# Patient Record
Sex: FEMALE | Race: WHITE | NOT HISPANIC OR LATINO | URBAN - METROPOLITAN AREA
[De-identification: names, ages, dates, MRNs, and addresses within clinical notes are randomized per-mention and may not be internally consistent; named-entity substitution may affect disease eponyms.]

---

## 2020-03-18 ENCOUNTER — INPATIENT (INPATIENT)
Facility: HOSPITAL | Age: 80
LOS: 8 days | Discharge: NURSING, REHAB OR LONG-TERM CARE | DRG: 280 | End: 2020-03-27
Attending: STUDENT IN AN ORGANIZED HEALTH CARE EDUCATION/TRAINING PROGRAM | Admitting: STUDENT IN AN ORGANIZED HEALTH CARE EDUCATION/TRAINING PROGRAM
Payer: MEDICARE

## 2020-03-18 VITALS
OXYGEN SATURATION: 97 % | WEIGHT: 78.26 LBS | DIASTOLIC BLOOD PRESSURE: 96 MMHG | SYSTOLIC BLOOD PRESSURE: 162 MMHG | HEART RATE: 114 BPM | TEMPERATURE: 96 F | RESPIRATION RATE: 20 BRPM

## 2020-03-18 DIAGNOSIS — R65.10 SYSTEMIC INFLAMMATORY RESPONSE SYNDROME (SIRS) OF NON-INFECTIOUS ORIGIN WITHOUT ACUTE ORGAN DYSFUNCTION: ICD-10-CM

## 2020-03-18 DIAGNOSIS — Z29.9 ENCOUNTER FOR PROPHYLACTIC MEASURES, UNSPECIFIED: ICD-10-CM

## 2020-03-18 DIAGNOSIS — I21.3 ST ELEVATION (STEMI) MYOCARDIAL INFARCTION OF UNSPECIFIED SITE: ICD-10-CM

## 2020-03-18 DIAGNOSIS — E87.0 HYPEROSMOLALITY AND HYPERNATREMIA: ICD-10-CM

## 2020-03-18 DIAGNOSIS — Z91.89 OTHER SPECIFIED PERSONAL RISK FACTORS, NOT ELSEWHERE CLASSIFIED: ICD-10-CM

## 2020-03-18 DIAGNOSIS — E87.2 ACIDOSIS: ICD-10-CM

## 2020-03-18 DIAGNOSIS — G93.40 ENCEPHALOPATHY, UNSPECIFIED: ICD-10-CM

## 2020-03-18 LAB
ALBUMIN SERPL ELPH-MCNC: 3.3 G/DL — SIGNIFICANT CHANGE UP (ref 3.3–5)
ALP SERPL-CCNC: 48 U/L — SIGNIFICANT CHANGE UP (ref 40–120)
ALT FLD-CCNC: 22 U/L — SIGNIFICANT CHANGE UP (ref 10–45)
ANION GAP SERPL CALC-SCNC: 19 MMOL/L — HIGH (ref 5–17)
ANION GAP SERPL CALC-SCNC: 24 MMOL/L — HIGH (ref 5–17)
APTT BLD: 29.9 SEC — SIGNIFICANT CHANGE UP (ref 27.5–36.3)
APTT BLD: 49.5 SEC — HIGH (ref 27.5–36.3)
AST SERPL-CCNC: 65 U/L — HIGH (ref 10–40)
B-OH-BUTYR SERPL-SCNC: 5 MMOL/L — HIGH
B-OH-BUTYR SERPL-SCNC: 5.1 MMOL/L — HIGH
BASE EXCESS BLDA CALC-SCNC: -4 MMOL/L — LOW (ref -2–3)
BASOPHILS # BLD AUTO: 0.02 K/UL — SIGNIFICANT CHANGE UP (ref 0–0.2)
BASOPHILS NFR BLD AUTO: 0.1 % — SIGNIFICANT CHANGE UP (ref 0–2)
BILIRUB SERPL-MCNC: 0.6 MG/DL — SIGNIFICANT CHANGE UP (ref 0.2–1.2)
BLD GP AB SCN SERPL QL: NEGATIVE — SIGNIFICANT CHANGE UP
BLD GP AB SCN SERPL QL: NEGATIVE — SIGNIFICANT CHANGE UP
BUN SERPL-MCNC: 59 MG/DL — HIGH (ref 7–23)
BUN SERPL-MCNC: 62 MG/DL — HIGH (ref 7–23)
CALCIUM SERPL-MCNC: 10.1 MG/DL — SIGNIFICANT CHANGE UP (ref 8.4–10.5)
CALCIUM SERPL-MCNC: 10.7 MG/DL — HIGH (ref 8.4–10.5)
CHLORIDE SERPL-SCNC: 107 MMOL/L — SIGNIFICANT CHANGE UP (ref 96–108)
CHLORIDE SERPL-SCNC: 110 MMOL/L — HIGH (ref 96–108)
CHOLEST SERPL-MCNC: 129 MG/DL — SIGNIFICANT CHANGE UP (ref 10–199)
CK MB CFR SERPL CALC: 47 NG/ML — HIGH (ref 0–6.7)
CK SERPL-CCNC: 193 U/L — HIGH (ref 25–170)
CO2 SERPL-SCNC: 18 MMOL/L — LOW (ref 22–31)
CO2 SERPL-SCNC: 19 MMOL/L — LOW (ref 22–31)
CREAT SERPL-MCNC: 0.79 MG/DL — SIGNIFICANT CHANGE UP (ref 0.5–1.3)
CREAT SERPL-MCNC: 0.82 MG/DL — SIGNIFICANT CHANGE UP (ref 0.5–1.3)
EOSINOPHIL # BLD AUTO: 0 K/UL — SIGNIFICANT CHANGE UP (ref 0–0.5)
EOSINOPHIL NFR BLD AUTO: 0 % — SIGNIFICANT CHANGE UP (ref 0–6)
ERYTHROCYTE [SEDIMENTATION RATE] IN BLOOD: 1 MM/HR — SIGNIFICANT CHANGE UP
FERRITIN SERPL-MCNC: 518 NG/ML — HIGH (ref 15–150)
GLUCOSE BLDC GLUCOMTR-MCNC: 127 MG/DL — HIGH (ref 70–99)
GLUCOSE SERPL-MCNC: 160 MG/DL — HIGH (ref 70–99)
GLUCOSE SERPL-MCNC: 193 MG/DL — HIGH (ref 70–99)
HBA1C BLD-MCNC: 5.5 % — SIGNIFICANT CHANGE UP (ref 4–5.6)
HCO3 BLDA-SCNC: 20 MMOL/L — LOW (ref 21–28)
HCT VFR BLD CALC: 46.2 % — HIGH (ref 34.5–45)
HDLC SERPL-MCNC: 59 MG/DL — SIGNIFICANT CHANGE UP
HGB BLD-MCNC: 15.9 G/DL — HIGH (ref 11.5–15.5)
IMM GRANULOCYTES NFR BLD AUTO: 0.7 % — SIGNIFICANT CHANGE UP (ref 0–1.5)
INR BLD: 1.47 — HIGH (ref 0.88–1.16)
INR BLD: 1.6 — HIGH (ref 0.88–1.16)
LACTATE SERPL-SCNC: 2.7 MMOL/L — HIGH (ref 0.5–2)
LDH SERPL L TO P-CCNC: 383 U/L — HIGH (ref 50–242)
LIPID PNL WITH DIRECT LDL SERPL: 46 MG/DL — SIGNIFICANT CHANGE UP
LYMPHOCYTES # BLD AUTO: 0.78 K/UL — LOW (ref 1–3.3)
LYMPHOCYTES # BLD AUTO: 5.3 % — LOW (ref 13–44)
MAGNESIUM SERPL-MCNC: 2.2 MG/DL — SIGNIFICANT CHANGE UP (ref 1.6–2.6)
MCHC RBC-ENTMCNC: 29.9 PG — SIGNIFICANT CHANGE UP (ref 27–34)
MCHC RBC-ENTMCNC: 34.4 GM/DL — SIGNIFICANT CHANGE UP (ref 32–36)
MCV RBC AUTO: 86.8 FL — SIGNIFICANT CHANGE UP (ref 80–100)
MONOCYTES # BLD AUTO: 1.15 K/UL — HIGH (ref 0–0.9)
MONOCYTES NFR BLD AUTO: 7.8 % — SIGNIFICANT CHANGE UP (ref 2–14)
NEUTROPHILS # BLD AUTO: 12.71 K/UL — HIGH (ref 1.8–7.4)
NEUTROPHILS NFR BLD AUTO: 86.1 % — HIGH (ref 43–77)
NRBC # BLD: 0 /100 WBCS — SIGNIFICANT CHANGE UP (ref 0–0)
OSMOLALITY SERPL: 336 MOSMOL/KG — HIGH (ref 280–301)
PCO2 BLDA: 34 MMHG — SIGNIFICANT CHANGE UP (ref 32–45)
PH BLDA: 7.39 — SIGNIFICANT CHANGE UP (ref 7.35–7.45)
PHOSPHATE SERPL-MCNC: 3.1 MG/DL — SIGNIFICANT CHANGE UP (ref 2.5–4.5)
PLATELET # BLD AUTO: 201 K/UL — SIGNIFICANT CHANGE UP (ref 150–400)
PO2 BLDA: 93 MMHG — SIGNIFICANT CHANGE UP (ref 83–108)
POTASSIUM SERPL-MCNC: 3.1 MMOL/L — LOW (ref 3.5–5.3)
POTASSIUM SERPL-MCNC: 3.6 MMOL/L — SIGNIFICANT CHANGE UP (ref 3.5–5.3)
POTASSIUM SERPL-SCNC: 3.1 MMOL/L — LOW (ref 3.5–5.3)
POTASSIUM SERPL-SCNC: 3.6 MMOL/L — SIGNIFICANT CHANGE UP (ref 3.5–5.3)
PROT SERPL-MCNC: 6.3 G/DL — SIGNIFICANT CHANGE UP (ref 6–8.3)
PROTHROM AB SERPL-ACNC: 16.9 SEC — HIGH (ref 10–12.9)
PROTHROM AB SERPL-ACNC: 18.5 SEC — HIGH (ref 10–12.9)
RAPID RVP RESULT: SIGNIFICANT CHANGE UP
RBC # BLD: 5.32 M/UL — HIGH (ref 3.8–5.2)
RBC # FLD: 14.9 % — HIGH (ref 10.3–14.5)
RH IG SCN BLD-IMP: NEGATIVE — SIGNIFICANT CHANGE UP
RH IG SCN BLD-IMP: NEGATIVE — SIGNIFICANT CHANGE UP
SAO2 % BLDA: 97 % — SIGNIFICANT CHANGE UP (ref 95–100)
SODIUM SERPL-SCNC: 148 MMOL/L — HIGH (ref 135–145)
SODIUM SERPL-SCNC: 149 MMOL/L — HIGH (ref 135–145)
T4 AB SER-ACNC: 8.46 UG/DL — SIGNIFICANT CHANGE UP (ref 3.17–11.72)
TOTAL CHOLESTEROL/HDL RATIO MEASUREMENT: 2.2 RATIO — LOW (ref 3.3–7.1)
TRIGL SERPL-MCNC: 122 MG/DL — SIGNIFICANT CHANGE UP (ref 10–149)
TROPONIN T SERPL-MCNC: 1.1 NG/ML — CRITICAL HIGH (ref 0–0.01)
TSH SERPL-MCNC: 0.13 UIU/ML — LOW (ref 0.35–4.94)
WBC # BLD: 14.76 K/UL — HIGH (ref 3.8–10.5)
WBC # FLD AUTO: 14.76 K/UL — HIGH (ref 3.8–10.5)

## 2020-03-18 PROCEDURE — 93460 R&L HRT ART/VENTRICLE ANGIO: CPT | Mod: 26

## 2020-03-18 PROCEDURE — 71250 CT THORAX DX C-: CPT | Mod: 26

## 2020-03-18 PROCEDURE — 71045 X-RAY EXAM CHEST 1 VIEW: CPT | Mod: 26

## 2020-03-18 PROCEDURE — 70450 CT HEAD/BRAIN W/O DYE: CPT | Mod: 26

## 2020-03-18 RX ORDER — ASPIRIN/CALCIUM CARB/MAGNESIUM 324 MG
300 TABLET ORAL ONCE
Refills: 0 | Status: COMPLETED | OUTPATIENT
Start: 2020-03-18 | End: 2020-03-18

## 2020-03-18 RX ORDER — POTASSIUM CHLORIDE 20 MEQ
20 PACKET (EA) ORAL
Refills: 0 | Status: COMPLETED | OUTPATIENT
Start: 2020-03-18 | End: 2020-03-18

## 2020-03-18 RX ORDER — SODIUM CHLORIDE 9 MG/ML
250 INJECTION INTRAMUSCULAR; INTRAVENOUS; SUBCUTANEOUS ONCE
Refills: 0 | Status: COMPLETED | OUTPATIENT
Start: 2020-03-18 | End: 2020-03-18

## 2020-03-18 RX ORDER — POTASSIUM CHLORIDE 20 MEQ
10 PACKET (EA) ORAL
Refills: 0 | Status: DISCONTINUED | OUTPATIENT
Start: 2020-03-18 | End: 2020-03-18

## 2020-03-18 RX ADMIN — Medication 100 MILLIEQUIVALENT(S): at 23:52

## 2020-03-18 RX ADMIN — Medication 300 MILLIGRAM(S): at 22:09

## 2020-03-18 RX ADMIN — Medication 100 MILLIEQUIVALENT(S): at 21:09

## 2020-03-18 RX ADMIN — SODIUM CHLORIDE 41.67 MILLILITER(S): 9 INJECTION INTRAMUSCULAR; INTRAVENOUS; SUBCUTANEOUS at 22:21

## 2020-03-18 RX ADMIN — SODIUM CHLORIDE 500 MILLILITER(S): 9 INJECTION INTRAMUSCULAR; INTRAVENOUS; SUBCUTANEOUS at 20:54

## 2020-03-18 RX ADMIN — Medication 100 MILLIEQUIVALENT(S): at 22:40

## 2020-03-18 NOTE — H&P ADULT - ASSESSMENT
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

## 2020-03-18 NOTE — H&P ADULT - NSHPPHYSICALEXAM_GEN_ALL_CORE
T(F): 97.3 (03-18-20 @ 19:00)  HR: 88 (03-18-20 @ 19:00)  BP: 91/57 (03-18-20 @ 19:00)  RR: 15 (03-18-20 @ 19:00)  SpO2: 98% (03-18-20 @ 19:00)    GENERAL: breathing on non-rebreather comfortably, elderly, cachectic, follows commands, only repeats her last name  HEAD: NC/AT  EYES: EOMI, no scleral icterus  HEENT: dry mucous membranes  NECK: Supple   LUNG: CTAB b/l, no wheezing or crackles  HEART: tachycardic, +s1 +s2  ABDOMEN: normal BS, no pain w/ deep palpation in all quadrants  EXTREMITIES: No leg edema b/l, right hand cyanotic w/ radial band  VASCULAR: 2+ radial pulse, multiple ulcers on toes/feet/back  CNS: Pt not responding to questions

## 2020-03-18 NOTE — H&P ADULT - PROBLEM SELECTOR PLAN 6
F: None (EF=15-20%)  E: Replete K and mag  N: NPO  A: lovenox F: None (EF=15-20%)  E: Replete K and mag  N: NPO  A: lovenox 40mg

## 2020-03-18 NOTE — H&P ADULT - HISTORY OF PRESENT ILLNESS
HPI: 78 yo F unknown PMH brought in by EMS w/ STEMI. Pt taken straight to cath lab on arrival. Pt was not able to provide hx, and no other hx provided. Pt was put on non-rebreather mask by EMS and taken streight to cath lab. There was no contacts listed or documented home address. Home address was written as hospital address by EMS. No home or family numbers listed.  Vitals: 96.4 F, , 162/96--->90/54, RR 15-20, spo2=97-98 on non-rebreather (15L/min)  Labs drawn during Cath: WBC 14.7, lymphocytes 0.78 (low), Hgb 15.9, INR=1.47, Na 149, K=3.1, HCO3-18, AG=24, BUN=62, Ca 10.7, AST=65, lactate dehydrogenase 383, troponin 1.10, creatine kinase 193, ABG PH=7.39 PO2=93 PCO2=34   Imaging: CXR neg for infiltrate HPI: 78 yo F unknown PMH brought in by EMS w/ STEMI. Pt taken straight to cath lab on arrival. Pt was not able to provide hx, and no other hx provided. Pt was put on non-rebreather mask by EMS and taken streight to cath lab. There was no contacts listed or documented home address. Home address was written as hospital address by EMS. No home or family numbers listed.  Vitals: 96.4 F, , 162/96--->90/54, RR 15-20, spo2=97-98 on non-rebreather (15L/min)  Labs drawn during Cath: WBC 14.7, lymphocytes 0.78 (low), Hgb 15.9, INR=1.47, Na 149, K=3.1, HCO3-18, AG=24, BUN=62, Ca 10.7, AST=65, lactate dehydrogenase 383, troponin 1.10, creatine kinase 193, ABG PH=7.39 PO2=93 PCO2=34   Imaging: CXR neg for infiltrate, however hyperinflated HPI: 78 yo F unknown PMH brought in by EMS w/ STEMI. Pt taken straight to cath lab on arrival. Pt was not able to provide hx, and no other hx provided. Pt was put on non-rebreather mask by EMS and taken straight to cath lab. There were no contacts listed or documented home address. Home address was written as hospital address by EMS. No home or family numbers listed.  Vitals: 96.4 F, , 162/96--->90/54, RR 15-20, spo2=97-98 on non-rebreather (15L/min)  Labs drawn during Cath: WBC 14.7, lymphocytes 0.78 (low), Hgb 15.9, INR=1.47, Na 149, K=3.1, HCO3-18, AG=24, BUN=62, Ca 10.7, AST=65, lactate dehydrogenase 383, troponin 1.10, creatine kinase 193, ABG PH=7.39 PO2=93 PCO2=34   Imaging: CXR neg for infiltrate, however hyperinflated

## 2020-03-18 NOTE — H&P ADULT - PROBLEM SELECTOR PLAN 3
Pt presenting w/ no provided hx by EMS, and not answering questions on exam. Does follow commands. May be 2/2 SIRS/possible sepsis vs STEMI/Takutsobo Cardiomyopathy vs underlying dementia vs AGMA 2/2 uremia/elevated lactic acid  -need collateral on living situation/family  -f/u U/A, RVP, bcx, lactate, u/a, utox, rvp Pt presenting w/ no provided hx by EMS, and not answering questions on exam. Does follow commands. May be 2/2 SIRS/possible sepsis vs STEMI/Takotsubo Cardiomyopathy vs underlying dementia vs AGMA 2/2 uremia/elevated lactic acid  -need collateral on living situation/family  -f/u U/A, RVP, bcx, lactate, u/a, utox, rvp

## 2020-03-18 NOTE — H&P ADULT - PROBLEM SELECTOR PLAN 5
Pd=447, may be 2/2 dehydration  -will consider fluids, as dry on exam  -trend Na Rc=215, may be 2/2 dehydration  -will give 250cc fluids, as dry on exam  -trend Na

## 2020-03-18 NOTE — H&P ADULT - PROBLEM SELECTOR PLAN 1
STEMI 2/2 Takusubo: Pt brought in by EMS, ST elevation on EKG. STEMI protocol initiated. s/p cardiac cath showing LV EF=15-20%, severe apical ballooning, akinesis suggestive of Takutsobo Cardiomyopathy. No stents placed. LVEDP=17mmhg+ normal EDP pre and post angio, anterior and apical akinesis. LAD=70% diffuse stenosis, Lcx=30-40% stenosis, RCA=large One vessel coronary artery disease, Frailty score=7, RCA=mild luminal irregularities. [place] catheter placed during cath. Pt unable to provide any history. Takutsobo most likely 2/2 sepsis vs shock vs stress/emotional vs drug induced vs 2/2 underlying HF  -c/w radial artery check w/ radial band. Pt hand is cyanotic, however was noted to be cyanotic per EMS before procedure  -f/u repeat coags  -f/u ekg  -will aspirin load, and start high dose statin  -f/u repeat echo STEMI 2/2 Takusubo: Pt brought in by EMS, ST elevation on EKG in lateral leads. STEMI protocol initiated. s/p cardiac cath showing LV EF=15-20%, severe apical ballooning, akinesis suggestive of Takutsobo Cardiomyopathy. No stents placed. LVEDP=17mmhg+ normal EDP pre and post angio, anterior and apical akinesis. LAD=70% diffuse stenosis, Lcx=30-40% stenosis, RCA=large One vessel coronary artery disease, Frailty score=7, RCA=mild luminal irregularities. [place] catheter placed during cath. Pt unable to provide any history. Takutsobo most likely 2/2 sepsis vs shock vs stress/emotional vs drug induced vs 2/2 underlying HF  -c/w radial artery check w/ radial band. Pt hand is cyanotic, however was noted to be cyanotic per EMS before procedure  -f/u repeat coags  -f/u ekg  -will aspirin load, and start high dose statin  -f/u repeat echo

## 2020-03-18 NOTE — H&P ADULT - PROBLEM SELECTOR PLAN 4
Pt w/ HCO3=18, AG=24, most likely 2/2 poor perfusion 2/2 STEMI, w/ elevated lactic acid and uremia  -trend lactate to clearance  -trend BUN  -sent lactate  -pt dry on exam, will consider fluids  -f/u U/A Pt w/ HCO3=18, AG=24, most likely 2/2 poor perfusion 2/2 STEMI, w/ elevated lactic acid and uremia  -trend lactate to clearance  -trend BUN  -sent lactate  -pt dry on exam, will consider fluids  -f/u U/A  -serum osmolal gap of 7

## 2020-03-18 NOTE — H&P ADULT - NSHPLABSRESULTS_GEN_ALL_CORE
.  LABS:                         15.9   14.76 )-----------( 201      ( 18 Mar 2020 17:18 )             46.2     03-18    149<H>  |  107  |  62<H>  ----------------------------<  193<H>  3.1<L>   |  18<L>  |  0.82    Ca    10.7<H>      18 Mar 2020 17:18  Mg     2.2     03-18    TPro  6.3  /  Alb  3.3  /  TBili  0.6  /  DBili  x   /  AST  65<H>  /  ALT  22  /  AlkPhos  48  03-18    PT/INR - ( 18 Mar 2020 17:18 )   PT: 16.9 sec;   INR: 1.47          PTT - ( 18 Mar 2020 17:18 )  PTT:29.9 sec    CARDIAC MARKERS ( 18 Mar 2020 17:18 )  x     / 1.10 ng/mL / 193 U/L / x     / 47.0 ng/mL        Lactate, Blood: 2.7 mmol/L (03-18 @ 19:03)      RADIOLOGY, EKG & ADDITIONAL TESTS: Reviewed. LABS:                         15.9   14.76 )-----------( 201      ( 18 Mar 2020 17:18 )             46.2     03-18    149<H>  |  107  |  62<H>  ----------------------------<  193<H>  3.1<L>   |  18<L>  |  0.82    Ca    10.7<H>      18 Mar 2020 17:18  Mg     2.2     03-18    TPro  6.3  /  Alb  3.3  /  TBili  0.6  /  DBili  x   /  AST  65<H>  /  ALT  22  /  AlkPhos  48  03-18    PT/INR - ( 18 Mar 2020 17:18 )   PT: 16.9 sec;   INR: 1.47          PTT - ( 18 Mar 2020 17:18 )  PTT:29.9 sec    CARDIAC MARKERS ( 18 Mar 2020 17:18 )  x     / 1.10 ng/mL / 193 U/L / x     / 47.0 ng/mL        Lactate, Blood: 2.7 mmol/L (03-18 @ 19:03)      RADIOLOGY, EKG & ADDITIONAL TESTS: Reviewed.

## 2020-03-18 NOTE — PATIENT PROFILE ADULT - FALL HARM RISK
coagulation(Bleeding disorder R/T clinical cond/anti-coags)/other/UTD; pt barely speaking/responsive

## 2020-03-18 NOTE — H&P ADULT - PROBLEM SELECTOR PLAN 2
tachycardic , leukocytosis wbc 14, lymphopenia, hypothermic 96.7F, may be 2/2 infection vs reactive to cath  -sent RVP  -f/u U/A, RVP, bcx, lactate, u/a, utox, rvp  -hold off on fluids as pt EF 15-20%

## 2020-03-19 DIAGNOSIS — R62.7 ADULT FAILURE TO THRIVE: ICD-10-CM

## 2020-03-19 DIAGNOSIS — I42.9 CARDIOMYOPATHY, UNSPECIFIED: ICD-10-CM

## 2020-03-19 DIAGNOSIS — J96.91 RESPIRATORY FAILURE, UNSPECIFIED WITH HYPOXIA: ICD-10-CM

## 2020-03-19 DIAGNOSIS — E07.9 DISORDER OF THYROID, UNSPECIFIED: ICD-10-CM

## 2020-03-19 LAB
ALBUMIN SERPL ELPH-MCNC: 2.4 G/DL — LOW (ref 3.3–5)
ALBUMIN SERPL ELPH-MCNC: 3.1 G/DL — LOW (ref 3.3–5)
ALP SERPL-CCNC: 35 U/L — LOW (ref 40–120)
ALP SERPL-CCNC: 44 U/L — SIGNIFICANT CHANGE UP (ref 40–120)
ALT FLD-CCNC: 16 U/L — SIGNIFICANT CHANGE UP (ref 10–45)
ALT FLD-CCNC: 20 U/L — SIGNIFICANT CHANGE UP (ref 10–45)
ANION GAP SERPL CALC-SCNC: 10 MMOL/L — SIGNIFICANT CHANGE UP (ref 5–17)
ANION GAP SERPL CALC-SCNC: 13 MMOL/L — SIGNIFICANT CHANGE UP (ref 5–17)
ANION GAP SERPL CALC-SCNC: 13 MMOL/L — SIGNIFICANT CHANGE UP (ref 5–17)
ANION GAP SERPL CALC-SCNC: 18 MMOL/L — HIGH (ref 5–17)
APPEARANCE UR: CLEAR — SIGNIFICANT CHANGE UP
APTT BLD: 27.6 SEC — SIGNIFICANT CHANGE UP (ref 27.5–36.3)
AST SERPL-CCNC: 41 U/L — HIGH (ref 10–40)
AST SERPL-CCNC: 55 U/L — HIGH (ref 10–40)
BACTERIA # UR AUTO: PRESENT /HPF
BASE EXCESS BLDA CALC-SCNC: -4.4 MMOL/L — LOW (ref -2–3)
BASOPHILS # BLD AUTO: 0.03 K/UL — SIGNIFICANT CHANGE UP (ref 0–0.2)
BASOPHILS NFR BLD AUTO: 0.2 % — SIGNIFICANT CHANGE UP (ref 0–2)
BILIRUB SERPL-MCNC: 0.4 MG/DL — SIGNIFICANT CHANGE UP (ref 0.2–1.2)
BILIRUB SERPL-MCNC: 0.5 MG/DL — SIGNIFICANT CHANGE UP (ref 0.2–1.2)
BILIRUB UR-MCNC: ABNORMAL
BUN SERPL-MCNC: 50 MG/DL — HIGH (ref 7–23)
BUN SERPL-MCNC: 52 MG/DL — HIGH (ref 7–23)
BUN SERPL-MCNC: 54 MG/DL — HIGH (ref 7–23)
BUN SERPL-MCNC: 59 MG/DL — HIGH (ref 7–23)
CALCIUM SERPL-MCNC: 10.1 MG/DL — SIGNIFICANT CHANGE UP (ref 8.4–10.5)
CALCIUM SERPL-MCNC: 8.7 MG/DL — SIGNIFICANT CHANGE UP (ref 8.4–10.5)
CALCIUM SERPL-MCNC: 9.4 MG/DL — SIGNIFICANT CHANGE UP (ref 8.4–10.5)
CALCIUM SERPL-MCNC: 9.8 MG/DL — SIGNIFICANT CHANGE UP (ref 8.4–10.5)
CHLORIDE SERPL-SCNC: 112 MMOL/L — HIGH (ref 96–108)
CHLORIDE SERPL-SCNC: 113 MMOL/L — HIGH (ref 96–108)
CHLORIDE SERPL-SCNC: 113 MMOL/L — HIGH (ref 96–108)
CHLORIDE SERPL-SCNC: 118 MMOL/L — HIGH (ref 96–108)
CK MB CFR SERPL CALC: 31.1 NG/ML — HIGH (ref 0–6.7)
CK SERPL-CCNC: 150 U/L — SIGNIFICANT CHANGE UP (ref 25–170)
CO2 SERPL-SCNC: 19 MMOL/L — LOW (ref 22–31)
CO2 SERPL-SCNC: 19 MMOL/L — LOW (ref 22–31)
CO2 SERPL-SCNC: 22 MMOL/L — SIGNIFICANT CHANGE UP (ref 22–31)
CO2 SERPL-SCNC: 23 MMOL/L — SIGNIFICANT CHANGE UP (ref 22–31)
COLOR SPEC: YELLOW — SIGNIFICANT CHANGE UP
CORTIS AM PEAK SERPL-MCNC: 93.9 UG/DL — SIGNIFICANT CHANGE UP (ref 3.9–37.5)
CREAT SERPL-MCNC: 0.63 MG/DL — SIGNIFICANT CHANGE UP (ref 0.5–1.3)
CREAT SERPL-MCNC: 0.63 MG/DL — SIGNIFICANT CHANGE UP (ref 0.5–1.3)
CREAT SERPL-MCNC: 0.68 MG/DL — SIGNIFICANT CHANGE UP (ref 0.5–1.3)
CREAT SERPL-MCNC: 0.81 MG/DL — SIGNIFICANT CHANGE UP (ref 0.5–1.3)
CRP SERPL-MCNC: 2.09 MG/DL — HIGH (ref 0–0.4)
DIFF PNL FLD: ABNORMAL
EOSINOPHIL # BLD AUTO: 0 K/UL — SIGNIFICANT CHANGE UP (ref 0–0.5)
EOSINOPHIL NFR BLD AUTO: 0 % — SIGNIFICANT CHANGE UP (ref 0–6)
EPI CELLS # UR: SIGNIFICANT CHANGE UP /HPF (ref 0–5)
ETHANOL SERPL-MCNC: <10 MG/DL — SIGNIFICANT CHANGE UP (ref 0–10)
FOLATE SERPL-MCNC: 18.6 NG/ML — SIGNIFICANT CHANGE UP
GAS PNL BLDA: SIGNIFICANT CHANGE UP
GLUCOSE BLDC GLUCOMTR-MCNC: 166 MG/DL — HIGH (ref 70–99)
GLUCOSE BLDC GLUCOMTR-MCNC: 170 MG/DL — HIGH (ref 70–99)
GLUCOSE BLDC GLUCOMTR-MCNC: 184 MG/DL — HIGH (ref 70–99)
GLUCOSE SERPL-MCNC: 139 MG/DL — HIGH (ref 70–99)
GLUCOSE SERPL-MCNC: 163 MG/DL — HIGH (ref 70–99)
GLUCOSE SERPL-MCNC: 226 MG/DL — HIGH (ref 70–99)
GLUCOSE SERPL-MCNC: 235 MG/DL — HIGH (ref 70–99)
GLUCOSE UR QL: NEGATIVE — SIGNIFICANT CHANGE UP
GRAN CASTS # UR COMP ASSIST: ABNORMAL /LPF
HCO3 BLDA-SCNC: 18 MMOL/L — LOW (ref 21–28)
HCT VFR BLD CALC: 43.9 % — SIGNIFICANT CHANGE UP (ref 34.5–45)
HGB BLD-MCNC: 14.9 G/DL — SIGNIFICANT CHANGE UP (ref 11.5–15.5)
HYALINE CASTS # UR AUTO: ABNORMAL /LPF (ref 0–2)
IMM GRANULOCYTES NFR BLD AUTO: 0.9 % — SIGNIFICANT CHANGE UP (ref 0–1.5)
INR BLD: 1.56 — HIGH (ref 0.88–1.16)
KETONES UR-MCNC: 15 MG/DL
LACTATE SERPL-SCNC: 1.7 MMOL/L — SIGNIFICANT CHANGE UP (ref 0.5–2)
LEUKOCYTE ESTERASE UR-ACNC: NEGATIVE — SIGNIFICANT CHANGE UP
LYMPHOCYTES # BLD AUTO: 0.75 K/UL — LOW (ref 1–3.3)
LYMPHOCYTES # BLD AUTO: 4.3 % — LOW (ref 13–44)
MAGNESIUM SERPL-MCNC: 1.8 MG/DL — SIGNIFICANT CHANGE UP (ref 1.6–2.6)
MAGNESIUM SERPL-MCNC: 2.1 MG/DL — SIGNIFICANT CHANGE UP (ref 1.6–2.6)
MAGNESIUM SERPL-MCNC: 2.5 MG/DL — SIGNIFICANT CHANGE UP (ref 1.6–2.6)
MAGNESIUM SERPL-MCNC: 2.7 MG/DL — HIGH (ref 1.6–2.6)
MCHC RBC-ENTMCNC: 30 PG — SIGNIFICANT CHANGE UP (ref 27–34)
MCHC RBC-ENTMCNC: 33.9 GM/DL — SIGNIFICANT CHANGE UP (ref 32–36)
MCV RBC AUTO: 88.5 FL — SIGNIFICANT CHANGE UP (ref 80–100)
MONOCYTES # BLD AUTO: 1.45 K/UL — HIGH (ref 0–0.9)
MONOCYTES NFR BLD AUTO: 8.3 % — SIGNIFICANT CHANGE UP (ref 2–14)
NEUTROPHILS # BLD AUTO: 15.08 K/UL — HIGH (ref 1.8–7.4)
NEUTROPHILS NFR BLD AUTO: 86.3 % — HIGH (ref 43–77)
NITRITE UR-MCNC: NEGATIVE — SIGNIFICANT CHANGE UP
NRBC # BLD: 0 /100 WBCS — SIGNIFICANT CHANGE UP (ref 0–0)
OSMOLALITY SERPL: 336 MOSMOL/KG — HIGH (ref 280–301)
OSMOLALITY SERPL: 341 MOSMOL/KG — HIGH (ref 280–301)
OSMOLALITY UR: 739 MOSMOL/KG — HIGH (ref 100–650)
OSMOLALITY UR: 745 MOSMOL/KG — HIGH (ref 100–650)
OSMOLALITY UR: 847 MOSMOL/KG — HIGH (ref 100–650)
PCO2 BLDA: 29 MMHG — LOW (ref 32–45)
PCP SPEC-MCNC: SIGNIFICANT CHANGE UP
PH BLDA: 7.42 — SIGNIFICANT CHANGE UP (ref 7.35–7.45)
PH UR: 6.5 — SIGNIFICANT CHANGE UP (ref 5–8)
PHOSPHATE SERPL-MCNC: 2.1 MG/DL — LOW (ref 2.5–4.5)
PHOSPHATE SERPL-MCNC: 2.4 MG/DL — LOW (ref 2.5–4.5)
PHOSPHATE SERPL-MCNC: 2.5 MG/DL — SIGNIFICANT CHANGE UP (ref 2.5–4.5)
PHOSPHATE SERPL-MCNC: 4.2 MG/DL — SIGNIFICANT CHANGE UP (ref 2.5–4.5)
PLATELET # BLD AUTO: 175 K/UL — SIGNIFICANT CHANGE UP (ref 150–400)
PO2 BLDA: 75 MMHG — LOW (ref 83–108)
POTASSIUM SERPL-MCNC: 4.5 MMOL/L — SIGNIFICANT CHANGE UP (ref 3.5–5.3)
POTASSIUM SERPL-MCNC: 4.6 MMOL/L — SIGNIFICANT CHANGE UP (ref 3.5–5.3)
POTASSIUM SERPL-MCNC: 5.3 MMOL/L — SIGNIFICANT CHANGE UP (ref 3.5–5.3)
POTASSIUM SERPL-MCNC: 5.6 MMOL/L — HIGH (ref 3.5–5.3)
POTASSIUM SERPL-SCNC: 4.5 MMOL/L — SIGNIFICANT CHANGE UP (ref 3.5–5.3)
POTASSIUM SERPL-SCNC: 4.6 MMOL/L — SIGNIFICANT CHANGE UP (ref 3.5–5.3)
POTASSIUM SERPL-SCNC: 5.3 MMOL/L — SIGNIFICANT CHANGE UP (ref 3.5–5.3)
POTASSIUM SERPL-SCNC: 5.6 MMOL/L — HIGH (ref 3.5–5.3)
PROT SERPL-MCNC: 5 G/DL — LOW (ref 6–8.3)
PROT SERPL-MCNC: 5.8 G/DL — LOW (ref 6–8.3)
PROT UR-MCNC: ABNORMAL MG/DL
PROTHROM AB SERPL-ACNC: 18 SEC — HIGH (ref 10–12.9)
RBC # BLD: 4.96 M/UL — SIGNIFICANT CHANGE UP (ref 3.8–5.2)
RBC # FLD: 15.3 % — HIGH (ref 10.3–14.5)
RBC CASTS # UR COMP ASSIST: < 5 /HPF — SIGNIFICANT CHANGE UP
SALICYLATES SERPL-MCNC: 1.4 MG/DL — LOW (ref 2.8–20)
SAO2 % BLDA: 95 % — SIGNIFICANT CHANGE UP (ref 95–100)
SODIUM SERPL-SCNC: 145 MMOL/L — SIGNIFICANT CHANGE UP (ref 135–145)
SODIUM SERPL-SCNC: 148 MMOL/L — HIGH (ref 135–145)
SODIUM SERPL-SCNC: 150 MMOL/L — HIGH (ref 135–145)
SODIUM SERPL-SCNC: 150 MMOL/L — HIGH (ref 135–145)
SODIUM UR-SCNC: 20 MMOL/L — SIGNIFICANT CHANGE UP
SP GR SPEC: <=1.005 — SIGNIFICANT CHANGE UP (ref 1–1.03)
T3 SERPL-MCNC: 56 NG/DL — LOW (ref 80–200)
T4 FREE SERPL-MCNC: 1.13 NG/DL — SIGNIFICANT CHANGE UP (ref 0.7–1.48)
TROPONIN T SERPL-MCNC: 0.94 NG/ML — CRITICAL HIGH (ref 0–0.01)
UROBILINOGEN FLD QL: 1 E.U./DL — SIGNIFICANT CHANGE UP
VIT B12 SERPL-MCNC: 1475 PG/ML — HIGH (ref 232–1245)
WBC # BLD: 17.46 K/UL — HIGH (ref 3.8–10.5)
WBC # FLD AUTO: 17.46 K/UL — HIGH (ref 3.8–10.5)
WBC UR QL: < 5 /HPF — SIGNIFICANT CHANGE UP

## 2020-03-19 PROCEDURE — 76536 US EXAM OF HEAD AND NECK: CPT | Mod: 26

## 2020-03-19 PROCEDURE — 71045 X-RAY EXAM CHEST 1 VIEW: CPT | Mod: 26,76

## 2020-03-19 PROCEDURE — 71045 X-RAY EXAM CHEST 1 VIEW: CPT | Mod: 26,77

## 2020-03-19 PROCEDURE — 93306 TTE W/DOPPLER COMPLETE: CPT | Mod: 26

## 2020-03-19 RX ORDER — ATORVASTATIN CALCIUM 80 MG/1
80 TABLET, FILM COATED ORAL EVERY 24 HOURS
Refills: 0 | Status: DISCONTINUED | OUTPATIENT
Start: 2020-03-19 | End: 2020-03-26

## 2020-03-19 RX ORDER — ASCORBIC ACID 60 MG
500 TABLET,CHEWABLE ORAL EVERY 24 HOURS
Refills: 0 | Status: DISCONTINUED | OUTPATIENT
Start: 2020-03-19 | End: 2020-03-26

## 2020-03-19 RX ORDER — DEXTROSE 50 % IN WATER 50 %
25 SYRINGE (ML) INTRAVENOUS ONCE
Refills: 0 | Status: DISCONTINUED | OUTPATIENT
Start: 2020-03-19 | End: 2020-03-22

## 2020-03-19 RX ORDER — MAGNESIUM SULFATE 500 MG/ML
2 VIAL (ML) INJECTION ONCE
Refills: 0 | Status: COMPLETED | OUTPATIENT
Start: 2020-03-19 | End: 2020-03-19

## 2020-03-19 RX ORDER — POTASSIUM PHOSPHATE, MONOBASIC POTASSIUM PHOSPHATE, DIBASIC 236; 224 MG/ML; MG/ML
15 INJECTION, SOLUTION INTRAVENOUS ONCE
Refills: 0 | Status: COMPLETED | OUTPATIENT
Start: 2020-03-19 | End: 2020-03-19

## 2020-03-19 RX ORDER — INSULIN HUMAN 100 [IU]/ML
INJECTION, SOLUTION SUBCUTANEOUS EVERY 6 HOURS
Refills: 0 | Status: DISCONTINUED | OUTPATIENT
Start: 2020-03-19 | End: 2020-03-19

## 2020-03-19 RX ORDER — SODIUM CHLORIDE 9 MG/ML
1000 INJECTION, SOLUTION INTRAVENOUS
Refills: 0 | Status: DISCONTINUED | OUTPATIENT
Start: 2020-03-19 | End: 2020-03-19

## 2020-03-19 RX ORDER — GLUCAGON INJECTION, SOLUTION 0.5 MG/.1ML
1 INJECTION, SOLUTION SUBCUTANEOUS ONCE
Refills: 0 | Status: DISCONTINUED | OUTPATIENT
Start: 2020-03-19 | End: 2020-03-22

## 2020-03-19 RX ORDER — ENOXAPARIN SODIUM 100 MG/ML
30 INJECTION SUBCUTANEOUS EVERY 24 HOURS
Refills: 0 | Status: DISCONTINUED | OUTPATIENT
Start: 2020-03-20 | End: 2020-03-27

## 2020-03-19 RX ORDER — SODIUM CHLORIDE 9 MG/ML
1000 INJECTION, SOLUTION INTRAVENOUS
Refills: 0 | Status: DISCONTINUED | OUTPATIENT
Start: 2020-03-19 | End: 2020-03-22

## 2020-03-19 RX ORDER — METOPROLOL TARTRATE 50 MG
12.5 TABLET ORAL EVERY 12 HOURS
Refills: 0 | Status: DISCONTINUED | OUTPATIENT
Start: 2020-03-19 | End: 2020-03-27

## 2020-03-19 RX ORDER — ENOXAPARIN SODIUM 100 MG/ML
40 INJECTION SUBCUTANEOUS EVERY 24 HOURS
Refills: 0 | Status: DISCONTINUED | OUTPATIENT
Start: 2020-03-19 | End: 2020-03-19

## 2020-03-19 RX ORDER — METOPROLOL TARTRATE 50 MG
2.5 TABLET ORAL EVERY 6 HOURS
Refills: 0 | Status: DISCONTINUED | OUTPATIENT
Start: 2020-03-19 | End: 2020-03-19

## 2020-03-19 RX ORDER — INSULIN LISPRO 100/ML
VIAL (ML) SUBCUTANEOUS EVERY 6 HOURS
Refills: 0 | Status: DISCONTINUED | OUTPATIENT
Start: 2020-03-19 | End: 2020-03-22

## 2020-03-19 RX ORDER — DEXTROSE 50 % IN WATER 50 %
12.5 SYRINGE (ML) INTRAVENOUS ONCE
Refills: 0 | Status: DISCONTINUED | OUTPATIENT
Start: 2020-03-19 | End: 2020-03-22

## 2020-03-19 RX ORDER — ASPIRIN/CALCIUM CARB/MAGNESIUM 324 MG
81 TABLET ORAL DAILY
Refills: 0 | Status: DISCONTINUED | OUTPATIENT
Start: 2020-03-19 | End: 2020-03-25

## 2020-03-19 RX ORDER — THIAMINE MONONITRATE (VIT B1) 100 MG
500 TABLET ORAL ONCE
Refills: 0 | Status: COMPLETED | OUTPATIENT
Start: 2020-03-19 | End: 2020-03-19

## 2020-03-19 RX ORDER — ASPIRIN/CALCIUM CARB/MAGNESIUM 324 MG
81 TABLET ORAL EVERY 24 HOURS
Refills: 0 | Status: DISCONTINUED | OUTPATIENT
Start: 2020-03-19 | End: 2020-03-19

## 2020-03-19 RX ORDER — DEXTROSE 50 % IN WATER 50 %
15 SYRINGE (ML) INTRAVENOUS ONCE
Refills: 0 | Status: DISCONTINUED | OUTPATIENT
Start: 2020-03-19 | End: 2020-03-22

## 2020-03-19 RX ORDER — THIAMINE MONONITRATE (VIT B1) 100 MG
500 TABLET ORAL EVERY 24 HOURS
Refills: 0 | Status: COMPLETED | OUTPATIENT
Start: 2020-03-20 | End: 2020-03-21

## 2020-03-19 RX ORDER — METOPROLOL TARTRATE 50 MG
5 TABLET ORAL EVERY 6 HOURS
Refills: 0 | Status: DISCONTINUED | OUTPATIENT
Start: 2020-03-19 | End: 2020-03-19

## 2020-03-19 RX ORDER — VANCOMYCIN HCL 1 G
500 VIAL (EA) INTRAVENOUS ONCE
Refills: 0 | Status: COMPLETED | OUTPATIENT
Start: 2020-03-19 | End: 2020-03-19

## 2020-03-19 RX ORDER — CEFTRIAXONE 500 MG/1
2000 INJECTION, POWDER, FOR SOLUTION INTRAMUSCULAR; INTRAVENOUS EVERY 24 HOURS
Refills: 0 | Status: DISCONTINUED | OUTPATIENT
Start: 2020-03-19 | End: 2020-03-19

## 2020-03-19 RX ORDER — CHLORHEXIDINE GLUCONATE 213 G/1000ML
1 SOLUTION TOPICAL
Refills: 0 | Status: DISCONTINUED | OUTPATIENT
Start: 2020-03-19 | End: 2020-03-22

## 2020-03-19 RX ADMIN — Medication 50 GRAM(S): at 09:16

## 2020-03-19 RX ADMIN — Medication 100 MILLIGRAM(S): at 06:50

## 2020-03-19 RX ADMIN — SODIUM CHLORIDE 60 MILLILITER(S): 9 INJECTION, SOLUTION INTRAVENOUS at 09:16

## 2020-03-19 RX ADMIN — Medication 1: at 18:39

## 2020-03-19 RX ADMIN — CHLORHEXIDINE GLUCONATE 1 APPLICATION(S): 213 SOLUTION TOPICAL at 18:42

## 2020-03-19 RX ADMIN — CEFTRIAXONE 100 MILLIGRAM(S): 500 INJECTION, POWDER, FOR SOLUTION INTRAMUSCULAR; INTRAVENOUS at 06:03

## 2020-03-19 RX ADMIN — POTASSIUM PHOSPHATE, MONOBASIC POTASSIUM PHOSPHATE, DIBASIC 63.75 MILLIMOLE(S): 236; 224 INJECTION, SOLUTION INTRAVENOUS at 10:28

## 2020-03-19 RX ADMIN — Medication 105 MILLIGRAM(S): at 05:04

## 2020-03-19 RX ADMIN — ENOXAPARIN SODIUM 40 MILLIGRAM(S): 100 INJECTION SUBCUTANEOUS at 06:06

## 2020-03-19 RX ADMIN — Medication 500 MILLIGRAM(S): at 18:40

## 2020-03-19 RX ADMIN — SODIUM CHLORIDE 40 MILLILITER(S): 9 INJECTION, SOLUTION INTRAVENOUS at 05:30

## 2020-03-19 RX ADMIN — ATORVASTATIN CALCIUM 80 MILLIGRAM(S): 80 TABLET, FILM COATED ORAL at 18:41

## 2020-03-19 RX ADMIN — Medication 1 TABLET(S): at 18:40

## 2020-03-19 RX ADMIN — Medication 2.5 MILLIGRAM(S): at 06:25

## 2020-03-19 RX ADMIN — Medication 5 MILLIGRAM(S): at 12:44

## 2020-03-19 RX ADMIN — Medication 12.5 MILLIGRAM(S): at 18:41

## 2020-03-19 RX ADMIN — Medication 81 MILLIGRAM(S): at 18:41

## 2020-03-19 NOTE — DIETITIAN INITIAL EVALUATION ADULT. - PROBLEM SELECTOR PLAN 3
Pt presenting w/ no provided hx by EMS, and not answering questions on exam. Does follow commands. May be 2/2 SIRS/possible sepsis vs STEMI/Takotsubo Cardiomyopathy vs underlying dementia vs AGMA 2/2 uremia/elevated lactic acid  -need collateral on living situation/family  -f/u U/A, RVP, bcx, lactate, u/a, utox, rvp

## 2020-03-19 NOTE — PROGRESS NOTE ADULT - PROBLEM SELECTOR PLAN 2
CAD (coronary artery disease)  w/ 4 stents  DM (diabetes mellitus)    HLD (hyperlipidemia)    HTN (hypertension) Pt presenting w/ no provided hx by EMS, and not answering questions on exam. Does follow commands. Most likely 2/2 AG metabolic acidosis 2/2 starvation ketosis/lactic acidosis/uremia vs underlying dementia (CTH=microvascular ischemic changes + parenchymal volume loss) vs infection (less likely as afebrile, RVP neg, CXR clear/no cough, u/a clear of infection). Pt more awake and alert today, still not speaking but follows commands vs chronic FTT vs toxins (utox neg)  -c/w D5 for hypernatremia/nutrition  -consulted dietician and SW  -need collateral on living situation/family  -f/u bcx, lactate Pt presenting w/ no provided hx by EMS, and not answering questions on exam. Does follow commands. Most likely 2/2 AG metabolic acidosis 2/2 starvation ketosis/lactic acidosis/uremia vs underlying dementia (CTH=microvascular ischemic changes + parenchymal volume loss) vs infection (less likely as afebrile, RVP neg, CXR clear/no cough, u/a clear of infection, improving mental function). Pt more awake and alert today, still not speaking but follows commands vs chronic FTT vs toxins (utox neg)  -discontinued antibiotics as pt does not seem to have an infection, and improving mental status  -c/w D5 for hypernatremia/nutrition  -consulted dietician and SW  -need collateral on living situation/family  -f/u bcx, lactate

## 2020-03-19 NOTE — DIETITIAN INITIAL EVALUATION ADULT. - ENTERAL
Should TF be indicated/consistent with GOC, consider jevity 1.2 x24hrs goalrate 40ml/hr to provide ~960ml, 1152kcal, 53gm prot, 775ml water. Start at low rate, 10cc, increase slowly, 10cc q12hrs. Monitor need for formula change, Noted K slightly elevated when admitted.

## 2020-03-19 NOTE — DIETITIAN INITIAL EVALUATION ADULT. - ENERGY NEEDS
4'11'' IBW 98 pounds +/-10%   (3/18) 78 pounds BMI 15.8 %IBW=79.5%  (3/19) 80.4 pounds BMI 16.16 %IBW=82%   Wt Differences noted, assume d/t edema/fluids, No edema when pt first admitted, +edema noted at this time  IBW used for EER d/t Dry wt being less then 80% IBW; Adjusted For age, BMI, Skin, Fluids per team (CHF pt with elevated sodium)

## 2020-03-19 NOTE — DIETITIAN INITIAL EVALUATION ADULT. - PERTINENT LABORATORY DATA
Na 150, Mg 1.8, BUN 54, Cr WDL, Glucose 226, Ca WDL, POCT 166, phosphorus 2.1, A1c 5.5%, Low HDL CHOL Ratio, A1c 5.5% Na 150, Mg 1.8, BUN 54, Cr WDL, Glucose 226, Ca WDL, POCT 166, phosphorus 2.1 - Slightly elevated prior, A1c 5.5%, Low HDL CHOL Ratio, A1c 5.5%, Osmolality 336

## 2020-03-19 NOTE — DIETITIAN INITIAL EVALUATION ADULT. - LAB (SPECIFY)
monitor BMP, CBC, glucose, trend renal indices, POCT, Ammonia; Lytes - Should pt be started on Feeds recommend Repletion Prior, +PRN (low BMI is High Risk for refeeding Syndrome)

## 2020-03-19 NOTE — DIETITIAN INITIAL EVALUATION ADULT. - PROBLEM SELECTOR PLAN 4
Pt w/ HCO3=18, AG=24, most likely 2/2 poor perfusion 2/2 STEMI, w/ elevated lactic acid and uremia  -trend lactate to clearance  -trend BUN  -sent lactate  -pt dry on exam, will consider fluids  -f/u U/A  -serum osmolal gap of 7

## 2020-03-19 NOTE — DIETITIAN INITIAL EVALUATION ADULT. - ADD RECOMMEND
Monitor for feeding tolerance - EN/PO. MVI Daily, Vit C 500mg per day. Monitor Skin, GI distress, Labs, Wts, GOC. RD to remain available for additional nutrition interventions as needed.

## 2020-03-19 NOTE — DIETITIAN INITIAL EVALUATION ADULT. - PROBLEM SELECTOR PLAN 1
STEMI 2/2 Takusubo: Pt brought in by EMS, ST elevation on EKG in lateral leads. STEMI protocol initiated. s/p cardiac cath showing LV EF=15-20%, severe apical ballooning, akinesis suggestive of Takutsobo Cardiomyopathy. No stents placed. LVEDP=17mmhg+ normal EDP pre and post angio, anterior and apical akinesis. LAD=70% diffuse stenosis, Lcx=30-40% stenosis, RCA=large One vessel coronary artery disease, Frailty score=7, RCA=mild luminal irregularities. [place] catheter placed during cath. Pt unable to provide any history. Takutsobo most likely 2/2 sepsis vs shock vs stress/emotional vs drug induced vs 2/2 underlying HF  -c/w radial artery check w/ radial band. Pt hand is cyanotic, however was noted to be cyanotic per EMS before procedure  -f/u repeat coags  -f/u ekg  -will aspirin load, and start high dose statin  -f/u repeat echo

## 2020-03-19 NOTE — SWALLOW BEDSIDE ASSESSMENT ADULT - SLP GENERAL OBSERVATIONS
Pt received awake and alert. Pt did not verbalize or vocalize, or make attempts. Pt did not follow 1-step directives. Pt w/ inconsistent slight head nod/shake in response to yes/no questions, not reliably accurate.

## 2020-03-19 NOTE — PROGRESS NOTE ADULT - PROBLEM SELECTOR PLAN 6
Pt cachectic, anorexic BMI=15.8, multiple pressure ulcers on body. Most likely has not been taken care of. No recorded home address or family contacts. Follows commands but does not speak nor swallow. S/p 500cc NS bolus to hydrate pt  -consulted SW, dietician, and S+S  -c/w D5 for nutrition  -will consider NGT and tube feeds if passes S+S  -need more information on home situation

## 2020-03-19 NOTE — CHART NOTE - NSCHARTNOTEFT_GEN_A_CORE
Upon Nutritional Assessment by the Registered Dietitian your patient was determined to meet criteria / has evidence of the following diagnosis/diagnoses:          [ ]  Mild Protein Calorie Malnutrition        [ ]  Moderate Protein Calorie Malnutrition        [ ] Severe Protein Calorie Malnutrition        [ ] Unspecified Protein Calorie Malnutrition        [x ] Underweight / BMI <19        [ ] Morbid Obesity / BMI > 40      Findings as based on:  •  Comprehensive nutrition assessment and consultation   BMI 15.8     The following diet has been recommended: Please See RD note 3/19 For Full Recs/Details      PROVIDER Section:     By signing this assessment you are acknowledging and agree with the diagnosis/diagnoses assigned by the Registered Dietitian    Comments:

## 2020-03-19 NOTE — CHART NOTE - NSCHARTNOTEFT_GEN_A_CORE
NGT placed at 3:30 pm by team. Gas bubbles auscultated and NGT was ___ on x-ray by radiology at . NGT placed at 3:30 pm by team. Gas bubbles auscultated and NGT was confirmed on x-ray by radiology at 4:11 pm. NGT placed at 3:30 pm by team. Gas bubbles auscultated and NGT was confirmed on x-ray by radiology at 4:11 pm. Pt pulled out NGT. NGT replaced at 5 pm with NGT confirmed by NGT at 5:38 pm by radiology.

## 2020-03-19 NOTE — SWALLOW BEDSIDE ASSESSMENT ADULT - NS SPL SWALLOW CLINIC TRIAL FT
Oral deficits were characterized by reduced oral grading of spoon w/ reduced labial seal, anterior spillage of liquid, and reduced bolus control and A-P transport. No hyolaryngeal elevation was appreciated upon palpation in 2/2 trials, indicative of absent pharyngeal swallow initiation. Oral suction was provided following each trial to remove remainder of bolus from oral cavity.

## 2020-03-19 NOTE — SWALLOW BEDSIDE ASSESSMENT ADULT - COMMENTS
Per Dr. Carmichael, pt ok to be sitting upright at 90 degree angle for eval.  Pt receiving supplemental O2 via HFNC.

## 2020-03-19 NOTE — PROGRESS NOTE ADULT - PROBLEM SELECTOR PLAN 4
Initial Pc=725, which uptrended to 150, and started on D5. Most likely 2/2 dehydration/FTT. S/p 500 cc NS  -c/w D5 for hypernatremia/nutrition  -trend Na w/ q6 BMP

## 2020-03-19 NOTE — DIETITIAN INITIAL EVALUATION ADULT. - OTHER INFO
80 yo F admitted 3/18 - H&P in progress, unknown PMH (Pt was not able to provide hx, and no other hx provided)  brought in by EMS with STEMI. Pt taken straight to cath lab on arrival. 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 Takutsobo most likely 2/2 sepsis vs shock vs stress/emotional vs drug induced vs 2/2 underlying HF. Noted  Metabolic acidosis, Encephalopathy - May be 2/2 SIRS/possible sepsis vs STEMI/Takotsubo Cardiomyopathy vs underlying dementia vs AGMA 2/2 uremia/elevated lactic acid; R/o Meningitis. Per flow sheets, non-verbal indicators of pain/discomfort absent. 1+ L Leg edema. GI WDL per flow sheets. Suspected deep tissue injury: L buttocks / R Shoulder / BL Heels.   Pt in CCU at this time, No diet orders - Consult- Speech Bedside Swallow Evaluation noted 3/19 d/t failed bedside dys screen.  Please see below for nutritions recommendations. 78 yo F admitted 3/18 - H&P in progress, unknown PMH (Pt was not able to provide hx, and no other hx provided)  brought in by EMS with STEMI. Pt taken straight to cath lab on arrival. 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 Takutsobo most likely 2/2 sepsis vs shock vs stress/emotional vs drug induced vs 2/2 underlying HF. Noted  Metabolic acidosis, Encephalopathy - May be 2/2 SIRS/possible sepsis vs STEMI/Takotsubo Cardiomyopathy vs underlying dementia vs AGMA 2/2 uremia/elevated lactic acid; R/o Meningitis. Per flow sheets, non-verbal indicators of pain/discomfort absent. 1+ L Leg edema. GI WDL per flow sheets. Suspected deep tissue injury: L buttocks / R Shoulder / BL Heels.   Pt in CCU at this time, No diet orders - Consult Speech Bedside Swallow Evaluation noted 3/19 d/t failed bedside dys screen; Spoke with RN/team. Reported plan for swallow evaluation later in the afternoon today, Unsure if pt to pass - reports pt is more awake/alert then prior, RN reports following commands.   Please see below for nutritions recommendations. Recs made with team.

## 2020-03-19 NOTE — PROGRESS NOTE ADULT - PROBLEM SELECTOR PLAN 7
Pt w/ large thyroid goiter found on CT chest. Low TSH=.13, and low T3=56. 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

## 2020-03-19 NOTE — DIETITIAN INITIAL EVALUATION ADULT. - DIET TYPE
NPO Prior to swallow eval - Recommend when most awake/alert; Should pt pass, recommend regular vs low sodium diet Pending %PO intake - assume to be poor; suggest use of oral nutrition supplements - ensure enlive x3/day (350kcal/20gm prot per 1 can); PO Consistency per SLP Recs.

## 2020-03-19 NOTE — PROGRESS NOTE ADULT - PROBLEM SELECTOR PLAN 1
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 STEMI protocol initiated. s/p cardiac cath showing LV EF=15-20%, severe apical ballooning, akinesis suggestive of Takutsobo Cardiomyopathy. No stents placed. LVEDP=17mmhg+ normal EDP pre and post angio, anterior and apical akinesis. LAD=70% diffuse stenosis, Lcx=30-40% stenosis, RCA=large One vessel coronary artery disease, Frailty score=7, RCA=mild luminal irregularities, diagnol artery 80% stenosis. [place] catheter placed during cath. Pt unable to provide any history. Takutsobo most likely 2/2 AG metabolic acidosis 2/2 Starvation ketosis vs SIRS vs stress/emotional vs infection (unlikely-afebrile, improving clinical exam, u/a and CXR clear). s/p 500cc NS bolus for dehydration, Euvolemic on exam today, -250cc urine output/24 hours  -radial band removed, +2 radial pulse w/ no signs of bleeding  -swan cath removed  -ekg w/ diffuse ST elevation in all leads, unchanged from prior  -s/p aspirin load  -will started aspirin, high dose statin when pass dysphagia   -c/w lopressor 2.5 mg q6 IV standing w/ goal to transition to lopressor 12.5mg BID PO as beneficial for takutsobo  -S+S consulted  -f/u repeat echo, her current EF is expected to improve w/ time w/ takutsobo

## 2020-03-19 NOTE — SWALLOW BEDSIDE ASSESSMENT ADULT - SWALLOW EVAL: DIAGNOSIS
At least mild-mod oral deficits and suspect pharyngeal dysphagia given suspected absent pharyngeal swallow initiation. Deficits may be confounded by AMS. Given clinical presentation and communication deficits, recs for neuro referral. Recs to continue NPO w/ short-term means of alternative nutrition/hydration/medication via NGT. D/w MD. This SVC will f/u.

## 2020-03-19 NOTE — PROGRESS NOTE ADULT - PROBLEM SELECTOR PLAN 3
Pt w/ HCO3=18, AG=24, elevated beta hydroxybutyrate 5.1, most likely 2/2 starvation ketosis 2/2 FTT   -c/w D5 for hypernatremia/nutrition  -consulted dietician and SW  -need collateral on living situation/family

## 2020-03-19 NOTE — PROGRESS NOTE ADULT - ASSESSMENT
80 yo F unknown PMH brought in by EMS w/ STEMI s/p cardiac cath found to have LV EF=15-20%, severe apical ballooning, akinesis suggestive of Takutsobo Cardiomyopathy (no stents placed). Now w/ improving metabolic encepholopathy, hypernatremia, AG Metabolic acidosis 2/2 starvation ketosis, and failure to thrive

## 2020-03-19 NOTE — PROGRESS NOTE ADULT - SUBJECTIVE AND OBJECTIVE BOX
OVERNIGHT EVENTS: Aspirin loaded, 500 cc NS given as pt dry on exam (tachycardic, minimal urine on bladder scan), dessatted to 91% on RA, put on 3L NC, became tachypenic, ABG showed respiratory alkalosis,     SUBJECTIVE / INTERVAL HPI: Patient seen and examined at bedside.     VITAL SIGNS:  Vital Signs Last 24 Hrs  T(C): 36.6 (19 Mar 2020 09:00), Max: 37.2 (19 Mar 2020 00:24)  T(F): 97.9 (19 Mar 2020 09:00), Max: 98.9 (19 Mar 2020 00:24)  HR: 81 (19 Mar 2020 09:00) (81 - 121)  BP: 103/66 (19 Mar 2020 09:00) (84/54 - 178/69)  BP(mean): 78 (19 Mar 2020 09:00) (63 - 119)  RR: 23 (19 Mar 2020 09:00) (15 - 36)  SpO2: 98% (19 Mar 2020 09:00) (90% - 100%)    PHYSICAL EXAM:    General: WDWN  HEENT: NC/AT; PERRL, anicteric sclera; MMM  Neck: supple  Cardiovascular: +S1/S2; RRR  Respiratory: CTA B/L; no W/R/R  Gastrointestinal: soft, NT/ND; +BSx4  Extremities: WWP; no edema, clubbing or cyanosis  Vascular: 2+ radial, DP/PT pulses B/L  Neurological: AAOx3; no focal deficits    MEDICATIONS:  MEDICATIONS  (STANDING):  cefTRIAXone   IVPB 2000 milliGRAM(s) IV Intermittent every 24 hours  dextrose 5%. 1000 milliLiter(s) (60 mL/Hr) IV Continuous <Continuous>  enoxaparin Injectable 40 milliGRAM(s) SubCutaneous every 24 hours  metoprolol tartrate Injectable 2.5 milliGRAM(s) IV Push every 6 hours  potassium phosphate IVPB 15 milliMole(s) IV Intermittent once    MEDICATIONS  (PRN):      ALLERGIES:  Allergies    No Known Allergies    Intolerances        LABS:                        14.9   17.46 )-----------( 175      ( 19 Mar 2020 02:29 )             43.9     03-19    150<H>  |  118<H>  |  54<H>  ----------------------------<  226<H>  4.6   |  19<L>  |  0.63    Ca    8.7      19 Mar 2020 07:27  Phos  2.1     03-19  Mg     1.8     03-19    TPro  5.0<L>  /  Alb  2.4<L>  /  TBili  0.4  /  DBili  x   /  AST  41<H>  /  ALT  16  /  AlkPhos  35<L>  03-19    PT/INR - ( 19 Mar 2020 02:29 )   PT: 18.0 sec;   INR: 1.56          PTT - ( 19 Mar 2020 02:29 )  PTT:27.6 sec  Urinalysis Basic - ( 19 Mar 2020 01:10 )    Color: Yellow / Appearance: Clear / SG: <=1.005 / pH: x  Gluc: x / Ketone: 15 mg/dL  / Bili: Moderate / Urobili: 1.0 E.U./dL   Blood: x / Protein: Trace mg/dL / Nitrite: NEGATIVE   Leuk Esterase: NEGATIVE / RBC: < 5 /HPF / WBC < 5 /HPF   Sq Epi: x / Non Sq Epi: 0-5 /HPF / Bacteria: Present /HPF      CAPILLARY BLOOD GLUCOSE      POCT Blood Glucose.: 166 mg/dL (19 Mar 2020 06:32)      RADIOLOGY & ADDITIONAL TESTS: Reviewed.    ASSESSMENT:    PLAN: OVERNIGHT EVENTS: Failed bedside S+S. Aspirin loaded rectally, 500 cc NS given as pt dry on exam (tachycardic, minimal urine on bladder scan), dessatted to 91% on RA, put on 3L NC, became tachypneic, ABG showed respiratory alkalosis, put on HFNC. Hypernatremic Na 150, started on D5. Concern for meningitis, given ceftriaxone and vancomycin and started on thiamine. CTH showed parenchymal volume loss/atrophy. CT chest showed gas bubble in L subclavian vein, b/l adrenal hyperplasia w/ 1.7cm nodule in L adrenal gland and a large thyroid goiter.     SUBJECTIVE / INTERVAL HPI: Patient seen and examined at bedside. Follows commands but does not speak. Indicates w/ head nods that she is pain free and nothing is bother her. Denies chest pain or sob    VITAL SIGNS:  Vital Signs Last 24 Hrs  T(C): 36.6 (19 Mar 2020 09:00), Max: 37.2 (19 Mar 2020 00:24)  T(F): 97.9 (19 Mar 2020 09:00), Max: 98.9 (19 Mar 2020 00:24)  HR: 81 (19 Mar 2020 09:00) (81 - 121)  BP: 103/66 (19 Mar 2020 09:00) (84/54 - 178/69)  BP(mean): 78 (19 Mar 2020 09:00) (63 - 119)  RR: 23 (19 Mar 2020 09:00) (15 - 36)  SpO2: 98% (19 Mar 2020 09:00) (90% - 100%)    PHYSICAL EXAM:    GENERAL: breathing on HFNC comfortably no accessory muscle use or lip pursing, elderly, cachectic, follows commands, does not speak  HEAD: NC/AT  EYES: EOMI, no scleral icterus  HEENT: MMM  NECK: Supple, no JVD  LUNG: diminished sounds, CTAB b/l, no crackles or wheezing    HEART: tachycardic, +s1 +s2  ABDOMEN: normal BS, no pain w/ deep palpation in all quadrants  EXTREMITIES: No leg edema b/l, right hand cyanotic w/ radial band  VASCULAR: 2+ radial pulse, multiple ulcers on toes/feet/back  CNS: Pt not responding to questions    MEDICATIONS:  MEDICATIONS  (STANDING):  cefTRIAXone   IVPB 2000 milliGRAM(s) IV Intermittent every 24 hours  dextrose 5%. 1000 milliLiter(s) (60 mL/Hr) IV Continuous <Continuous>  enoxaparin Injectable 40 milliGRAM(s) SubCutaneous every 24 hours  metoprolol tartrate Injectable 2.5 milliGRAM(s) IV Push every 6 hours  potassium phosphate IVPB 15 milliMole(s) IV Intermittent once    MEDICATIONS  (PRN):      ALLERGIES:  Allergies    No Known Allergies    Intolerances        LABS:                        14.9   17.46 )-----------( 175      ( 19 Mar 2020 02:29 )             43.9     03-19    150<H>  |  118<H>  |  54<H>  ----------------------------<  226<H>  4.6   |  19<L>  |  0.63    Ca    8.7      19 Mar 2020 07:27  Phos  2.1     03-19  Mg     1.8     03-19    TPro  5.0<L>  /  Alb  2.4<L>  /  TBili  0.4  /  DBili  x   /  AST  41<H>  /  ALT  16  /  AlkPhos  35<L>  03-19    PT/INR - ( 19 Mar 2020 02:29 )   PT: 18.0 sec;   INR: 1.56          PTT - ( 19 Mar 2020 02:29 )  PTT:27.6 sec  Urinalysis Basic - ( 19 Mar 2020 01:10 )    Color: Yellow / Appearance: Clear / SG: <=1.005 / pH: x  Gluc: x / Ketone: 15 mg/dL  / Bili: Moderate / Urobili: 1.0 E.U./dL   Blood: x / Protein: Trace mg/dL / Nitrite: NEGATIVE   Leuk Esterase: NEGATIVE / RBC: < 5 /HPF / WBC < 5 /HPF   Sq Epi: x / Non Sq Epi: 0-5 /HPF / Bacteria: Present /HPF      CAPILLARY BLOOD GLUCOSE      POCT Blood Glucose.: 166 mg/dL (19 Mar 2020 06:32)      RADIOLOGY & ADDITIONAL TESTS: Reviewed.    ASSESSMENT:    PLAN: OVERNIGHT EVENTS: Failed bedside S+S. Aspirin loaded rectally, 500 cc NS given as pt dry on exam (tachycardic, minimal urine on bladder scan), dessatted to 91% on RA, put on 4L NC, became tachypneic, ABG showed respiratory alkalosis, put on HFNC. Hypernatremic Na 150, started on D5. Concern for meningitis, given ceftriaxone and vancomycin and started on thiamine. CTH showed parenchymal volume loss/atrophy. CT chest showed gas bubble in L subclavian vein, b/l adrenal hyperplasia w/ 1.7cm nodule in L adrenal gland and a large thyroid goiter.     SUBJECTIVE / INTERVAL HPI: Patient seen and examined at bedside. Follows commands but does not speak. Indicates w/ head nods that she is pain free and nothing is bother her. Denies chest pain or sob    VITAL SIGNS:  Vital Signs Last 24 Hrs  T(C): 36.6 (19 Mar 2020 09:00), Max: 37.2 (19 Mar 2020 00:24)  T(F): 97.9 (19 Mar 2020 09:00), Max: 98.9 (19 Mar 2020 00:24)  HR: 81 (19 Mar 2020 09:00) (81 - 121)  BP: 103/66 (19 Mar 2020 09:00) (84/54 - 178/69)  BP(mean): 78 (19 Mar 2020 09:00) (63 - 119)  RR: 23 (19 Mar 2020 09:00) (15 - 36)  SpO2: 98% (19 Mar 2020 09:00) (90% - 100%)    PHYSICAL EXAM:    GENERAL: breathing on HFNC comfortably no accessory muscle use or lip pursing, elderly, cachectic, follows commands, does not speak  HEAD: NC/AT  EYES: EOMI, no scleral icterus  HEENT: MMM  NECK: Supple, no JVD  LUNG: diminished sounds, CTAB b/l, no crackles or wheezing    HEART: tachycardic, +s1 +s2, 3/6 systolic murmur LLSB  ABDOMEN: normal BS, no pain w/ deep palpation in all quadrants  EXTREMITIES: No leg edema b/l, right hand warm/normal skin color, normal capillary refill  VASCULAR: 2+ radial pulse b/l, multiple ulcers on shoulders/back covered w/ dressing  CNS: sensation intact in arms and legs, normal pupillary response to light, 4/5 strength in arms and legs b/l, slightpronator drift right arm, no obvious facial assymetry/droop    MEDICATIONS:  MEDICATIONS  (STANDING):  cefTRIAXone   IVPB 2000 milliGRAM(s) IV Intermittent every 24 hours  dextrose 5%. 1000 milliLiter(s) (60 mL/Hr) IV Continuous <Continuous>  enoxaparin Injectable 40 milliGRAM(s) SubCutaneous every 24 hours  metoprolol tartrate Injectable 2.5 milliGRAM(s) IV Push every 6 hours  potassium phosphate IVPB 15 milliMole(s) IV Intermittent once    MEDICATIONS  (PRN):      ALLERGIES:  Allergies    No Known Allergies    Intolerances        LABS:                        14.9   17.46 )-----------( 175      ( 19 Mar 2020 02:29 )             43.9     03-19    150<H>  |  118<H>  |  54<H>  ----------------------------<  226<H>  4.6   |  19<L>  |  0.63    Ca    8.7      19 Mar 2020 07:27  Phos  2.1     03-19  Mg     1.8     03-19    TPro  5.0<L>  /  Alb  2.4<L>  /  TBili  0.4  /  DBili  x   /  AST  41<H>  /  ALT  16  /  AlkPhos  35<L>  03-19    PT/INR - ( 19 Mar 2020 02:29 )   PT: 18.0 sec;   INR: 1.56          PTT - ( 19 Mar 2020 02:29 )  PTT:27.6 sec  Urinalysis Basic - ( 19 Mar 2020 01:10 )    Color: Yellow / Appearance: Clear / SG: <=1.005 / pH: x  Gluc: x / Ketone: 15 mg/dL  / Bili: Moderate / Urobili: 1.0 E.U./dL   Blood: x / Protein: Trace mg/dL / Nitrite: NEGATIVE   Leuk Esterase: NEGATIVE / RBC: < 5 /HPF / WBC < 5 /HPF   Sq Epi: x / Non Sq Epi: 0-5 /HPF / Bacteria: Present /HPF      CAPILLARY BLOOD GLUCOSE      POCT Blood Glucose.: 166 mg/dL (19 Mar 2020 06:32)      RADIOLOGY & ADDITIONAL TESTS: Reviewed.    ASSESSMENT:    PLAN:

## 2020-03-19 NOTE — PROGRESS NOTE ADULT - PROBLEM SELECTOR PLAN 5
pt dessatted to 91% on RA, put on 4L NC, became tachypneic, ABG showed respiratory alkalosis, put on HFNC. May be 2/2 500cc NS bolus she received for dehydration vs AG metabolic acidosis. CT chest showed no signs of consolidation/pleural edema. Currently comfortable w/ no accessory muscle use  -ween of HFNC

## 2020-03-19 NOTE — DIETITIAN INITIAL EVALUATION ADULT. - PROBLEM SELECTOR PLAN 2
tachycardic , leukocytosis wbc 14, lymphopenia, hypothermic 96.7F, may be 2/2 infection vs reactive to cath  -sent RVP  -f/u U/A, RVP, bcx, lactate, u/a, utox, rvp  -hold off on fluids as pt EF 15-20%
compound gel/yes

## 2020-03-20 DIAGNOSIS — J96.01 ACUTE RESPIRATORY FAILURE WITH HYPOXIA: ICD-10-CM

## 2020-03-20 DIAGNOSIS — J98.09 OTHER DISEASES OF BRONCHUS, NOT ELSEWHERE CLASSIFIED: ICD-10-CM

## 2020-03-20 DIAGNOSIS — J18.9 PNEUMONIA, UNSPECIFIED ORGANISM: ICD-10-CM

## 2020-03-20 LAB
ALBUMIN SERPL ELPH-MCNC: 2.5 G/DL — LOW (ref 3.3–5)
ALP SERPL-CCNC: 47 U/L — SIGNIFICANT CHANGE UP (ref 40–120)
ALT FLD-CCNC: 15 U/L — SIGNIFICANT CHANGE UP (ref 10–45)
AMMONIA BLD-MCNC: <10 UMOL/L — LOW (ref 11–55)
ANION GAP SERPL CALC-SCNC: 8 MMOL/L — SIGNIFICANT CHANGE UP (ref 5–17)
ANION GAP SERPL CALC-SCNC: 9 MMOL/L — SIGNIFICANT CHANGE UP (ref 5–17)
ANION GAP SERPL CALC-SCNC: 9 MMOL/L — SIGNIFICANT CHANGE UP (ref 5–17)
AST SERPL-CCNC: 32 U/L — SIGNIFICANT CHANGE UP (ref 10–40)
BASOPHILS # BLD AUTO: 0.02 K/UL — SIGNIFICANT CHANGE UP (ref 0–0.2)
BASOPHILS NFR BLD AUTO: 0.1 % — SIGNIFICANT CHANGE UP (ref 0–2)
BILIRUB SERPL-MCNC: 0.5 MG/DL — SIGNIFICANT CHANGE UP (ref 0.2–1.2)
BUN SERPL-MCNC: 38 MG/DL — HIGH (ref 7–23)
BUN SERPL-MCNC: 42 MG/DL — HIGH (ref 7–23)
BUN SERPL-MCNC: 45 MG/DL — HIGH (ref 7–23)
CALCIUM SERPL-MCNC: 9.1 MG/DL — SIGNIFICANT CHANGE UP (ref 8.4–10.5)
CALCIUM SERPL-MCNC: 9.2 MG/DL — SIGNIFICANT CHANGE UP (ref 8.4–10.5)
CALCIUM SERPL-MCNC: 9.6 MG/DL — SIGNIFICANT CHANGE UP (ref 8.4–10.5)
CHLORIDE SERPL-SCNC: 109 MMOL/L — HIGH (ref 96–108)
CHLORIDE SERPL-SCNC: 110 MMOL/L — HIGH (ref 96–108)
CHLORIDE SERPL-SCNC: 110 MMOL/L — HIGH (ref 96–108)
CO2 SERPL-SCNC: 24 MMOL/L — SIGNIFICANT CHANGE UP (ref 22–31)
CO2 SERPL-SCNC: 25 MMOL/L — SIGNIFICANT CHANGE UP (ref 22–31)
CO2 SERPL-SCNC: 26 MMOL/L — SIGNIFICANT CHANGE UP (ref 22–31)
CREAT SERPL-MCNC: 0.47 MG/DL — LOW (ref 0.5–1.3)
CREAT SERPL-MCNC: 0.51 MG/DL — SIGNIFICANT CHANGE UP (ref 0.5–1.3)
CREAT SERPL-MCNC: 0.56 MG/DL — SIGNIFICANT CHANGE UP (ref 0.5–1.3)
D DIMER BLD IA.RAPID-MCNC: 335 NG/ML DDU — HIGH
EOSINOPHIL # BLD AUTO: 0 K/UL — SIGNIFICANT CHANGE UP (ref 0–0.5)
EOSINOPHIL NFR BLD AUTO: 0 % — SIGNIFICANT CHANGE UP (ref 0–6)
GLUCOSE BLDC GLUCOMTR-MCNC: 112 MG/DL — HIGH (ref 70–99)
GLUCOSE BLDC GLUCOMTR-MCNC: 152 MG/DL — HIGH (ref 70–99)
GLUCOSE BLDC GLUCOMTR-MCNC: 164 MG/DL — HIGH (ref 70–99)
GLUCOSE SERPL-MCNC: 131 MG/DL — HIGH (ref 70–99)
GLUCOSE SERPL-MCNC: 135 MG/DL — HIGH (ref 70–99)
GLUCOSE SERPL-MCNC: 138 MG/DL — HIGH (ref 70–99)
HCT VFR BLD CALC: 42.3 % — SIGNIFICANT CHANGE UP (ref 34.5–45)
HGB BLD-MCNC: 13.9 G/DL — SIGNIFICANT CHANGE UP (ref 11.5–15.5)
IMM GRANULOCYTES NFR BLD AUTO: 0.4 % — SIGNIFICANT CHANGE UP (ref 0–1.5)
LYMPHOCYTES # BLD AUTO: 1.1 K/UL — SIGNIFICANT CHANGE UP (ref 1–3.3)
LYMPHOCYTES # BLD AUTO: 7 % — LOW (ref 13–44)
MAGNESIUM SERPL-MCNC: 2.2 MG/DL — SIGNIFICANT CHANGE UP (ref 1.6–2.6)
MAGNESIUM SERPL-MCNC: 2.4 MG/DL — SIGNIFICANT CHANGE UP (ref 1.6–2.6)
MAGNESIUM SERPL-MCNC: 2.4 MG/DL — SIGNIFICANT CHANGE UP (ref 1.6–2.6)
MCHC RBC-ENTMCNC: 29.6 PG — SIGNIFICANT CHANGE UP (ref 27–34)
MCHC RBC-ENTMCNC: 32.9 GM/DL — SIGNIFICANT CHANGE UP (ref 32–36)
MCV RBC AUTO: 90 FL — SIGNIFICANT CHANGE UP (ref 80–100)
MONOCYTES # BLD AUTO: 1.23 K/UL — HIGH (ref 0–0.9)
MONOCYTES NFR BLD AUTO: 7.9 % — SIGNIFICANT CHANGE UP (ref 2–14)
NEUTROPHILS # BLD AUTO: 13.24 K/UL — HIGH (ref 1.8–7.4)
NEUTROPHILS NFR BLD AUTO: 84.6 % — HIGH (ref 43–77)
NRBC # BLD: 0 /100 WBCS — SIGNIFICANT CHANGE UP (ref 0–0)
NT-PROBNP SERPL-SCNC: HIGH PG/ML (ref 0–300)
PHOSPHATE SERPL-MCNC: 1.9 MG/DL — LOW (ref 2.5–4.5)
PHOSPHATE SERPL-MCNC: 2.8 MG/DL — SIGNIFICANT CHANGE UP (ref 2.5–4.5)
PHOSPHATE SERPL-MCNC: 2.9 MG/DL — SIGNIFICANT CHANGE UP (ref 2.5–4.5)
PLATELET # BLD AUTO: 126 K/UL — LOW (ref 150–400)
POTASSIUM SERPL-MCNC: 4.6 MMOL/L — SIGNIFICANT CHANGE UP (ref 3.5–5.3)
POTASSIUM SERPL-MCNC: 4.7 MMOL/L — SIGNIFICANT CHANGE UP (ref 3.5–5.3)
POTASSIUM SERPL-MCNC: 4.8 MMOL/L — SIGNIFICANT CHANGE UP (ref 3.5–5.3)
POTASSIUM SERPL-SCNC: 4.6 MMOL/L — SIGNIFICANT CHANGE UP (ref 3.5–5.3)
POTASSIUM SERPL-SCNC: 4.7 MMOL/L — SIGNIFICANT CHANGE UP (ref 3.5–5.3)
POTASSIUM SERPL-SCNC: 4.8 MMOL/L — SIGNIFICANT CHANGE UP (ref 3.5–5.3)
PROT SERPL-MCNC: 5.2 G/DL — LOW (ref 6–8.3)
RBC # BLD: 4.7 M/UL — SIGNIFICANT CHANGE UP (ref 3.8–5.2)
RBC # FLD: 15.4 % — HIGH (ref 10.3–14.5)
SODIUM SERPL-SCNC: 143 MMOL/L — SIGNIFICANT CHANGE UP (ref 135–145)
SODIUM SERPL-SCNC: 143 MMOL/L — SIGNIFICANT CHANGE UP (ref 135–145)
SODIUM SERPL-SCNC: 144 MMOL/L — SIGNIFICANT CHANGE UP (ref 135–145)
WBC # BLD: 15.65 K/UL — HIGH (ref 3.8–10.5)
WBC # FLD AUTO: 15.65 K/UL — HIGH (ref 3.8–10.5)

## 2020-03-20 PROCEDURE — 99221 1ST HOSP IP/OBS SF/LOW 40: CPT | Mod: GC

## 2020-03-20 PROCEDURE — 99291 CRITICAL CARE FIRST HOUR: CPT

## 2020-03-20 PROCEDURE — 71045 X-RAY EXAM CHEST 1 VIEW: CPT | Mod: 26

## 2020-03-20 PROCEDURE — 93970 EXTREMITY STUDY: CPT | Mod: 26

## 2020-03-20 PROCEDURE — 71275 CT ANGIOGRAPHY CHEST: CPT | Mod: 26

## 2020-03-20 RX ORDER — POTASSIUM PHOSPHATE, MONOBASIC POTASSIUM PHOSPHATE, DIBASIC 236; 224 MG/ML; MG/ML
15 INJECTION, SOLUTION INTRAVENOUS ONCE
Refills: 0 | Status: COMPLETED | OUTPATIENT
Start: 2020-03-20 | End: 2020-03-20

## 2020-03-20 RX ORDER — ACETYLCYSTEINE 200 MG/ML
3 VIAL (ML) MISCELLANEOUS THREE TIMES A DAY
Refills: 0 | Status: DISCONTINUED | OUTPATIENT
Start: 2020-03-20 | End: 2020-03-20

## 2020-03-20 RX ORDER — ACETYLCYSTEINE 200 MG/ML
3 VIAL (ML) MISCELLANEOUS THREE TIMES A DAY
Refills: 0 | Status: DISCONTINUED | OUTPATIENT
Start: 2020-03-20 | End: 2020-03-25

## 2020-03-20 RX ORDER — CEFTRIAXONE 500 MG/1
1000 INJECTION, POWDER, FOR SOLUTION INTRAMUSCULAR; INTRAVENOUS EVERY 24 HOURS
Refills: 0 | Status: COMPLETED | OUTPATIENT
Start: 2020-03-20 | End: 2020-03-24

## 2020-03-20 RX ORDER — IPRATROPIUM/ALBUTEROL SULFATE 18-103MCG
3 AEROSOL WITH ADAPTER (GRAM) INHALATION THREE TIMES A DAY
Refills: 0 | Status: DISCONTINUED | OUTPATIENT
Start: 2020-03-20 | End: 2020-03-25

## 2020-03-20 RX ORDER — IPRATROPIUM/ALBUTEROL SULFATE 18-103MCG
3 AEROSOL WITH ADAPTER (GRAM) INHALATION EVERY 6 HOURS
Refills: 0 | Status: DISCONTINUED | OUTPATIENT
Start: 2020-03-20 | End: 2020-03-20

## 2020-03-20 RX ADMIN — Medication 12.5 MILLIGRAM(S): at 06:44

## 2020-03-20 RX ADMIN — Medication 12.5 MILLIGRAM(S): at 20:01

## 2020-03-20 RX ADMIN — CHLORHEXIDINE GLUCONATE 1 APPLICATION(S): 213 SOLUTION TOPICAL at 06:46

## 2020-03-20 RX ADMIN — Medication 1 TABLET(S): at 18:05

## 2020-03-20 RX ADMIN — Medication 1: at 13:00

## 2020-03-20 RX ADMIN — Medication 105 MILLIGRAM(S): at 06:46

## 2020-03-20 RX ADMIN — Medication 500 MILLIGRAM(S): at 18:09

## 2020-03-20 RX ADMIN — ATORVASTATIN CALCIUM 80 MILLIGRAM(S): 80 TABLET, FILM COATED ORAL at 18:05

## 2020-03-20 RX ADMIN — POTASSIUM PHOSPHATE, MONOBASIC POTASSIUM PHOSPHATE, DIBASIC 63.75 MILLIMOLE(S): 236; 224 INJECTION, SOLUTION INTRAVENOUS at 20:21

## 2020-03-20 RX ADMIN — Medication 1: at 00:03

## 2020-03-20 RX ADMIN — ENOXAPARIN SODIUM 30 MILLIGRAM(S): 100 INJECTION SUBCUTANEOUS at 06:44

## 2020-03-20 RX ADMIN — Medication 81 MILLIGRAM(S): at 13:35

## 2020-03-20 RX ADMIN — POTASSIUM PHOSPHATE, MONOBASIC POTASSIUM PHOSPHATE, DIBASIC 63.75 MILLIMOLE(S): 236; 224 INJECTION, SOLUTION INTRAVENOUS at 00:13

## 2020-03-20 RX ADMIN — Medication 1: at 19:00

## 2020-03-20 RX ADMIN — CEFTRIAXONE 100 MILLIGRAM(S): 500 INJECTION, POWDER, FOR SOLUTION INTRAMUSCULAR; INTRAVENOUS at 17:00

## 2020-03-20 NOTE — CONSULT NOTE ADULT - ATTENDING COMMENTS
Little hx available.  Patient able to answer simple yes/no questions and move all extremities to command but non-verbal. Cachectic appearing.  Chest clear to limited exam, no wheezing.  CT shows no evidence of pulm embolism, atelectasis LLL and areas of subsegmental atelectasis. Cannot r/o endobronchial mass occluding LLL bronchus. Treat w aggressive pulmonary toilet for atelectasis.   Poor candidate for invasive w/u for radiographic abnormalities because of both heart disease and overall frailty.  Suggest send sputum cytology if produces sputum.

## 2020-03-20 NOTE — PROGRESS NOTE ADULT - SUBJECTIVE AND OBJECTIVE BOX
incomplete OVERNIGHT EVENTS: 10 PM Eh=043, free water adjusted to 200cc TID. Desatted 92% on HFNC which resolved, leg dopplers ordered    SUBJECTIVE / INTERVAL HPI: Patient seen and examined at bedside. Follows commands but does not speak. Indicates w/ head nods that she is pain free and nothing is bothering her.     T(F): 99.1 (03-20-20 @ 10:00)  HR: 70 (03-20-20 @ 10:00)  BP: 105/64 (03-20-20 @ 10:00)  RR: 18 (03-20-20 @ 10:00)  SpO2: 99% (03-20-20 @ 10:00)    PHYSICAL EXAM:    GENERAL: breathing on HFNC comfortably no accessory muscle use or lip pursing, elderly, cachectic, follows commands, does not speak  HEAD: NC/AT  EYES: EOMI, no scleral icterus  HEENT: MMM  NECK: Supple, no JVD  LUNG: diminished sounds, CTAB b/l, no crackles or wheezing    HEART: RRR, s1+ s2+  ABDOMEN: hypoactive BS, no pain w/ deep palpation in all quadrants  EXTREMITIES: No leg edema b/l   VASCULAR: 2+ radial pulse b/l, multiple ulcers on shoulders/back covered w/ dressing  CNS: no focal deficits, unable to obtain further information as pt not speaking      MEDICATIONS  (STANDING):  ascorbic acid 500 milliGRAM(s) Oral every 24 hours  aspirin  chewable 81 milliGRAM(s) Enteral Tube daily  atorvastatin 80 milliGRAM(s) Oral every 24 hours  chlorhexidine 2% Cloths 1 Application(s) Topical <User Schedule>  dextrose 5%. 1000 milliLiter(s) (50 mL/Hr) IV Continuous <Continuous>  dextrose 50% Injectable 12.5 Gram(s) IV Push once  dextrose 50% Injectable 25 Gram(s) IV Push once  dextrose 50% Injectable 25 Gram(s) IV Push once  enoxaparin Injectable 30 milliGRAM(s) SubCutaneous every 24 hours  insulin lispro (HumaLOG) corrective regimen sliding scale   SubCutaneous every 6 hours  metoprolol tartrate 12.5 milliGRAM(s) Oral every 12 hours  multivitamin 1 Tablet(s) Oral every 24 hours  thiamine IVPB 500 milliGRAM(s) IV Intermittent every 24 hours    MEDICATIONS  (PRN):  albuterol/ipratropium for Nebulization. 3 milliLiter(s) Nebulizer every 6 hours PRN Shortness of Breath and/or Wheezing  dextrose 40% Gel 15 Gram(s) Oral once PRN Blood Glucose LESS THAN 70 milliGRAM(s)/deciliter  glucagon  Injectable 1 milliGRAM(s) IntraMuscular once PRN Glucose LESS THAN 70 milligrams/deciliter    .  LABS:                         13.9   15.65 )-----------( 126      ( 20 Mar 2020 05:14 )             42.3     03-20    143  |  110<H>  |  45<H>  ----------------------------<  135<H>  4.8   |  24  |  0.56    Ca    9.1      20 Mar 2020 05:14  Phos  2.8     03-20  Mg     2.4     03-20    TPro  5.2<L>  /  Alb  2.5<L>  /  TBili  0.5  /  DBili  x   /  AST  32  /  ALT  15  /  AlkPhos  47  03-20    PT/INR - ( 19 Mar 2020 02:29 )   PT: 18.0 sec;   INR: 1.56          PTT - ( 19 Mar 2020 02:29 )  PTT:27.6 sec  Urinalysis Basic - ( 19 Mar 2020 01:10 )    Color: Yellow / Appearance: Clear / SG: <=1.005 / pH: x  Gluc: x / Ketone: 15 mg/dL  / Bili: Moderate / Urobili: 1.0 E.U./dL   Blood: x / Protein: Trace mg/dL / Nitrite: NEGATIVE   Leuk Esterase: NEGATIVE / RBC: < 5 /HPF / WBC < 5 /HPF   Sq Epi: x / Non Sq Epi: 0-5 /HPF / Bacteria: Present /HPF      CARDIAC MARKERS ( 19 Mar 2020 02:29 )  x     / 0.94 ng/mL / 150 U/L / x     / 31.1 ng/mL  CARDIAC MARKERS ( 18 Mar 2020 17:18 )  x     / 1.10 ng/mL / 193 U/L / x     / 47.0 ng/mL            RADIOLOGY, EKG & ADDITIONAL TESTS: Reviewed.

## 2020-03-20 NOTE — PROGRESS NOTE ADULT - PROBLEM SELECTOR PLAN 4
Pt w/ HCO3=18, AG=24, elevated beta hydroxybutyrate 5.1, most likely 2/2 starvation ketosis 2/2 FTT. S/p D5  -NOW RESOLVED

## 2020-03-20 NOTE — PROGRESS NOTE ADULT - PROBLEM SELECTOR PLAN 2
Pt presenting w/ no provided hx by EMS, and not answering questions on exam. Does follow commands. Most likely 2/2 behavioral (recent hx of APS case involvement, reported to unkept at home) vs wernicke/korsakoff (no nystagmus on exam) vs underlying dementia (CTH=microvascular ischemic changes + parenchymal volume loss) vs infection (less likely as afebrile, RVP neg, CXR clear/no cough, u/a clear of infection, improving mental function) vs stroke (no focal deficits, 5/5 muscle strength) vs toxins (utox neg).    Pt more awake and alert today, still not speaking but follows commands     -appreciate dietician and SW recs   -bcx NGTD, lactate cleared  -c/w thiamine for possible wernicke  -will monitor how pt does, if not improved, would consider MRI    #dysphagia: unclear as why pt cant initiate swalling reflex, most likely 2/2 encepholopathy. S+S recommended NGT  -S+S will reasses   -c/w NGT  -pt on wrist restraints as pulled of mittens and NGT

## 2020-03-20 NOTE — PROGRESS NOTE ADULT - PROBLEM SELECTOR PLAN 5
Initial Qn=674, which uptrended to 150, and started on D5. Most likely 2/2 dehydration/FTT. S/p 500 cc NS. s/p D5 for hypernatremia/nutrition  -NOW RESOLVED

## 2020-03-20 NOTE — PROGRESS NOTE ADULT - PROBLEM SELECTOR PLAN 1
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 STEMI protocol initiated. s/p cardiac cath showing LV EF=15-20%, severe apical ballooning, akinesis suggestive of Takutsobo Cardiomyopathy. No stents placed.  Pt unable to provide any history. Takutsobo most likely 2/2 AG metabolic acidosis 2/2 Starvation ketosis vs SIRS vs stress/emotional vs infection (unlikely-afebrile, improving clinical exam, u/a and CXR clear). s/p 500cc NS bolus for dehydration, Euvolemic on exam today, -255cc urine output/24 hours  -radial band removed, +2 radial pulse w/ no signs of bleeding  -swan cath removed  -ekg w/ diffuse ST elevation in all leads, slightly improved from prior  -NGT in place: c/w aspirin 81mg and lipitor 80mg qd   -c/w PO lopressor 12.5 mg q12 as beneficial for takutsobo w/ increased pre-load capabality and reduction of tachycardic episodes to allow proper filling of ventricles  -3/20 echo: Aortic valve is calcified, no aortic regurgitation, mitral valve is moderately thickened. Systolic anterior motion of the mitral valve resulting in an LVOT gradient of 62.00 mmHg and severe LVOT obstruction. Moderate mitral regurgitation. Hypertrophy of the basal septum. Left ventricular ejection fraction is 21%. Normal basal wall motion. Rest of the segments are severely hypokinetic to akinetic. Consider stress induced cardiomyopathy.

## 2020-03-20 NOTE — CONSULT NOTE ADULT - SUBJECTIVE AND OBJECTIVE BOX
PULMONARY SERVICE INITIAL CONSULT NOTE    HPI:  HPI: 78 yo F unknown PMH brought in by EMS with what was thought to be a STEMI, pt went for Urgent cath and was found to have nonobstructive CAD and Takatsubo cardiomyopathy with an EF of 15-20%. The patient is currently being treated in the CCU. The patient has had hypoxemic respiratory failure since admission and initial CT Chest on 3/18 showed evidence of mucus plugging and small infiltrate in LLL. The patient was started on ceftriaxone for pneumonia. While getting from bed to chair the patient had an episode of desaturation today and thus was sent to CT to rule out PE. The patient was found to have essentially total LLL collapse likely from mucus plugging, but no PE. The patient is unable to provide any subjective history. She is currently on HFNC at approx 40%.     REVIEW OF SYSTEMS:  Pt unable to provide do to clinical state.     Allergy and Immunologic: No hives or eczema    PAST MEDICAL & SURGICAL HISTORY:      FAMILY HISTORY:      SOCIAL HISTORY:  Smoking Status: Unknown  Pack Years:    MEDICATIONS:  Pulmonary:  albuterol/ipratropium for Nebulization. 3 milliLiter(s) Nebulizer every 6 hours PRN    Antimicrobials:  cefTRIAXone   IVPB 1000 milliGRAM(s) IV Intermittent every 24 hours    Anticoagulants:  aspirin  chewable 81 milliGRAM(s) Enteral Tube daily  enoxaparin Injectable 30 milliGRAM(s) SubCutaneous every 24 hours    Onc:    GI/:    Endocrine:  atorvastatin 80 milliGRAM(s) Oral every 24 hours  dextrose 40% Gel 15 Gram(s) Oral once PRN  dextrose 50% Injectable 12.5 Gram(s) IV Push once  dextrose 50% Injectable 25 Gram(s) IV Push once  dextrose 50% Injectable 25 Gram(s) IV Push once  glucagon  Injectable 1 milliGRAM(s) IntraMuscular once PRN  insulin lispro (HumaLOG) corrective regimen sliding scale   SubCutaneous every 6 hours    Cardiac:  metoprolol tartrate 12.5 milliGRAM(s) Oral every 12 hours    Other Medications:  ascorbic acid 500 milliGRAM(s) Oral every 24 hours  chlorhexidine 2% Cloths 1 Application(s) Topical <User Schedule>  dextrose 5%. 1000 milliLiter(s) IV Continuous <Continuous>  multivitamin 1 Tablet(s) Oral every 24 hours  thiamine IVPB 500 milliGRAM(s) IV Intermittent every 24 hours      Allergies    No Known Allergies    Intolerances        Vital Signs Last 24 Hrs  T(C): 36.3 (20 Mar 2020 17:12), Max: 37.3 (20 Mar 2020 10:00)  T(F): 97.3 (20 Mar 2020 17:12), Max: 99.1 (20 Mar 2020 10:00)  HR: 61 (20 Mar 2020 16:02) (61 - 90)  BP: 114/76 (20 Mar 2020 13:00) (90/58 - 118/67)  BP(mean): 91 (20 Mar 2020 13:00) (67 - 91)  RR: 15 (20 Mar 2020 16:02) (13 - 30)  SpO2: 97% (20 Mar 2020 16:02) (91% - 100%)    03-19 @ 07:01  -  03-20 @ 07:00  --------------------------------------------------------  IN: 1813.5 mL / OUT: 525 mL / NET: 1288.5 mL    03-20 @ 07:01  -  03-20 @ 17:27  --------------------------------------------------------  IN: 30 mL / OUT: 95 mL / NET: -65 mL          PHYSICAL EXAM:  Constitutional: Thin, frail, cachectic   Head: Temporal wasting  EENT: PERRL, anicteric sclera; oropharynx clear, dry mucous membranes  Neck: supple, JVD noted  Respiratory: Diminshed breath sounds at left lower lung zone, otherwise clear, no wheezes, no rhonchi.   Cardiovascular: +S1/S2, RRR  Gastrointestinal: soft, NT/ND; +BSx4  Extremities: WWP, Dependent pitting edema noted.   Vascular: 2+ radial, DP/PT pulses B/L  Neurological: Awake, able to nod head, follows simple commands.     LABS:  ABG - ( 19 Mar 2020 05:33 )  pH, Arterial: 7.42  pH, Blood: x     /  pCO2: 29    /  pO2: 75    / HCO3: 18    / Base Excess: -4.4  /  SaO2: 95                  CBC Full  -  ( 20 Mar 2020 05:14 )  WBC Count : 15.65 K/uL  RBC Count : 4.70 M/uL  Hemoglobin : 13.9 g/dL  Hematocrit : 42.3 %  Platelet Count - Automated : 126 K/uL  Mean Cell Volume : 90.0 fl  Mean Cell Hemoglobin : 29.6 pg  Mean Cell Hemoglobin Concentration : 32.9 gm/dL  Auto Neutrophil # : 13.24 K/uL  Auto Lymphocyte # : 1.10 K/uL  Auto Monocyte # : 1.23 K/uL  Auto Eosinophil # : 0.00 K/uL  Auto Basophil # : 0.02 K/uL  Auto Neutrophil % : 84.6 %  Auto Lymphocyte % : 7.0 %  Auto Monocyte % : 7.9 %  Auto Eosinophil % : 0.0 %  Auto Basophil % : 0.1 %    03-20    143  |  110<H>  |  45<H>  ----------------------------<  135<H>  4.8   |  24  |  0.56    Ca    9.1      20 Mar 2020 05:14  Phos  2.8     03-20  Mg     2.4     03-20    TPro  5.2<L>  /  Alb  2.5<L>  /  TBili  0.5  /  DBili  x   /  AST  32  /  ALT  15  /  AlkPhos  47  03-20    PT/INR - ( 19 Mar 2020 02:29 )   PT: 18.0 sec;   INR: 1.56          PTT - ( 19 Mar 2020 02:29 )  PTT:27.6 sec      Urinalysis Basic - ( 19 Mar 2020 01:10 )    Color: Yellow / Appearance: Clear / SG: <=1.005 / pH: x  Gluc: x / Ketone: 15 mg/dL  / Bili: Moderate / Urobili: 1.0 E.U./dL   Blood: x / Protein: Trace mg/dL / Nitrite: NEGATIVE   Leuk Esterase: NEGATIVE / RBC: < 5 /HPF / WBC < 5 /HPF   Sq Epi: x / Non Sq Epi: 0-5 /HPF / Bacteria: Present /HPF                RADIOLOGY & ADDITIONAL STUDIES:  CT chest 3/20/20  CT chest 3/18/20

## 2020-03-20 NOTE — PROGRESS NOTE ADULT - PROBLEM SELECTOR PLAN 6
Pt cachectic, anorexic BMI=15.8, multiple pressure ulcers on body. Most likely has not been taken care of. No recorded home address or family contacts. Follows commands but does not speak nor swallow.    -consulted SW, dietician, and S+S  -NGT in place  -c/w tube feeds at a slow rate per dietician, to avoid refeeding syndrome Pt cachectic, underweight, anorexic BMI=15.8, multiple pressure ulcers on body. Most likely has not been taken care of. No recorded home address or family contacts. Follows commands but does not speak nor swallow.    -consulted SW, dietician, and S+S  -underweight per nutritionist  -NGT in place  -c/w jevity tube feeds at a slow rate per dietician, to avoid refeeding syndrome

## 2020-03-20 NOTE — PROGRESS NOTE ADULT - ASSESSMENT
78 yo F unknown PMH brought in by EMS w/ STEMI s/p cardiac cath found to have LV EF=15-20%, severe apical ballooning, akinesis suggestive of Takutsobo Cardiomyopathy (no stents placed). Now w/ improving metabolic encepholopathy, hypernatremia, AG Metabolic acidosis 2/2 starvation ketosis, and failure to thrive    3/2020 cardiac cath: LVEDP=17mmhg+ normal EDP pre and post angio, anterior and apical akinesis. LAD=70% diffuse stenosis, Lcx=30-40% stenosis, RCA=large One vessel coronary artery disease, Frailty score=7, RCA=mild luminal irregularities, diagnol artery 80% stenosis. No stents placed. LV EF=15-20%, severe apical ballooning, akinesis suggestive of Takutsobo Cardiomyopathy

## 2020-03-20 NOTE — PROGRESS NOTE ADULT - PROBLEM SELECTOR PLAN 3
pt dessatted to 91% on RA, put on 4L NC, became tachypneic, ABG showed respiratory alkalosis, put on HFNC. Most likely 2/2 to 500cc NS bolus she received for dehydration (w/ EF=20%). Less likely infectious (CT chest showed no consolidation or infiltrates, pt afebrile, bedside u/s w/o A lines) vs less likely aspiration PNA 2/2 tube feeds (L sided pleura effusion, downtrending wbc, no fever, no cough) vs less likely PE (WELLS=3, no leg erythema/swelling/non-tender, bedside u/s w/ no signs of RV strain)  -will ween of HFNC to goal NC  -will consider CT PE protocol to r/o PE if pt does not improve  -NGT and aspiration precautions in place

## 2020-03-20 NOTE — CONSULT NOTE ADULT - ASSESSMENT
This is a frail 80 y/o woman with an unclear medical hx who presented with takatsubo cardiomyopathy which is complicated by acute hypoxemic respiratory failure likely due to mucus plugging and pneumonia.

## 2020-03-21 DIAGNOSIS — I51.81 TAKOTSUBO SYNDROME: ICD-10-CM

## 2020-03-21 DIAGNOSIS — R13.10 DYSPHAGIA, UNSPECIFIED: ICD-10-CM

## 2020-03-21 DIAGNOSIS — E27.8 OTHER SPECIFIED DISORDERS OF ADRENAL GLAND: ICD-10-CM

## 2020-03-21 LAB
ANION GAP SERPL CALC-SCNC: 10 MMOL/L — SIGNIFICANT CHANGE UP (ref 5–17)
ANION GAP SERPL CALC-SCNC: 10 MMOL/L — SIGNIFICANT CHANGE UP (ref 5–17)
ANION GAP SERPL CALC-SCNC: 7 MMOL/L — SIGNIFICANT CHANGE UP (ref 5–17)
BASOPHILS # BLD AUTO: 0.08 K/UL — SIGNIFICANT CHANGE UP (ref 0–0.2)
BASOPHILS NFR BLD AUTO: 0.6 % — SIGNIFICANT CHANGE UP (ref 0–2)
BUN SERPL-MCNC: 28 MG/DL — HIGH (ref 7–23)
BUN SERPL-MCNC: 32 MG/DL — HIGH (ref 7–23)
BUN SERPL-MCNC: 36 MG/DL — HIGH (ref 7–23)
CALCIUM SERPL-MCNC: 8.9 MG/DL — SIGNIFICANT CHANGE UP (ref 8.4–10.5)
CALCIUM SERPL-MCNC: 9.2 MG/DL — SIGNIFICANT CHANGE UP (ref 8.4–10.5)
CALCIUM SERPL-MCNC: 9.3 MG/DL — SIGNIFICANT CHANGE UP (ref 8.4–10.5)
CHLORIDE SERPL-SCNC: 107 MMOL/L — SIGNIFICANT CHANGE UP (ref 96–108)
CHLORIDE SERPL-SCNC: 109 MMOL/L — HIGH (ref 96–108)
CHLORIDE SERPL-SCNC: 110 MMOL/L — HIGH (ref 96–108)
CO2 SERPL-SCNC: 24 MMOL/L — SIGNIFICANT CHANGE UP (ref 22–31)
CO2 SERPL-SCNC: 25 MMOL/L — SIGNIFICANT CHANGE UP (ref 22–31)
CO2 SERPL-SCNC: 26 MMOL/L — SIGNIFICANT CHANGE UP (ref 22–31)
CREAT SERPL-MCNC: 0.44 MG/DL — LOW (ref 0.5–1.3)
CREAT SERPL-MCNC: 0.47 MG/DL — LOW (ref 0.5–1.3)
CREAT SERPL-MCNC: 0.48 MG/DL — LOW (ref 0.5–1.3)
EOSINOPHIL # BLD AUTO: 0.01 K/UL — SIGNIFICANT CHANGE UP (ref 0–0.5)
EOSINOPHIL NFR BLD AUTO: 0.1 % — SIGNIFICANT CHANGE UP (ref 0–6)
GLUCOSE BLDC GLUCOMTR-MCNC: 127 MG/DL — HIGH (ref 70–99)
GLUCOSE BLDC GLUCOMTR-MCNC: 147 MG/DL — HIGH (ref 70–99)
GLUCOSE BLDC GLUCOMTR-MCNC: 185 MG/DL — HIGH (ref 70–99)
GLUCOSE BLDC GLUCOMTR-MCNC: 223 MG/DL — HIGH (ref 70–99)
GLUCOSE BLDC GLUCOMTR-MCNC: 85 MG/DL — SIGNIFICANT CHANGE UP (ref 70–99)
GLUCOSE SERPL-MCNC: 180 MG/DL — HIGH (ref 70–99)
GLUCOSE SERPL-MCNC: 227 MG/DL — HIGH (ref 70–99)
GLUCOSE SERPL-MCNC: 75 MG/DL — SIGNIFICANT CHANGE UP (ref 70–99)
HCT VFR BLD CALC: 39.6 % — SIGNIFICANT CHANGE UP (ref 34.5–45)
HGB BLD-MCNC: 13.3 G/DL — SIGNIFICANT CHANGE UP (ref 11.5–15.5)
IMM GRANULOCYTES NFR BLD AUTO: 0.6 % — SIGNIFICANT CHANGE UP (ref 0–1.5)
LEGIONELLA AG UR QL: NEGATIVE — SIGNIFICANT CHANGE UP
LYMPHOCYTES # BLD AUTO: 1.12 K/UL — SIGNIFICANT CHANGE UP (ref 1–3.3)
LYMPHOCYTES # BLD AUTO: 7.8 % — LOW (ref 13–44)
MAGNESIUM SERPL-MCNC: 2 MG/DL — SIGNIFICANT CHANGE UP (ref 1.6–2.6)
MAGNESIUM SERPL-MCNC: 2.1 MG/DL — SIGNIFICANT CHANGE UP (ref 1.6–2.6)
MAGNESIUM SERPL-MCNC: 2.2 MG/DL — SIGNIFICANT CHANGE UP (ref 1.6–2.6)
MCHC RBC-ENTMCNC: 29.6 PG — SIGNIFICANT CHANGE UP (ref 27–34)
MCHC RBC-ENTMCNC: 33.6 GM/DL — SIGNIFICANT CHANGE UP (ref 32–36)
MCV RBC AUTO: 88.2 FL — SIGNIFICANT CHANGE UP (ref 80–100)
MONOCYTES # BLD AUTO: 1.05 K/UL — HIGH (ref 0–0.9)
MONOCYTES NFR BLD AUTO: 7.3 % — SIGNIFICANT CHANGE UP (ref 2–14)
NEUTROPHILS # BLD AUTO: 12.04 K/UL — HIGH (ref 1.8–7.4)
NEUTROPHILS NFR BLD AUTO: 83.6 % — HIGH (ref 43–77)
NRBC # BLD: 0 /100 WBCS — SIGNIFICANT CHANGE UP (ref 0–0)
PHOSPHATE SERPL-MCNC: 2.1 MG/DL — LOW (ref 2.5–4.5)
PHOSPHATE SERPL-MCNC: 2.2 MG/DL — LOW (ref 2.5–4.5)
PHOSPHATE SERPL-MCNC: 3.1 MG/DL — SIGNIFICANT CHANGE UP (ref 2.5–4.5)
PLATELET # BLD AUTO: 107 K/UL — LOW (ref 150–400)
POTASSIUM SERPL-MCNC: 4.2 MMOL/L — SIGNIFICANT CHANGE UP (ref 3.5–5.3)
POTASSIUM SERPL-MCNC: 4.4 MMOL/L — SIGNIFICANT CHANGE UP (ref 3.5–5.3)
POTASSIUM SERPL-MCNC: 4.8 MMOL/L — SIGNIFICANT CHANGE UP (ref 3.5–5.3)
POTASSIUM SERPL-SCNC: 4.2 MMOL/L — SIGNIFICANT CHANGE UP (ref 3.5–5.3)
POTASSIUM SERPL-SCNC: 4.4 MMOL/L — SIGNIFICANT CHANGE UP (ref 3.5–5.3)
POTASSIUM SERPL-SCNC: 4.8 MMOL/L — SIGNIFICANT CHANGE UP (ref 3.5–5.3)
RBC # BLD: 4.49 M/UL — SIGNIFICANT CHANGE UP (ref 3.8–5.2)
RBC # FLD: 15.4 % — HIGH (ref 10.3–14.5)
SODIUM SERPL-SCNC: 141 MMOL/L — SIGNIFICANT CHANGE UP (ref 135–145)
SODIUM SERPL-SCNC: 142 MMOL/L — SIGNIFICANT CHANGE UP (ref 135–145)
SODIUM SERPL-SCNC: 145 MMOL/L — SIGNIFICANT CHANGE UP (ref 135–145)
WBC # BLD: 14.43 K/UL — HIGH (ref 3.8–10.5)
WBC # FLD AUTO: 14.43 K/UL — HIGH (ref 3.8–10.5)

## 2020-03-21 PROCEDURE — 71045 X-RAY EXAM CHEST 1 VIEW: CPT | Mod: 26

## 2020-03-21 PROCEDURE — 93010 ELECTROCARDIOGRAM REPORT: CPT

## 2020-03-21 RX ADMIN — Medication 81 MILLIGRAM(S): at 12:44

## 2020-03-21 RX ADMIN — Medication 1: at 18:04

## 2020-03-21 RX ADMIN — ATORVASTATIN CALCIUM 80 MILLIGRAM(S): 80 TABLET, FILM COATED ORAL at 18:01

## 2020-03-21 RX ADMIN — Medication 3 MILLILITER(S): at 13:51

## 2020-03-21 RX ADMIN — Medication 3 MILLILITER(S): at 21:59

## 2020-03-21 RX ADMIN — CHLORHEXIDINE GLUCONATE 1 APPLICATION(S): 213 SOLUTION TOPICAL at 07:05

## 2020-03-21 RX ADMIN — Medication 500 MILLIGRAM(S): at 18:04

## 2020-03-21 RX ADMIN — Medication 1 TABLET(S): at 18:00

## 2020-03-21 RX ADMIN — Medication 62.5 MILLIMOLE(S): at 10:01

## 2020-03-21 RX ADMIN — ENOXAPARIN SODIUM 30 MILLIGRAM(S): 100 INJECTION SUBCUTANEOUS at 07:05

## 2020-03-21 RX ADMIN — Medication 3 MILLILITER(S): at 06:34

## 2020-03-21 RX ADMIN — Medication 3 MILLILITER(S): at 21:58

## 2020-03-21 RX ADMIN — Medication 105 MILLIGRAM(S): at 09:13

## 2020-03-21 RX ADMIN — CEFTRIAXONE 100 MILLIGRAM(S): 500 INJECTION, POWDER, FOR SOLUTION INTRAMUSCULAR; INTRAVENOUS at 18:01

## 2020-03-21 RX ADMIN — Medication 12.5 MILLIGRAM(S): at 18:00

## 2020-03-21 RX ADMIN — Medication 2: at 12:25

## 2020-03-21 NOTE — PROGRESS NOTE ADULT - PROBLEM SELECTOR PLAN 5
unclear as to why pt can't initiate swallowing reflex, most likely 2/2 encephalopathy  -S+S - similar to prior exam, will continue to monitor  -c/w NGT  -pt on wrist restraints as pulled of mittens and NGT.

## 2020-03-21 NOTE — PROGRESS NOTE ADULT - SUBJECTIVE AND OBJECTIVE BOX
OVERNIGHT EVENTS: No acute events    SUBJECTIVE / INTERVAL HPI: Patient seen and examined at bedside.   Pt denied     VITAL SIGNS:  Vital Signs Last 24 Hrs  T(C): 36.8 (21 Mar 2020 06:07), Max: 37.3 (20 Mar 2020 10:00)  T(F): 98.2 (21 Mar 2020 06:07), Max: 99.1 (20 Mar 2020 10:00)  HR: 89 (21 Mar 2020 07:00) (58 - 89)  BP: 97/55 (21 Mar 2020 07:00) (86/50 - 116/59)  BP(mean): 69 (21 Mar 2020 07:00) (61 - 91)  RR: 18 (21 Mar 2020 07:00) (13 - 36)  SpO2: 94% (21 Mar 2020 07:00) (91% - 99%)    PHYSICAL EXAM:    General: WDWN; Patient is in NAD  HEENT: NC/AT; PERRL, anicteric sclera; MMM  Neck: supple  Cardiovascular: +S1/S2, RRR  Respiratory: CTA B/L; no W/R/R  Gastrointestinal: soft, NT/ND; +BS  Extremities: WWP; no edema present  Vascular: 2+ radial pulses B/L  Neurological: AAOx3; no focal deficits; CN II-XII intact; 5/5 strength in upper and lower extremities; sensation intact to light touch    MEDICATIONS:  MEDICATIONS  (STANDING):  acetylcysteine 10%  Inhalation 3 milliLiter(s) Inhalation three times a day  albuterol/ipratropium for Nebulization. 3 milliLiter(s) Nebulizer three times a day  ascorbic acid 500 milliGRAM(s) Oral every 24 hours  aspirin  chewable 81 milliGRAM(s) Enteral Tube daily  atorvastatin 80 milliGRAM(s) Oral every 24 hours  cefTRIAXone   IVPB 1000 milliGRAM(s) IV Intermittent every 24 hours  chlorhexidine 2% Cloths 1 Application(s) Topical <User Schedule>  dextrose 5%. 1000 milliLiter(s) (50 mL/Hr) IV Continuous <Continuous>  dextrose 50% Injectable 12.5 Gram(s) IV Push once  dextrose 50% Injectable 25 Gram(s) IV Push once  dextrose 50% Injectable 25 Gram(s) IV Push once  enoxaparin Injectable 30 milliGRAM(s) SubCutaneous every 24 hours  insulin lispro (HumaLOG) corrective regimen sliding scale   SubCutaneous every 6 hours  metoprolol tartrate 12.5 milliGRAM(s) Oral every 12 hours  multivitamin 1 Tablet(s) Oral every 24 hours  sodium phosphate IVPB 15 milliMole(s) IV Intermittent once  thiamine IVPB 500 milliGRAM(s) IV Intermittent every 24 hours    MEDICATIONS  (PRN):  dextrose 40% Gel 15 Gram(s) Oral once PRN Blood Glucose LESS THAN 70 milliGRAM(s)/deciliter  glucagon  Injectable 1 milliGRAM(s) IntraMuscular once PRN Glucose LESS THAN 70 milligrams/deciliter      ALLERGIES:  Allergies    No Known Allergies    Intolerances        LABS:                        13.3   14.43 )-----------( 107      ( 21 Mar 2020 06:04 )             39.6     03-21    145  |  110<H>  |  36<H>  ----------------------------<  180<H>  4.8   |  25  |  0.47<L>    Ca    9.3      21 Mar 2020 06:04  Phos  2.2     03-21  Mg     2.2     03-21    TPro  5.2<L>  /  Alb  2.5<L>  /  TBili  0.5  /  DBili  x   /  AST  32  /  ALT  15  /  AlkPhos  47  03-20        CAPILLARY BLOOD GLUCOSE      POCT Blood Glucose.: 147 mg/dL (21 Mar 2020 06:09)      RADIOLOGY & ADDITIONAL TESTS: Reviewed. OVERNIGHT EVENTS: Pt with low urine output overnight. 12 am EKG showing QTc of 521 and new ST-elevation in V2-V3    SUBJECTIVE / INTERVAL HPI: Patient seen and examined at bedside. Shook her head no to pain, dyspnea, chest pressure or pain.     VITAL SIGNS:  Vital Signs Last 24 Hrs  T(C): 36.8 (21 Mar 2020 06:07), Max: 37.3 (20 Mar 2020 10:00)  T(F): 98.2 (21 Mar 2020 06:07), Max: 99.1 (20 Mar 2020 10:00)  HR: 89 (21 Mar 2020 07:00) (58 - 89)  BP: 97/55 (21 Mar 2020 07:00) (86/50 - 116/59)  BP(mean): 69 (21 Mar 2020 07:00) (61 - 91)  RR: 18 (21 Mar 2020 07:00) (13 - 36)  SpO2: 94% (21 Mar 2020 07:00) (91% - 99%)    PHYSICAL EXAM:  General: Cachetic woman in NAD  HEENT: NC/AT; PERRL, anicteric sclera; dry MM  Neck: supple  Cardiovascular: +S1/S2, RRR  Respiratory: CTA B/L from anterior chest; no W/R/R  Gastrointestinal: soft, NT/ND; +BS  Extremities: WWP; no edema present  Vascular: 2+ radial pulses B/L  Neurological: Alert; answering questions intermittently; able to follow commands    MEDICATIONS:  MEDICATIONS  (STANDING):  acetylcysteine 10%  Inhalation 3 milliLiter(s) Inhalation three times a day  albuterol/ipratropium for Nebulization. 3 milliLiter(s) Nebulizer three times a day  ascorbic acid 500 milliGRAM(s) Oral every 24 hours  aspirin  chewable 81 milliGRAM(s) Enteral Tube daily  atorvastatin 80 milliGRAM(s) Oral every 24 hours  cefTRIAXone   IVPB 1000 milliGRAM(s) IV Intermittent every 24 hours  chlorhexidine 2% Cloths 1 Application(s) Topical <User Schedule>  dextrose 5%. 1000 milliLiter(s) (50 mL/Hr) IV Continuous <Continuous>  dextrose 50% Injectable 12.5 Gram(s) IV Push once  dextrose 50% Injectable 25 Gram(s) IV Push once  dextrose 50% Injectable 25 Gram(s) IV Push once  enoxaparin Injectable 30 milliGRAM(s) SubCutaneous every 24 hours  insulin lispro (HumaLOG) corrective regimen sliding scale   SubCutaneous every 6 hours  metoprolol tartrate 12.5 milliGRAM(s) Oral every 12 hours  multivitamin 1 Tablet(s) Oral every 24 hours  sodium phosphate IVPB 15 milliMole(s) IV Intermittent once  thiamine IVPB 500 milliGRAM(s) IV Intermittent every 24 hours    MEDICATIONS  (PRN):  dextrose 40% Gel 15 Gram(s) Oral once PRN Blood Glucose LESS THAN 70 milliGRAM(s)/deciliter  glucagon  Injectable 1 milliGRAM(s) IntraMuscular once PRN Glucose LESS THAN 70 milligrams/deciliter      ALLERGIES:  Allergies    No Known Allergies    Intolerances        LABS:                        13.3   14.43 )-----------( 107      ( 21 Mar 2020 06:04 )             39.6     03-21    145  |  110<H>  |  36<H>  ----------------------------<  180<H>  4.8   |  25  |  0.47<L>    Ca    9.3      21 Mar 2020 06:04  Phos  2.2     03-21  Mg     2.2     03-21    TPro  5.2<L>  /  Alb  2.5<L>  /  TBili  0.5  /  DBili  x   /  AST  32  /  ALT  15  /  AlkPhos  47  03-20        CAPILLARY BLOOD GLUCOSE      POCT Blood Glucose.: 147 mg/dL (21 Mar 2020 06:09)      RADIOLOGY & ADDITIONAL TESTS: Reviewed. TRANSFER NOTE:  79F unknown PMH brought in by EMS w/ ST-elevations in lateral leads on EKG. Pt was not able to provide hx, and no other hx provided. Cath showed LV EF=15-20%, severe apical ballooning, akinesis suggestive of Takutsobo Cardiomyopathy. No stents placed and pt had nonobstructive CAD (LAD=70% diffuse stenosis, Lcx=30-40% stenosis, RCA=large One vessel coronary artery disease, Frailty score=7, RCA=mild luminal irregularities). She was loaded with aspirin rectally, 500 cc NS given as pt dry on exam (tachycardic, minimal urine on bladder scan). Pt dessatted to 91% on RA and was placed on 4L NC, when she became tachypneic with ABG showing respiratory alkalosis, so she was put on HFNC. Pt not answering questions on exam throughout this admission, though she does follow commands--with CTH not showing any acute pathology. Initially during admission, pt with hypernatremia which improved s/p D5W and free-water flushes. Pt remained encephalopathic after resolution of hypernatremia. Encephalopathy most likely behavioral (recent hx of APS case involvement, reported to unkept at home). She failed bedside S+S. Speech and swallow were consulted with pt not having adequate swallowing. NGT placed with b/l wrist restraints as pt pulled out NGT x1. During admission, pt still with high O2 requirements on HFNC for which CTPE was performed. PE was ruled out, but LLL collapse was seen with concern for post-obstructive PNA. Pulm was consulted with aggressive pulmonary toilet implemented and ceftriaxone 1g qd was started.    OVERNIGHT EVENTS: Pt with low urine output overnight. 12 am EKG showing QTc of 521 and new ST-elevation in V2-V3    SUBJECTIVE / INTERVAL HPI: Patient seen and examined at bedside. Shook her head no to pain, dyspnea, chest pressure or pain.     VITAL SIGNS:  Vital Signs Last 24 Hrs  T(C): 36.8 (21 Mar 2020 06:07), Max: 37.3 (20 Mar 2020 10:00)  T(F): 98.2 (21 Mar 2020 06:07), Max: 99.1 (20 Mar 2020 10:00)  HR: 89 (21 Mar 2020 07:00) (58 - 89)  BP: 97/55 (21 Mar 2020 07:00) (86/50 - 116/59)  BP(mean): 69 (21 Mar 2020 07:00) (61 - 91)  RR: 18 (21 Mar 2020 07:00) (13 - 36)  SpO2: 94% (21 Mar 2020 07:00) (91% - 99%)    PHYSICAL EXAM:  General: Cachetic woman in NAD  HEENT: NC/AT; PERRL, anicteric sclera; dry MM  Neck: supple  Cardiovascular: +S1/S2, RRR  Respiratory: CTA B/L from anterior chest; no W/R/R  Gastrointestinal: soft, NT/ND; +BS  Extremities: WWP; no edema present  Vascular: 2+ radial pulses B/L  Neurological: Alert; answering questions intermittently; able to follow commands    MEDICATIONS:  MEDICATIONS  (STANDING):  acetylcysteine 10%  Inhalation 3 milliLiter(s) Inhalation three times a day  albuterol/ipratropium for Nebulization. 3 milliLiter(s) Nebulizer three times a day  ascorbic acid 500 milliGRAM(s) Oral every 24 hours  aspirin  chewable 81 milliGRAM(s) Enteral Tube daily  atorvastatin 80 milliGRAM(s) Oral every 24 hours  cefTRIAXone   IVPB 1000 milliGRAM(s) IV Intermittent every 24 hours  chlorhexidine 2% Cloths 1 Application(s) Topical <User Schedule>  dextrose 5%. 1000 milliLiter(s) (50 mL/Hr) IV Continuous <Continuous>  dextrose 50% Injectable 12.5 Gram(s) IV Push once  dextrose 50% Injectable 25 Gram(s) IV Push once  dextrose 50% Injectable 25 Gram(s) IV Push once  enoxaparin Injectable 30 milliGRAM(s) SubCutaneous every 24 hours  insulin lispro (HumaLOG) corrective regimen sliding scale   SubCutaneous every 6 hours  metoprolol tartrate 12.5 milliGRAM(s) Oral every 12 hours  multivitamin 1 Tablet(s) Oral every 24 hours  sodium phosphate IVPB 15 milliMole(s) IV Intermittent once  thiamine IVPB 500 milliGRAM(s) IV Intermittent every 24 hours    MEDICATIONS  (PRN):  dextrose 40% Gel 15 Gram(s) Oral once PRN Blood Glucose LESS THAN 70 milliGRAM(s)/deciliter  glucagon  Injectable 1 milliGRAM(s) IntraMuscular once PRN Glucose LESS THAN 70 milligrams/deciliter      ALLERGIES:  Allergies    No Known Allergies    Intolerances        LABS:                        13.3   14.43 )-----------( 107      ( 21 Mar 2020 06:04 )             39.6     03-21    145  |  110<H>  |  36<H>  ----------------------------<  180<H>  4.8   |  25  |  0.47<L>    Ca    9.3      21 Mar 2020 06:04  Phos  2.2     03-21  Mg     2.2     03-21    TPro  5.2<L>  /  Alb  2.5<L>  /  TBili  0.5  /  DBili  x   /  AST  32  /  ALT  15  /  AlkPhos  47  03-20        CAPILLARY BLOOD GLUCOSE      POCT Blood Glucose.: 147 mg/dL (21 Mar 2020 06:09)      RADIOLOGY & ADDITIONAL TESTS: Reviewed. TRANSFER NOTE:  79F with unknown PMH brought in by EMS w/ ST-elevations in lateral leads on EKG. Pt was not able to provide hx, and no other hx was provided. She was loaded with ASA rectally with cath showing LV EF=15-20%, severe apical ballooning, akinesis suggestive of Takutsobo Cardiomyopathy. No stents placed as pt had nonobstructive CAD (LAD=70% diffuse stenosis, Lcx=30-40% stenosis, RCA=large One vessel coronary artery disease, Frailty score=7, RCA=mild luminal irregularities). In CCU, became tachypneic with ABG showing respiratory alkalosis, so she was put on HFNC. After cath, pt noted to have neck stiffness on exam and was started on CTX 2g and vanc 500mg for empiric coverage of meningitis, which was d/c due to low suspicion. Pt not verbally answering questions on exam throughout this admission, though she does follow commands and respond with nods/shaking head no. CTH not showing any acute pathology. Initially during admission, pt with hypernatremia which improved s/p D5W and free-water flushes. Pt remained encephalopathic after resolution of hypernatremia. Encephalopathy most likely behavioral (recent hx of APS case involvement, reported to unkept at home). She also failed bedside S+S. Speech and swallow were consulted with pt not having adequate swallowing. NGT placed with b/l wrist restraints as pt pulled out NGT x1. Nutrition was consulted for FTT and starvation ketosis with her being started on feeds with careful repletion of electrolytes. She is still with high O2 requirements on HFNC for which CTPE was performed. PE was ruled out, but LLL collapse was seen with concern for post-obstructive PNA. Pulm was consulted with aggressive pulmonary toilet implemented and ceftriaxone 1g qd started.    OVERNIGHT EVENTS: Pt with low urine output overnight. 12 am EKG showing QTc of 521 and new ST-elevation in V2-V3    SUBJECTIVE / INTERVAL HPI: Patient seen and examined at bedside. Shook her head no to pain, dyspnea, chest pressure or pain.     VITAL SIGNS:  Vital Signs Last 24 Hrs  T(C): 36.8 (21 Mar 2020 06:07), Max: 37.3 (20 Mar 2020 10:00)  T(F): 98.2 (21 Mar 2020 06:07), Max: 99.1 (20 Mar 2020 10:00)  HR: 89 (21 Mar 2020 07:00) (58 - 89)  BP: 97/55 (21 Mar 2020 07:00) (86/50 - 116/59)  BP(mean): 69 (21 Mar 2020 07:00) (61 - 91)  RR: 18 (21 Mar 2020 07:00) (13 - 36)  SpO2: 94% (21 Mar 2020 07:00) (91% - 99%)    PHYSICAL EXAM:  General: Cachetic woman in NAD  HEENT: NC/AT; PERRL, anicteric sclera; dry MM  Neck: supple  Cardiovascular: +S1/S2, RRR  Respiratory: CTA B/L from anterior chest; no W/R/R  Gastrointestinal: soft, NT/ND; +BS  Extremities: WWP; no edema present  Vascular: 2+ radial pulses B/L  Neurological: Alert; answering questions intermittently; able to follow commands    MEDICATIONS:  MEDICATIONS  (STANDING):  acetylcysteine 10%  Inhalation 3 milliLiter(s) Inhalation three times a day  albuterol/ipratropium for Nebulization. 3 milliLiter(s) Nebulizer three times a day  ascorbic acid 500 milliGRAM(s) Oral every 24 hours  aspirin  chewable 81 milliGRAM(s) Enteral Tube daily  atorvastatin 80 milliGRAM(s) Oral every 24 hours  cefTRIAXone   IVPB 1000 milliGRAM(s) IV Intermittent every 24 hours  chlorhexidine 2% Cloths 1 Application(s) Topical <User Schedule>  dextrose 5%. 1000 milliLiter(s) (50 mL/Hr) IV Continuous <Continuous>  dextrose 50% Injectable 12.5 Gram(s) IV Push once  dextrose 50% Injectable 25 Gram(s) IV Push once  dextrose 50% Injectable 25 Gram(s) IV Push once  enoxaparin Injectable 30 milliGRAM(s) SubCutaneous every 24 hours  insulin lispro (HumaLOG) corrective regimen sliding scale   SubCutaneous every 6 hours  metoprolol tartrate 12.5 milliGRAM(s) Oral every 12 hours  multivitamin 1 Tablet(s) Oral every 24 hours  sodium phosphate IVPB 15 milliMole(s) IV Intermittent once  thiamine IVPB 500 milliGRAM(s) IV Intermittent every 24 hours    MEDICATIONS  (PRN):  dextrose 40% Gel 15 Gram(s) Oral once PRN Blood Glucose LESS THAN 70 milliGRAM(s)/deciliter  glucagon  Injectable 1 milliGRAM(s) IntraMuscular once PRN Glucose LESS THAN 70 milligrams/deciliter      ALLERGIES:  Allergies    No Known Allergies    Intolerances        LABS:                        13.3   14.43 )-----------( 107      ( 21 Mar 2020 06:04 )             39.6     03-21    145  |  110<H>  |  36<H>  ----------------------------<  180<H>  4.8   |  25  |  0.47<L>    Ca    9.3      21 Mar 2020 06:04  Phos  2.2     03-21  Mg     2.2     03-21    TPro  5.2<L>  /  Alb  2.5<L>  /  TBili  0.5  /  DBili  x   /  AST  32  /  ALT  15  /  AlkPhos  47  03-20        CAPILLARY BLOOD GLUCOSE      POCT Blood Glucose.: 147 mg/dL (21 Mar 2020 06:09)      RADIOLOGY & ADDITIONAL TESTS: Reviewed. TRANSFER NOTE:  79F with unknown PMH brought in by EMS w/ ST-elevations in lateral leads on EKG. Pt was not able to provide hx, and no other hx was provided. She was loaded with ASA rectally with cath showing LV EF=15-20%, severe apical ballooning, akinesis suggestive of Takutsobo Cardiomyopathy. No stents placed as pt had nonobstructive CAD (LAD=70% diffuse stenosis, Lcx=30-40% stenosis, RCA=large One vessel coronary artery disease, Frailty score=7, RCA=mild luminal irregularities). In CCU, became tachypneic with ABG showing respiratory alkalosis, so she was put on HFNC. After cath, pt noted to have neck stiffness on exam and was started on CTX 2g and vanc 500mg for empiric coverage of meningitis, which was d/c due to low suspicion. Pt not verbally answering questions on exam throughout this admission, though she does follow commands and respond with nods/shaking head no. CTH not showing any acute pathology. Initially during admission, pt with hypernatremia which improved s/p D5W and free-water flushes. Pt remained encephalopathic after resolution of hypernatremia. Encephalopathy most likely behavioral (recent hx of APS case involvement, reported to unkept at home). She also failed bedside S+S. Speech and swallow were consulted with pt not having adequate swallowing. NGT placed with b/l wrist restraints as pt pulled out NGT x1. Nutrition was consulted for FTT and starvation ketosis with her being started on feeds with careful repletion of electrolytes. She is still with high O2 requirements on HFNC for which CTPE was performed. PE was ruled out, but LLL collapse was seen likely 2/2 mucus plugging with likely post-obstructive PNA. Pulm was consulted with aggressive pulmonary toilet implemented and ceftriaxone 1g qd started.    OVERNIGHT EVENTS: Pt with low urine output overnight. 12 am EKG showing QTc of 521 and new ST-elevation in V2-V3    SUBJECTIVE / INTERVAL HPI: Patient seen and examined at bedside. Shook her head no to pain, dyspnea, chest pressure or pain.     VITAL SIGNS:  Vital Signs Last 24 Hrs  T(C): 36.8 (21 Mar 2020 06:07), Max: 37.3 (20 Mar 2020 10:00)  T(F): 98.2 (21 Mar 2020 06:07), Max: 99.1 (20 Mar 2020 10:00)  HR: 89 (21 Mar 2020 07:00) (58 - 89)  BP: 97/55 (21 Mar 2020 07:00) (86/50 - 116/59)  BP(mean): 69 (21 Mar 2020 07:00) (61 - 91)  RR: 18 (21 Mar 2020 07:00) (13 - 36)  SpO2: 94% (21 Mar 2020 07:00) (91% - 99%)    PHYSICAL EXAM:  General: Cachetic woman in NAD  HEENT: NC/AT; PERRL, anicteric sclera; dry MM  Neck: supple  Cardiovascular: +S1/S2, RRR  Respiratory: CTA B/L from anterior chest; no W/R/R  Gastrointestinal: soft, NT/ND; +BS  Extremities: WWP; no edema present  Vascular: 2+ radial pulses B/L  Neurological: Alert; answering questions intermittently; able to follow commands    MEDICATIONS:  MEDICATIONS  (STANDING):  acetylcysteine 10%  Inhalation 3 milliLiter(s) Inhalation three times a day  albuterol/ipratropium for Nebulization. 3 milliLiter(s) Nebulizer three times a day  ascorbic acid 500 milliGRAM(s) Oral every 24 hours  aspirin  chewable 81 milliGRAM(s) Enteral Tube daily  atorvastatin 80 milliGRAM(s) Oral every 24 hours  cefTRIAXone   IVPB 1000 milliGRAM(s) IV Intermittent every 24 hours  chlorhexidine 2% Cloths 1 Application(s) Topical <User Schedule>  dextrose 5%. 1000 milliLiter(s) (50 mL/Hr) IV Continuous <Continuous>  dextrose 50% Injectable 12.5 Gram(s) IV Push once  dextrose 50% Injectable 25 Gram(s) IV Push once  dextrose 50% Injectable 25 Gram(s) IV Push once  enoxaparin Injectable 30 milliGRAM(s) SubCutaneous every 24 hours  insulin lispro (HumaLOG) corrective regimen sliding scale   SubCutaneous every 6 hours  metoprolol tartrate 12.5 milliGRAM(s) Oral every 12 hours  multivitamin 1 Tablet(s) Oral every 24 hours  sodium phosphate IVPB 15 milliMole(s) IV Intermittent once  thiamine IVPB 500 milliGRAM(s) IV Intermittent every 24 hours    MEDICATIONS  (PRN):  dextrose 40% Gel 15 Gram(s) Oral once PRN Blood Glucose LESS THAN 70 milliGRAM(s)/deciliter  glucagon  Injectable 1 milliGRAM(s) IntraMuscular once PRN Glucose LESS THAN 70 milligrams/deciliter      ALLERGIES:  Allergies    No Known Allergies    Intolerances        LABS:                        13.3   14.43 )-----------( 107      ( 21 Mar 2020 06:04 )             39.6     03-21    145  |  110<H>  |  36<H>  ----------------------------<  180<H>  4.8   |  25  |  0.47<L>    Ca    9.3      21 Mar 2020 06:04  Phos  2.2     03-21  Mg     2.2     03-21    TPro  5.2<L>  /  Alb  2.5<L>  /  TBili  0.5  /  DBili  x   /  AST  32  /  ALT  15  /  AlkPhos  47  03-20        CAPILLARY BLOOD GLUCOSE      POCT Blood Glucose.: 147 mg/dL (21 Mar 2020 06:09)      RADIOLOGY & ADDITIONAL TESTS: Reviewed.

## 2020-03-21 NOTE — PROGRESS NOTE ADULT - PROBLEM SELECTOR PLAN 3
Pt presenting w/ no provided hx by EMS, and not answering questions on exam. Does follow commands. Most likely 2/2 behavioral (recent hx of APS case involvement, reported to unkept at home). Other etiologies that are less likely include wernicke/korsakoff (no nystagmus on exam) vs underlying dementia (CTH=microvascular ischemic changes + parenchymal volume loss) vs infection (PNA) vs stroke (no focal deficits, 5/5 muscle strength).  Pt more awake and alert today, still not speaking but follows commands     -appreciate dietician and SW recs   -bcx NGTD, lactate cleared  -c/w thiamine for possible wernicke  -will monitor how pt does, if not improved, would consider MRI

## 2020-03-21 NOTE — PROGRESS NOTE ADULT - ASSESSMENT
78 yo Female unknown PMHx brought in by EMS w/ STEMI s/p cardiac cath found to have LV EF=15-20%, severe apical ballooning, akinesis suggestive of Takutsobo Cardiomyopathy (no stents placed). Now w/ improving metabolic encepholopathy, hypernatremia (resolved), AG Metabolic acidosis 2/2 starvation ketosis (resolved), and failure to thrive. CT chest performed with PE ruled out, however showing LLL lung collapse with mucus plugging and likely post-obstructive PNA.     3/18/2020 s/p cardiac cath: LVEDP=17mmhg+ normal EDP pre and post angio, anterior and apical akinesis. LAD=70% diffuse stenosis, Lcx=30-40% stenosis, RCA=large One vessel coronary artery disease, Frailty score=7, RCA=mild luminal irregularities, diagnol artery 80% stenosis. No stents placed. 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 STEMI protocol initiated. s/p cardiac cath showing LV EF=15-20%, severe apical ballooning, akinesis suggestive of Takutsobo Cardiomyopathy. No stents placed.  Pt unable to provide any history. Takutsobo most likely 2/2 AG metabolic acidosis 2/2 Starvation ketosis vs stress/emotional vs infection. pt is dry to euvolemic on exam today. EKG today showing ST-elevation in V2-V3 and evolving ischemic changes in anterolateral leads - 2/2 Takusubo cardiomyopathy. Ischemic etiology unlikely as pt's cath did not show obstructive CAD  PLAN:  -NGT in place: c/w aspirin 81mg and lipitor 80mg qd  -c/w PO lopressor 12.5 mg q12 as beneficial for takutsobo w/ increased pre-load capability and reduction of tachycardic episodes to allow proper filling of ventricles  -3/20 echo: Aortic valve is calcified, no aortic regurgitation, mitral valve is moderately thickened. Systolic anterior motion of the mitral valve resulting in an LVOT gradient of 62.00 mmHg and severe LVOT obstruction. Moderate mitral regurgitation. Hypertrophy of the basal septum. Left ventricular ejection fraction is 21%. Normal basal wall motion. Rest of the segments are severely hypokinetic to akinetic. Consider stress induced cardiomyopathy.     NEURO/PSYCH:  Encephalopathy: Pt presenting w/ no provided hx by EMS, and not answering questions on exam. Does follow commands. Most likely 2/2 behavioral (recent hx of APS case involvement, reported to unkept at home). Other etiologies that are less likely include wernicke/korsakoff (no nystagmus on exam) vs underlying dementia (CTH=microvascular ischemic changes + parenchymal volume loss) vs infection (PNA) vs stroke (no focal deficits, 5/5 muscle strength).  Pt more awake and alert today, still not speaking but follows commands     -appreciate dietician and SW recs   -bcx NGTD, lactate cleared  -c/w thiamine for possible wernicke  -will monitor how pt does, if not improved, would consider MRI    #dysphagia  PLAN:  unclear as to why pt can't initiate swallowing reflex, most likely 2/2 encephalopathy  -S+S - similar to prior exam, will continue to monitor  -c/w NGT  -pt on wrist restraints as pulled of mittens and NGT.     #Failure to thrive in adult  PLAN:  Pt cachectic, underweight, anorexic BMI=15.8, multiple pressure ulcers on body. Most likely has not been taken care of. No recorded home address or family contacts. Follows commands but does not speak nor swallow.    -consulted SW, dietician, and S+S  -NGT in place  -c/w jevity tube feeds at a slow rate per dietician, to avoid refeeding syndrome - currently rate at 30  -monitor electrolytes and replete PRN    PULMONARY:  #Respiratory failure with hypoxia 2/2 LLL collapse and likely post-obstructive PNA  Pt dessatted to 91% on RA, put on 4L NC, became tachypneic, ABG showed respiratory alkalosis, put on HFNC (Today, pt remains on HFNC at 50/50). Initially was concerned for volume overload vs. PE. CTPE however did not show PE, but did show LLL collapse with mucus plugging and likely post-obstructive PNA.  -pulm consulted, appreciate recs including chest PT, mucomyst, aspiration precautions, frequent suctioning, and aggressive pulmonary hygiene  -started ceftriaxone 1g qd (3/21) for 5 days  -will ween off of HFNC as tolerated  -NGT and aspiration precautions in place  -holding off on azithromycin as pt with prolonged QTc in setting of cardiomyopathy  -f/u urine legionella    #LLL collapse  -pulm consulted, appreciate recs including chest PT, mucomyst, aspiration precautions, frequent suctioning, and aggressive pulmonary hygiene  -will ween off of HFNC as tolerated    #PNA, likely post-obstructive from mucus plug vs. endobronchial lesion (less likely)  Repeat CTPE study showing "near complete consolidation collapse left lower lobe. This is likely post-obstructive PNA, though per report an endobronchial lesion cannot be ruled out." Likely cause of leukocytosis and acute hypoxemic respiratory failure  -pulm consulted, will attempt to clear mucus plug with chest PT, mucomyst, aspiration precautions, frequent suctioning, and aggressive pulmonary hygiene  -c/w ceftriaxone  -f/u urine legionella - if positive, will start doxycycline  -will hold off on azithromycin due to prolonged QTc and low suspicion for atypical cause of PNA  -while we cannot r/o endobronchial mass, it is less likely. Pt is also poor candidate for invasive w/u  -will send sputum cytology if she produces sputum    RENAL/ELECTROLYTES:  #Metabolic acidosis, increased anion gap (IAG): Pt w/ HCO3=18, AG=24, elevated beta hydroxybutyrate 5.1, most likely 2/2 starvation ketosis 2/2 FTT. S/p D5  -NOW RESOLVED.     #Hypernatremia: Initial Ym=808, which uptrended to 150, and started on D5. Most likely 2/2 dehydration/FTT. S/p 500 cc NS. s/p D5 for hypernatremia/nutrition  -NOW RESOLVED.     ENDOCRINE:  #Thyroid dysfunction: Pt w/ large thyroid goiter found on CT chest. Asymmetrical multinodular goiter seen on CTPE. Low TSH=.13, and low T3=56. May be sick thyroid in setting of active medical problems.   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     Prophylactic measures:  F: none  E: Replete K and mag  N: NGT feeds - jevity  A: lovenox 40mg    Transition of care performed with sharing of clinical summary:  PCP Contacted on Admission: (Y/N) --> Name & Phone #:  2) Date of Contact with PCP: TBD  3) PCP Contacted at Discharge: TBD  4) Summary of Handoff Given to PCP: TBD  5) Post-Discharge Appointment Date: TBD 80 yo Female unknown PMHx brought in by EMS w/ STEMI (in all leads) and STEMI code was activated. Pt s/p diagnostic cardiac cath 03/18/2020 with Dr Chucky Castanon showing pLAD 70%, pD1 80-90% but no plan for intervention. The patient has had hypoxemic respiratory failure since admission and initial CT Chest on 3/18/2020 showed evidence of mucus plugging and small infiltrate in LLL. Pulmonary consulted and she was started on Ceftriaxone for pneumonia. While getting from bed to chair the patient had an episode of desaturation 03/19/2020 and thus was sent to CT to which ruled out PE but showed essentially total LLL collapse likely from mucus plugging. Pt started on HFNC. Now w/ improving metabolic encephalopathy, hypernatremia (resolved), AG Metabolic acidosis 2/2 starvation ketosis (resolved), and failure to thrive. Pt hemodynamically stable. Pt has NGT as pt has lost swallow reflex. Speech and swallow will reevaluate on Monday. Pt has nondestructive, nonviolent soft wrist restraints b/l as pt pulled her initial NGT.  Pt has jaime catheter for strict Is and Os. Pt has step daughter who does not want to be involved in the care of the patient. Pt is being stepped down to [facility] for further management including ethics consult on Monday to determine further course of action.

## 2020-03-21 NOTE — PROGRESS NOTE ADULT - PROBLEM SELECTOR PLAN 6
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

## 2020-03-21 NOTE — PROGRESS NOTE ADULT - PROBLEM SELECTOR PLAN 1
ST elevation throughout all leads. STEMI protocol initiated. Pt s/p diagnostic cardiac cath 03/18/2020 with Dr Chucky Castanon showing pLAD 70%, mLAd 60-70%, pD1 80-90%. pLCX 30-40. Mild luminal irregularities in OM1 and RCA. EF 15-20%, EDP 17. Severe apical ballooning, akinesis suggestive of Takutsobo Cardiomyopathy. RHC PA 21/10 PCWP 14. PA sat 67%. AoSat 96%. CO 2.9. CI 2.16. Right radial and right groin access (manual arterial and venous removed in CCU). Pt unable to provide any history. Takutsobo most likely 2/2 AG metabolic acidosis 2/2 Starvation ketosis vs stress/emotional vs infection. pt is dry to euvolemic on exam 03/21 as per CCU resident. EKG 03/21/2020showing ST-elevation in V2-V3 and evolving ischemic changes in anterolateral leads - 2/2 Takusubo cardiomyopathy.   PLAN:  -NGT in place: c/w aspirin 81mg daily and lipitor 80mg daily  -c/w PO lopressor 12.5 mg q12 as beneficial for takutsobo w/ increased pre-load capability and reduction of tachycardic episodes to allow proper filling of ventricles  -3/20/2020 echo: Aortic valve is calcified, no aortic regurgitation, mitral valve is moderately thickened. Systolic anterior motion of the mitral valve resulting in an LVOT gradient of 62.00 mmHg and severe LVOT obstruction. Moderate mitral regurgitation. Hypertrophy of the basal septum. Left ventricular ejection fraction is 21%. Normal basal wall motion. Rest of the segments are severely hypokinetic to akinetic. Consider stress induced cardiomyopathy.

## 2020-03-21 NOTE — PROGRESS NOTE ADULT - ASSESSMENT
INCOMPLETE  79F w/ unknown PMH brought in by EMS w/ STEMI s/p cardiac cath found to have LV EF=15-20%, severe apical ballooning, akinesis suggestive of Takutsobo Cardiomyopathy (no stents placed). Now w/ improving metabolic encepholopathy, hypernatremia (resolved), AG Metabolic acidosis 2/2 starvation ketosis (resolved), and failure to thrive.     3/2020 cardiac cath: LVEDP=17mmhg+ normal EDP pre and post angio, anterior and apical akinesis. LAD=70% diffuse stenosis, Lcx=30-40% stenosis, RCA=large One vessel coronary artery disease, Frailty score=7, RCA=mild luminal irregularities, diagnol artery 80% stenosis. No stents placed. 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 STEMI protocol initiated. s/p cardiac cath showing LV EF=15-20%, severe apical ballooning, akinesis suggestive of Takutsobo Cardiomyopathy. No stents placed.  Pt unable to provide any history. Takutsobo most likely 2/2 AG metabolic acidosis 2/2 Starvation ketosis vs SIRS vs stress/emotional vs infection (unlikely-afebrile, improving clinical exam, u/a and CXR clear). s/p 500cc NS bolus for dehydration, Euvolemic on exam today, -255cc urine output/24 hours  -radial band removed, +2 radial pulse w/ no signs of bleeding  -swan cath removed  -ekg w/ diffuse ST elevation in all leads, slightly improved from prior  -NGT in place: c/w aspirin 81mg and lipitor 80mg qd   -c/w PO lopressor 12.5 mg q12 as beneficial for takutsobo w/ increased pre-load capabality and reduction of tachycardic episodes to allow proper filling of ventricles  -3/20 echo: Aortic valve is calcified, no aortic regurgitation, mitral valve is moderately thickened. Systolic anterior motion of the mitral valve resulting in an LVOT gradient of 62.00 mmHg and severe LVOT obstruction. Moderate mitral regurgitation. Hypertrophy of the basal septum. Left ventricular ejection fraction is 21%. Normal basal wall motion. Rest of the segments are severely hypokinetic to akinetic. Consider stress induced cardiomyopathy.     NEURO/PSYCH:  Encephalopathy: Pt presenting w/ no provided hx by EMS, and not answering questions on exam. Does follow commands. Most likely 2/2 behavioral (recent hx of APS case involvement, reported to unkept at home) vs wernicke/korsakoff (no nystagmus on exam) vs underlying dementia (CTH=microvascular ischemic changes + parenchymal volume loss) vs infection (less likely as afebrile, RVP neg, CXR clear/no cough, u/a clear of infection, improving mental function) vs stroke (no focal deficits, 5/5 muscle strength) vs toxins (utox neg).  Pt more awake and alert today, still not speaking but follows commands     -appreciate dietician and SW recs   -bcx NGTD, lactate cleared  -c/w thiamine for possible wernicke  -will monitor how pt does, if not improved, would consider MRI    #dysphagia: unclear as why pt cant initiate swalling reflex, most likely 2/2 encepholopathy. S+S recommended NGT  -S+S will reasses   -c/w NGT  -pt on wrist restraints as pulled of mittens and NGT.     Failure to thrive in adult: Pt cachectic, underweight, anorexic BMI=15.8, multiple pressure ulcers on body. Most likely has not been taken care of. No recorded home address or family contacts. Follows commands but does not speak nor swallow.    -consulted SW, dietician, and S+S  -underweight per nutritionist  -NGT in place  -c/w jevity tube feeds at a slow rate per dietician, to avoid refeeding syndrome.  -monitor electrolytes and replete PRN    PULMONARY  #Respiratory failure with hypoxia.  Plan: pt dessatted to 91% on RA, put on 4L NC, became tachypneic, ABG showed respiratory alkalosis, put on HFNC. Most likely 2/2 to 500cc NS bolus she received for dehydration (w/ EF=20%). Less likely infectious (CT chest showed no consolidation or infiltrates, pt afebrile, bedside u/s w/o A lines) vs less likely aspiration PNA 2/2 tube feeds (L sided pleura effusion, downtrending wbc, no fever, no cough) vs less likely PE (WELLS=3, no leg erythema/swelling/non-tender, bedside u/s w/ no signs of RV strain)  -will ween of HFNC to goal NC  -will consider CT PE protocol to r/o PE if pt does not improve  -NGT and aspiration precautions in place.     RENAL:  #Metabolic acidosis, increased anion gap (IAG): Pt w/ HCO3=18, AG=24, elevated beta hydroxybutyrate 5.1, most likely 2/2 starvation ketosis 2/2 FTT. S/p D5  -NOW RESOLVED.     #Hypernatremia: Initial Yb=617, which uptrended to 150, and started on D5. Most likely 2/2 dehydration/FTT. S/p 500 cc NS. s/p D5 for hypernatremia/nutrition  -NOW RESOLVED.     ENDOCRINE  #Thyroid dysfunction: Pt w/ large thyroid goiter found on CT chest. Asymmetrical multinodular goiter seen on CTPE. Low TSH=.13, and low T3=56. May be sick thyroid in setting of active medical problems.   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     Prophylactic measures:  F: none  E: Replete K and mag  N: NGT feeds - jevity  A: lovenox 40mg    Transition of care performed with sharing of clinical summary:  PCP Contacted on Admission: (Y/N) --> Name & Phone #:  2) Date of Contact with PCP: TBD  3) PCP Contacted at Discharge: TBD  4) Summary of Handoff Given to PCP: TBD  5) Post-Discharge Appointment Date: TBD INCOMPLETE  79F w/ unknown PMH brought in by EMS w/ STEMI s/p cardiac cath found to have LV EF=15-20%, severe apical ballooning, akinesis suggestive of Takutsobo Cardiomyopathy (no stents placed). Now w/ improving metabolic encepholopathy, hypernatremia (resolved), AG Metabolic acidosis 2/2 starvation ketosis (resolved), and failure to thrive.     3/2020 cardiac cath: LVEDP=17mmhg+ normal EDP pre and post angio, anterior and apical akinesis. LAD=70% diffuse stenosis, Lcx=30-40% stenosis, RCA=large One vessel coronary artery disease, Frailty score=7, RCA=mild luminal irregularities, diagnol artery 80% stenosis. No stents placed. 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 STEMI protocol initiated. s/p cardiac cath showing LV EF=15-20%, severe apical ballooning, akinesis suggestive of Takutsobo Cardiomyopathy. No stents placed.  Pt unable to provide any history. Takutsobo most likely 2/2 AG metabolic acidosis 2/2 Starvation ketosis vs SIRS vs stress/emotional vs infection (unlikely-afebrile, improving clinical exam, u/a and CXR clear). s/p 500cc NS bolus for dehydration, Euvolemic on exam today, -255cc urine output/24 hours  -radial band removed, +2 radial pulse w/ no signs of bleeding  -swan cath removed  -ekg w/ diffuse ST elevation in all leads, slightly improved from prior  -NGT in place: c/w aspirin 81mg and lipitor 80mg qd   -c/w PO lopressor 12.5 mg q12 as beneficial for takutsobo w/ increased pre-load capabality and reduction of tachycardic episodes to allow proper filling of ventricles  -3/20 echo: Aortic valve is calcified, no aortic regurgitation, mitral valve is moderately thickened. Systolic anterior motion of the mitral valve resulting in an LVOT gradient of 62.00 mmHg and severe LVOT obstruction. Moderate mitral regurgitation. Hypertrophy of the basal septum. Left ventricular ejection fraction is 21%. Normal basal wall motion. Rest of the segments are severely hypokinetic to akinetic. Consider stress induced cardiomyopathy.     NEURO/PSYCH:  Encephalopathy: Pt presenting w/ no provided hx by EMS, and not answering questions on exam. Does follow commands. Most likely 2/2 behavioral (recent hx of APS case involvement, reported to unkept at home) vs wernicke/korsakoff (no nystagmus on exam) vs underlying dementia (CTH=microvascular ischemic changes + parenchymal volume loss) vs infection (less likely as afebrile, RVP neg, CXR clear/no cough, u/a clear of infection, improving mental function) vs stroke (no focal deficits, 5/5 muscle strength) vs toxins (utox neg).  Pt more awake and alert today, still not speaking but follows commands     -appreciate dietician and SW recs   -bcx NGTD, lactate cleared  -c/w thiamine for possible wernicke  -will monitor how pt does, if not improved, would consider MRI    #dysphagia: unclear as why pt cant initiate swalling reflex, most likely 2/2 encepholopathy. S+S recommended NGT  -S+S will reasses   -c/w NGT  -pt on wrist restraints as pulled of mittens and NGT.     Failure to thrive in adult: Pt cachectic, underweight, anorexic BMI=15.8, multiple pressure ulcers on body. Most likely has not been taken care of. No recorded home address or family contacts. Follows commands but does not speak nor swallow.    -consulted SW, dietician, and S+S  -underweight per nutritionist  -NGT in place  -c/w jevity tube feeds at a slow rate per dietician, to avoid refeeding syndrome.  -monitor electrolytes and replete PRN    PULMONARY:  #Respiratory failure with hypoxia.  Plan: pt dessatted to 91% on RA, put on 4L NC, became tachypneic, ABG showed respiratory alkalosis, put on HFNC. Most likely 2/2 to 500cc NS bolus she received for dehydration (w/ EF=20%). Less likely infectious (CT chest showed no consolidation or infiltrates, pt afebrile, bedside u/s w/o A lines) vs less likely aspiration PNA 2/2 tube feeds (L sided pleura effusion, downtrending wbc, no fever, no cough) vs less likely PE (WELLS=3, no leg erythema/swelling/non-tender, bedside u/s w/ no signs of RV strain)  -will ween of HFNC to goal NC  -will consider CT PE protocol to r/o PE if pt does not improve  -NGT and aspiration precautions in place.     RENAL:  #Metabolic acidosis, increased anion gap (IAG): Pt w/ HCO3=18, AG=24, elevated beta hydroxybutyrate 5.1, most likely 2/2 starvation ketosis 2/2 FTT. S/p D5  -NOW RESOLVED.     #Hypernatremia: Initial Kg=915, which uptrended to 150, and started on D5. Most likely 2/2 dehydration/FTT. S/p 500 cc NS. s/p D5 for hypernatremia/nutrition  -NOW RESOLVED.     ENDOCRINE:  #Thyroid dysfunction: Pt w/ large thyroid goiter found on CT chest. Asymmetrical multinodular goiter seen on CTPE. Low TSH=.13, and low T3=56. May be sick thyroid in setting of active medical problems.   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     Prophylactic measures:  F: none  E: Replete K and mag  N: NGT feeds - jevity  A: lovenox 40mg    Transition of care performed with sharing of clinical summary:  PCP Contacted on Admission: (Y/N) --> Name & Phone #:  2) Date of Contact with PCP: TBD  3) PCP Contacted at Discharge: TBD  4) Summary of Handoff Given to PCP: TBD  5) Post-Discharge Appointment Date: TBD INCOMPLETE PLAN  79F w/ unknown PMH brought in by EMS w/ STEMI s/p cardiac cath found to have LV EF=15-20%, severe apical ballooning, akinesis suggestive of Takutsobo Cardiomyopathy (no stents placed). Now w/ improving metabolic encepholopathy, hypernatremia (resolved), AG Metabolic acidosis 2/2 starvation ketosis (resolved), and failure to thrive.     3/2020 cardiac cath: LVEDP=17mmhg+ normal EDP pre and post angio, anterior and apical akinesis. LAD=70% diffuse stenosis, Lcx=30-40% stenosis, RCA=large One vessel coronary artery disease, Frailty score=7, RCA=mild luminal irregularities, diagnol artery 80% stenosis. No stents placed. 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 STEMI protocol initiated. s/p cardiac cath showing LV EF=15-20%, severe apical ballooning, akinesis suggestive of Takutsobo Cardiomyopathy. No stents placed.  Pt unable to provide any history. Takutsobo most likely 2/2 AG metabolic acidosis 2/2 Starvation ketosis vs SIRS vs stress/emotional vs infection (unlikely-afebrile, improving clinical exam, u/a and CXR clear). s/p 500cc NS bolus for dehydration, Euvolemic on exam today, -255cc urine output/24 hours  -radial band removed, +2 radial pulse w/ no signs of bleeding  -swan cath removed  -ekg w/ diffuse ST elevation in all leads, slightly improved from prior  -NGT in place: c/w aspirin 81mg and lipitor 80mg qd   -c/w PO lopressor 12.5 mg q12 as beneficial for takutsobo w/ increased pre-load capabality and reduction of tachycardic episodes to allow proper filling of ventricles  -3/20 echo: Aortic valve is calcified, no aortic regurgitation, mitral valve is moderately thickened. Systolic anterior motion of the mitral valve resulting in an LVOT gradient of 62.00 mmHg and severe LVOT obstruction. Moderate mitral regurgitation. Hypertrophy of the basal septum. Left ventricular ejection fraction is 21%. Normal basal wall motion. Rest of the segments are severely hypokinetic to akinetic. Consider stress induced cardiomyopathy.     NEURO/PSYCH:  Encephalopathy: Pt presenting w/ no provided hx by EMS, and not answering questions on exam. Does follow commands. Most likely 2/2 behavioral (recent hx of APS case involvement, reported to unkept at home) vs wernicke/korsakoff (no nystagmus on exam) vs underlying dementia (CTH=microvascular ischemic changes + parenchymal volume loss) vs infection (less likely as afebrile, RVP neg, CXR clear/no cough, u/a clear of infection, improving mental function) vs stroke (no focal deficits, 5/5 muscle strength) vs toxins (utox neg).  Pt more awake and alert today, still not speaking but follows commands     -appreciate dietician and SW recs   -bcx NGTD, lactate cleared  -c/w thiamine for possible wernicke  -will monitor how pt does, if not improved, would consider MRI    #dysphagia: unclear as why pt cant initiate swalling reflex, most likely 2/2 encepholopathy. S+S recommended NGT  -S+S will reasses   -c/w NGT  -pt on wrist restraints as pulled of mittens and NGT.     Failure to thrive in adult: Pt cachectic, underweight, anorexic BMI=15.8, multiple pressure ulcers on body. Most likely has not been taken care of. No recorded home address or family contacts. Follows commands but does not speak nor swallow.    -consulted SW, dietician, and S+S  -underweight per nutritionist  -NGT in place  -c/w jevity tube feeds at a slow rate per dietician, to avoid refeeding syndrome.  -monitor electrolytes and replete PRN    PULMONARY:  #Respiratory failure with hypoxia.  Plan: pt dessatted to 91% on RA, put on 4L NC, became tachypneic, ABG showed respiratory alkalosis, put on HFNC. Most likely 2/2 to 500cc NS bolus she received for dehydration (w/ EF=20%). Less likely infectious (CT chest showed no consolidation or infiltrates, pt afebrile, bedside u/s w/o A lines) vs less likely aspiration PNA 2/2 tube feeds (L sided pleura effusion, downtrending wbc, no fever, no cough) vs less likely PE (WELLS=3, no leg erythema/swelling/non-tender, bedside u/s w/ no signs of RV strain)  -will ween of HFNC to goal NC  -will consider CT PE protocol to r/o PE if pt does not improve  -NGT and aspiration precautions in place.     RENAL:  #Metabolic acidosis, increased anion gap (IAG): Pt w/ HCO3=18, AG=24, elevated beta hydroxybutyrate 5.1, most likely 2/2 starvation ketosis 2/2 FTT. S/p D5  -NOW RESOLVED.     #Hypernatremia: Initial Bw=850, which uptrended to 150, and started on D5. Most likely 2/2 dehydration/FTT. S/p 500 cc NS. s/p D5 for hypernatremia/nutrition  -NOW RESOLVED.     ENDOCRINE:  #Thyroid dysfunction: Pt w/ large thyroid goiter found on CT chest. Asymmetrical multinodular goiter seen on CTPE. Low TSH=.13, and low T3=56. May be sick thyroid in setting of active medical problems.   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     Prophylactic measures:  F: none  E: Replete K and mag  N: NGT feeds - jevity  A: lovenox 40mg    Transition of care performed with sharing of clinical summary:  PCP Contacted on Admission: (Y/N) --> Name & Phone #:  2) Date of Contact with PCP: TBD  3) PCP Contacted at Discharge: TBD  4) Summary of Handoff Given to PCP: TBD  5) Post-Discharge Appointment Date: TBD INCOMPLETE PLAN  79F w/ unknown PMH brought in by EMS w/ STEMI s/p cardiac cath found to have LV EF=15-20%, severe apical ballooning, akinesis suggestive of Takutsobo Cardiomyopathy (no stents placed). Now w/ improving metabolic encepholopathy, hypernatremia (resolved), AG Metabolic acidosis 2/2 starvation ketosis (resolved), and failure to thrive.     3/2020 cardiac cath: LVEDP=17mmhg+ normal EDP pre and post angio, anterior and apical akinesis. LAD=70% diffuse stenosis, Lcx=30-40% stenosis, RCA=large One vessel coronary artery disease, Frailty score=7, RCA=mild luminal irregularities, diagnol artery 80% stenosis. No stents placed. 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 STEMI protocol initiated. s/p cardiac cath showing LV EF=15-20%, severe apical ballooning, akinesis suggestive of Takutsobo Cardiomyopathy. No stents placed.  Pt unable to provide any history. Takutsobo most likely 2/2 AG metabolic acidosis 2/2 Starvation ketosis vs SIRS vs stress/emotional vs infection (unlikely-afebrile, improving clinical exam, u/a and CXR clear). pt is dry to euvolemic on exam today. EKG today showing ST-elevation in V2-V3 and evolving ischemic changes in anterolateral leads - 2/2 Takusubo cardiomyopathy. Ischemic etiology unlikely as pt's cath did not show obstructive CAD  -NGT in place: c/w aspirin 81mg and lipitor 80mg qd   -c/w PO lopressor 12.5 mg q12 as beneficial for takutsobo w/ increased pre-load capability and reduction of tachycardic episodes to allow proper filling of ventricles  -3/20 echo: Aortic valve is calcified, no aortic regurgitation, mitral valve is moderately thickened. Systolic anterior motion of the mitral valve resulting in an LVOT gradient of 62.00 mmHg and severe LVOT obstruction. Moderate mitral regurgitation. Hypertrophy of the basal septum. Left ventricular ejection fraction is 21%. Normal basal wall motion. Rest of the segments are severely hypokinetic to akinetic. Consider stress induced cardiomyopathy.     NEURO/PSYCH:  Encephalopathy: Pt presenting w/ no provided hx by EMS, and not answering questions on exam. Does follow commands. Most likely 2/2 behavioral (recent hx of APS case involvement, reported to unkept at home) vs wernicke/korsakoff (no nystagmus on exam) vs underlying dementia (CTH=microvascular ischemic changes + parenchymal volume loss) vs infection (less likely as afebrile, RVP neg, CXR clear/no cough, u/a clear of infection, improving mental function) vs stroke (no focal deficits, 5/5 muscle strength) vs toxins (utox neg).  Pt more awake and alert today, still not speaking but follows commands     -appreciate dietician and SW recs   -bcx NGTD, lactate cleared  -c/w thiamine for possible wernicke  -will monitor how pt does, if not improved, would consider MRI    #dysphagia: unclear as why pt cant initiate swalling reflex, most likely 2/2 encepholopathy. S+S recommended NGT  -S+S - similar to prior exam, will continue to monitor  -c/w NGT  -pt on wrist restraints as pulled of mittens and NGT.     Failure to thrive in adult: Pt cachectic, underweight, anorexic BMI=15.8, multiple pressure ulcers on body. Most likely has not been taken care of. No recorded home address or family contacts. Follows commands but does not speak nor swallow.    -consulted SW, dietician, and S+S  -underweight per nutritionist  -NGT in place  -c/w jevity tube feeds at a slow rate per dietician, to avoid refeeding syndrome.  -monitor electrolytes and replete PRN    PULMONARY:  #Respiratory failure with hypoxia 2/2 PNA  Pt dessatted to 91% on RA, put on 4L NC, became tachypneic, ABG showed respiratory alkalosis, put on HFNC (currently 50/50). Initially concerned for volume overload and PE. CTPE however did not show PE, but did show LLL collapse with mucus plugging and likely post-obstructive PNA.  -pulm consulted, appreciate recs including chest PT, Mucomyst, aspiration precautions, and aggressive pulmonary hygiene  -c/w ceftriaxone 1g qd (3/21) for 5 days  -will ween of HFNC to goal NC  -NGT and aspiration precautions in place  -holding off on azithromycin as pt with prolonged QTc in setting of cardiomyopathy  -f/u urine legionella    RENAL:  #Metabolic acidosis, increased anion gap (IAG): Pt w/ HCO3=18, AG=24, elevated beta hydroxybutyrate 5.1, most likely 2/2 starvation ketosis 2/2 FTT. S/p D5  -NOW RESOLVED.     #Hypernatremia: Initial Uw=387, which uptrended to 150, and started on D5. Most likely 2/2 dehydration/FTT. S/p 500 cc NS. s/p D5 for hypernatremia/nutrition  -NOW RESOLVED.     ENDOCRINE:  #Thyroid dysfunction: Pt w/ large thyroid goiter found on CT chest. Asymmetrical multinodular goiter seen on CTPE. Low TSH=.13, and low T3=56. May be sick thyroid in setting of active medical problems.   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     Prophylactic measures:  F: none  E: Replete K and mag  N: NGT feeds - jevity  A: lovenox 40mg    Transition of care performed with sharing of clinical summary:  PCP Contacted on Admission: (Y/N) --> Name & Phone #:  2) Date of Contact with PCP: TBD  3) PCP Contacted at Discharge: TBD  4) Summary of Handoff Given to PCP: TBD  5) Post-Discharge Appointment Date: TBD 79F w/ unknown PMH brought in by EMS w/ STEMI s/p cardiac cath found to have LV EF=15-20%, severe apical ballooning, akinesis suggestive of Takutsobo Cardiomyopathy (no stents placed). Now w/ improving metabolic encepholopathy, hypernatremia (resolved), AG Metabolic acidosis 2/2 starvation ketosis (resolved), and failure to thrive. CT chest performed with PE ruled out, however showing LLL lung collapse with mucus plugging and likely post-obstructive PNA.     3/2020 cardiac cath: LVEDP=17mmhg+ normal EDP pre and post angio, anterior and apical akinesis. LAD=70% diffuse stenosis, Lcx=30-40% stenosis, RCA=large One vessel coronary artery disease, Frailty score=7, RCA=mild luminal irregularities, diagnol artery 80% stenosis. No stents placed. 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 STEMI protocol initiated. s/p cardiac cath showing LV EF=15-20%, severe apical ballooning, akinesis suggestive of Takutsobo Cardiomyopathy. No stents placed.  Pt unable to provide any history. Takutsobo most likely 2/2 AG metabolic acidosis 2/2 Starvation ketosis vs stress/emotional vs infection. pt is dry to euvolemic on exam today. EKG today showing ST-elevation in V2-V3 and evolving ischemic changes in anterolateral leads - 2/2 Takusubo cardiomyopathy. Ischemic etiology unlikely as pt's cath did not show obstructive CAD  PLAN:  -NGT in place: c/w aspirin 81mg and lipitor 80mg qd  -c/w PO lopressor 12.5 mg q12 as beneficial for takutsobo w/ increased pre-load capability and reduction of tachycardic episodes to allow proper filling of ventricles  -3/20 echo: Aortic valve is calcified, no aortic regurgitation, mitral valve is moderately thickened. Systolic anterior motion of the mitral valve resulting in an LVOT gradient of 62.00 mmHg and severe LVOT obstruction. Moderate mitral regurgitation. Hypertrophy of the basal septum. Left ventricular ejection fraction is 21%. Normal basal wall motion. Rest of the segments are severely hypokinetic to akinetic. Consider stress induced cardiomyopathy.     NEURO/PSYCH:  Encephalopathy: Pt presenting w/ no provided hx by EMS, and not answering questions on exam. Does follow commands. Most likely 2/2 behavioral (recent hx of APS case involvement, reported to unkept at home) vs wernicke/korsakoff (no nystagmus on exam) vs underlying dementia (CTH=microvascular ischemic changes + parenchymal volume loss) vs infection (less likely as afebrile, RVP neg, CXR clear/no cough, u/a clear of infection, improving mental function) vs stroke (no focal deficits, 5/5 muscle strength) vs toxins (utox neg).  Pt more awake and alert today, still not speaking but follows commands     -appreciate dietician and SW recs   -bcx NGTD, lactate cleared  -c/w thiamine for possible wernicke  -will monitor how pt does, if not improved, would consider MRI    #dysphagia  PLAN:  unclear as to why pt can't initiate swallowing reflex, most likely 2/2 encephalopathy  -S+S - similar to prior exam, will continue to monitor  -c/w NGT  -pt on wrist restraints as pulled of mittens and NGT.     #Failure to thrive in adult  PLAN:  Pt cachectic, underweight, anorexic BMI=15.8, multiple pressure ulcers on body. Most likely has not been taken care of. No recorded home address or family contacts. Follows commands but does not speak nor swallow.    -consulted SW, dietician, and S+S  -NGT in place  -c/w jevity tube feeds at a slow rate per dietician, to avoid refeeding syndrome - currently rate at 30  -monitor electrolytes and replete PRN    PULMONARY:  #Respiratory failure with hypoxia 2/2 LLL collapse and likely post-obstructive PNA  Pt dessatted to 91% on RA, put on 4L NC, became tachypneic, ABG showed respiratory alkalosis, put on HFNC (Today, pt remains on HFNC at 50/50). Initially was concerned for volume overload vs. PE. CTPE however did not show PE, but did show LLL collapse with mucus plugging and likely post-obstructive PNA.  -pulm consulted, appreciate recs including chest PT, mucomyst, aspiration precautions, frequent suctioning, and aggressive pulmonary hygiene  -started ceftriaxone 1g qd (3/21) for 5 days  -will ween off of HFNC as tolerated  -NGT and aspiration precautions in place  -holding off on azithromycin as pt with prolonged QTc in setting of cardiomyopathy  -f/u urine legionella    #LLL collapse  -pulm consulted, appreciate recs including chest PT, mucomyst, aspiration precautions, frequent suctioning, and aggressive pulmonary hygiene  -will ween off of HFNC as tolerated    #PNA, likely post-obstructive from mucus plug vs. endobronchial lesion (less likely)  Repeat CTPE study showing "near complete consolidation collapse left lower lobe. This is likely post-obstructive PNA, though per report an endobronchial lesion cannot be ruled out." Likely cause of leukocytosis and acute hypoxemic respiratory failure  -pulm consulted, will attempt to clear mucus plug with chest PT, mucomyst, aspiration precautions, frequent suctioning, and aggressive pulmonary hygiene  -c/w ceftriaxone  -f/u urine legionella - if positive, will start doxycycline  -will hold off on azithromycin due to prolonged QTc and low suspicion for atypical cause of PNA  -while we cannot r/o endobronchial mass, it is less likely. Pt is also poor candidate for invasive w/u  -will send sputum cytology if she produces sputum    RENAL/ELECTROLYTES:  #Metabolic acidosis, increased anion gap (IAG): Pt w/ HCO3=18, AG=24, elevated beta hydroxybutyrate 5.1, most likely 2/2 starvation ketosis 2/2 FTT. S/p D5  -NOW RESOLVED.     #Hypernatremia: Initial Zs=701, which uptrended to 150, and started on D5. Most likely 2/2 dehydration/FTT. S/p 500 cc NS. s/p D5 for hypernatremia/nutrition  -NOW RESOLVED.     ENDOCRINE:  #Thyroid dysfunction: Pt w/ large thyroid goiter found on CT chest. Asymmetrical multinodular goiter seen on CTPE. Low TSH=.13, and low T3=56. May be sick thyroid in setting of active medical problems.   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     Prophylactic measures:  F: none  E: Replete K and mag  N: NGT feeds - jevity  A: lovenox 40mg    Transition of care performed with sharing of clinical summary:  PCP Contacted on Admission: (Y/N) --> Name & Phone #:  2) Date of Contact with PCP: TBD  3) PCP Contacted at Discharge: TBD  4) Summary of Handoff Given to PCP: TBD  5) Post-Discharge Appointment Date: TBD 79F w/ unknown PMH brought in by EMS w/ STEMI s/p cardiac cath found to have LV EF=15-20%, severe apical ballooning, akinesis suggestive of Takutsobo Cardiomyopathy (no stents placed). Now w/ improving metabolic encepholopathy, hypernatremia (resolved), AG Metabolic acidosis 2/2 starvation ketosis (resolved), and failure to thrive. CT chest performed with PE ruled out, however showing LLL lung collapse with mucus plugging and likely post-obstructive PNA.     3/2020 cardiac cath: LVEDP=17mmhg+ normal EDP pre and post angio, anterior and apical akinesis. LAD=70% diffuse stenosis, Lcx=30-40% stenosis, RCA=large One vessel coronary artery disease, Frailty score=7, RCA=mild luminal irregularities, diagnol artery 80% stenosis. No stents placed. 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 STEMI protocol initiated. s/p cardiac cath showing LV EF=15-20%, severe apical ballooning, akinesis suggestive of Takutsobo Cardiomyopathy. No stents placed.  Pt unable to provide any history. Takutsobo most likely 2/2 AG metabolic acidosis 2/2 Starvation ketosis vs stress/emotional vs infection. pt is dry to euvolemic on exam today. EKG today showing ST-elevation in V2-V3 and evolving ischemic changes in anterolateral leads - 2/2 Takusubo cardiomyopathy. Ischemic etiology unlikely as pt's cath did not show obstructive CAD  PLAN:  -NGT in place: c/w aspirin 81mg and lipitor 80mg qd  -c/w PO lopressor 12.5 mg q12 as beneficial for takutsobo w/ increased pre-load capability and reduction of tachycardic episodes to allow proper filling of ventricles  -3/20 echo: Aortic valve is calcified, no aortic regurgitation, mitral valve is moderately thickened. Systolic anterior motion of the mitral valve resulting in an LVOT gradient of 62.00 mmHg and severe LVOT obstruction. Moderate mitral regurgitation. Hypertrophy of the basal septum. Left ventricular ejection fraction is 21%. Normal basal wall motion. Rest of the segments are severely hypokinetic to akinetic. Consider stress induced cardiomyopathy.     NEURO/PSYCH:  Encephalopathy: Pt presenting w/ no provided hx by EMS, and not answering questions on exam. Does follow commands. Most likely 2/2 behavioral (recent hx of APS case involvement, reported to unkept at home). Other etiologies that are less likely include wernicke/korsakoff (no nystagmus on exam) vs underlying dementia (CTH=microvascular ischemic changes + parenchymal volume loss) vs infection (PNA) vs stroke (no focal deficits, 5/5 muscle strength).  Pt more awake and alert today, still not speaking but follows commands     -appreciate dietician and SW recs   -bcx NGTD, lactate cleared  -c/w thiamine for possible wernicke  -will monitor how pt does, if not improved, would consider MRI    #dysphagia  PLAN:  unclear as to why pt can't initiate swallowing reflex, most likely 2/2 encephalopathy  -S+S - similar to prior exam, will continue to monitor  -c/w NGT  -pt on wrist restraints as pulled of mittens and NGT.     #Failure to thrive in adult  PLAN:  Pt cachectic, underweight, anorexic BMI=15.8, multiple pressure ulcers on body. Most likely has not been taken care of. No recorded home address or family contacts. Follows commands but does not speak nor swallow.    -consulted SW, dietician, and S+S  -NGT in place  -c/w jevity tube feeds at a slow rate per dietician, to avoid refeeding syndrome - currently rate at 30  -monitor electrolytes and replete PRN    PULMONARY:  #Respiratory failure with hypoxia 2/2 LLL collapse and likely post-obstructive PNA  Pt dessatted to 91% on RA, put on 4L NC, became tachypneic, ABG showed respiratory alkalosis, put on HFNC (Today, pt remains on HFNC at 50/50). Initially was concerned for volume overload vs. PE. CTPE however did not show PE, but did show LLL collapse with mucus plugging and likely post-obstructive PNA.  -pulm consulted, appreciate recs including chest PT, mucomyst, aspiration precautions, frequent suctioning, and aggressive pulmonary hygiene  -started ceftriaxone 1g qd (3/21) for 5 days  -will ween off of HFNC as tolerated  -NGT and aspiration precautions in place  -holding off on azithromycin as pt with prolonged QTc in setting of cardiomyopathy  -f/u urine legionella    #LLL collapse  -pulm consulted, appreciate recs including chest PT, mucomyst, aspiration precautions, frequent suctioning, and aggressive pulmonary hygiene  -will ween off of HFNC as tolerated    #PNA, likely post-obstructive from mucus plug vs. endobronchial lesion (less likely)  Repeat CTPE study showing "near complete consolidation collapse left lower lobe. This is likely post-obstructive PNA, though per report an endobronchial lesion cannot be ruled out." Likely cause of leukocytosis and acute hypoxemic respiratory failure  -pulm consulted, will attempt to clear mucus plug with chest PT, mucomyst, aspiration precautions, frequent suctioning, and aggressive pulmonary hygiene  -c/w ceftriaxone  -f/u urine legionella - if positive, will start doxycycline  -will hold off on azithromycin due to prolonged QTc and low suspicion for atypical cause of PNA  -while we cannot r/o endobronchial mass, it is less likely. Pt is also poor candidate for invasive w/u  -will send sputum cytology if she produces sputum    RENAL/ELECTROLYTES:  #Metabolic acidosis, increased anion gap (IAG): Pt w/ HCO3=18, AG=24, elevated beta hydroxybutyrate 5.1, most likely 2/2 starvation ketosis 2/2 FTT. S/p D5  -NOW RESOLVED.     #Hypernatremia: Initial Wp=777, which uptrended to 150, and started on D5. Most likely 2/2 dehydration/FTT. S/p 500 cc NS. s/p D5 for hypernatremia/nutrition  -NOW RESOLVED.     ENDOCRINE:  #Thyroid dysfunction: Pt w/ large thyroid goiter found on CT chest. Asymmetrical multinodular goiter seen on CTPE. Low TSH=.13, and low T3=56. May be sick thyroid in setting of active medical problems.   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     Prophylactic measures:  F: none  E: Replete K and mag  N: NGT feeds - jevity  A: lovenox 40mg    Transition of care performed with sharing of clinical summary:  PCP Contacted on Admission: (Y/N) --> Name & Phone #:  2) Date of Contact with PCP: TBD  3) PCP Contacted at Discharge: TBD  4) Summary of Handoff Given to PCP: [id number]  5) Post-Discharge Appointment Date: TBD

## 2020-03-21 NOTE — PROGRESS NOTE ADULT - PROBLEM SELECTOR PLAN 7
Pt w/ large thyroid goiter found on CT chest. Asymmetrical multinodular goiter seen on CTPE. Low TSH=.13, and low T3=56. May be sick thyroid in setting of active medical problems.   -thyroid u/a w/ 2 nodules in L thyroid meeting criteria for FNA  -will f/u as outpt for further workup

## 2020-03-21 NOTE — PROGRESS NOTE ADULT - PROBLEM SELECTOR PLAN 8
Pt cachectic, underweight, anorexic BMI=15.8, multiple pressure ulcers on body. Most likely has not been taken care of. No recorded home address or family contacts. Follows commands but does not speak nor swallow.    -consulted SW, dietician, and S+S  -NGT in place  -c/w jevity tube feeds at a slow rate per dietician, to avoid refeeding syndrome - currently rate at 30  -monitor electrolytes and replete PRN    VTE PPX: Lovenox  Dispo: pending clinical progression  Code: Full Code

## 2020-03-21 NOTE — PROGRESS NOTE ADULT - PROBLEM SELECTOR PLAN 4
Pt cachectic, underweight, anorexic BMI=15.8, multiple pressure ulcers on body. Most likely has not been taken care of. No recorded home address or family contacts. Follows commands but does not speak nor swallow.    -consulted SW, dietician, and S+S  -NGT in place  -c/w jevity tube feeds at a slow rate per dietician, to avoid refeeding syndrome - currently rate at 30  -monitor electrolytes and replete PRN

## 2020-03-21 NOTE — PROGRESS NOTE ADULT - SUBJECTIVE AND OBJECTIVE BOX
Interventional Cardiology PA Adult Progress Note    CC: found unresponsive in her apartment  Subjective Assessment:    Unable to communicate. Not in visible distress.    ROS otherwise negative except as stated in HPI and subjective  MEDICATIONS:  metoprolol tartrate 12.5 milliGRAM(s) Oral every 12 hours  cefTRIAXone   IVPB 1000 milliGRAM(s) IV Intermittent every 24 hours  acetylcysteine 10%  Inhalation 3 milliLiter(s) Inhalation three times a day  albuterol/ipratropium for Nebulization. 3 milliLiter(s) Nebulizer three times a day  atorvastatin 80 milliGRAM(s) Oral every 24 hours  dextrose 40% Gel 15 Gram(s) Oral once PRN  dextrose 50% Injectable 12.5 Gram(s) IV Push once  dextrose 50% Injectable 25 Gram(s) IV Push once  dextrose 50% Injectable 25 Gram(s) IV Push once  glucagon  Injectable 1 milliGRAM(s) IntraMuscular once PRN  insulin lispro (HumaLOG) corrective regimen sliding scale   SubCutaneous every 6 hours  ascorbic acid 500 milliGRAM(s) Oral every 24 hours  aspirin  chewable 81 milliGRAM(s) Enteral Tube daily  chlorhexidine 2% Cloths 1 Application(s) Topical <User Schedule>  dextrose 5%. 1000 milliLiter(s) IV Continuous <Continuous>  enoxaparin Injectable 30 milliGRAM(s) SubCutaneous every 24 hours  multivitamin 1 Tablet(s) Oral every 24 hours    PHYSICAL EXAM:  TELEMETRY:  T(C): 36.8 (03-21-20 @ 15:00), Max: 36.8 (03-21-20 @ 02:08)  HR: 80 (03-21-20 @ 17:00) (58 - 101)  BP: 113/60 (03-21-20 @ 17:00) (86/50 - 118/76)  RR: 17 (03-21-20 @ 17:00) (13 - 36)  SpO2: 100% (03-21-20 @ 17:00) (94% - 100%)  Wt(kg): --  I&O's Summary    20 Mar 2020 07:01  -  21 Mar 2020 07:00  --------------------------------------------------------  IN: 910 mL / OUT: 520 mL / NET: 390 mL    21 Mar 2020 07:01  -  21 Mar 2020 18:06  --------------------------------------------------------  IN: 910 mL / OUT: 210 mL / NET: 700 mL    Palafox: intact  Central/PICC/Mid Line: none                                       Appearance: cachectic	  HEENT:   Normal oral mucosa, PERRL, EOMI	  Neck: Supple   Cardiovascular: Normal S1 S2, No JVD, No murmurs,   Respiratory: unable to make clear assessment as pt is on Hi Flow NC and not able to comply with breathing deeply  Gastrointestinal:  Soft, Non-tender, + BS	  Skin: ecchymosis on lateral left knee.   Extremities: Normal range of motion, No clubbing, cyanosis or edema  Vascular: Peripheral pulses palpable 2+ bilaterally  Neurologic: spontaneously moves all extremities, can perform hand squeeze on both hands and move toes on right foot, but not on left foot.   Psychiatry: Unable to assess 2/2 lack of ability to verbalize    LABS:                      13.3   14.43 )-----------( 107      ( 21 Mar 2020 06:04 )             39.6     03-21    141  |  107  |  32<H>  ----------------------------<  227<H>  4.4   |  24  |  0.48<L>    Ca    9.2      21 Mar 2020 14:26  Phos  3.1     03-21  Mg     2.1     03-21    TPro  5.2<L>  /  Alb  2.5<L>  /  TBili  0.5  /  DBili  x   /  AST  32  /  ALT  15  /  AlkPhos  47  03-20    ASSESSMENT/PLAN:

## 2020-03-21 NOTE — PROGRESS NOTE ADULT - PROBLEM SELECTOR PLAN 2
Respiratory failure with hypoxia 2/2 LLL collapse and likely post-obstructive PNA  Pt dessatted to 91% on RA, put on 4L NC, became tachypneic, ABG showed respiratory alkalosis, put on HFNC (03/21/2020, pt remains on HFNC at 50/50). Initially was concerned for volume overload vs. PE. CTPE however did not show PE, but did show LLL collapse with mucus plugging and likely post-obstructive PNA.  -pulm consulted, appreciate recs including chest PT, mucomyst, aspiration precautions, frequent suctioning, and aggressive pulmonary hygiene  -while we cannot r/o endobronchial mass, it is less likely. Pt is also poor candidate for invasive w/u  -will send sputum cytology if she produces sputum  -started ceftriaxone 1g qd (3/21) for 5 days  -will hold off on azithromycin due to prolonged QTc and low suspicion for atypical cause of PNA  -will ween off of HFNC as tolerated starting on 03/22  -NGT and aspiration precautions in place  -holding off on azithromycin as pt with prolonged QTc in setting of cardiomyopathy  -Urine legionella negative

## 2020-03-22 DIAGNOSIS — R94.31 ABNORMAL ELECTROCARDIOGRAM [ECG] [EKG]: ICD-10-CM

## 2020-03-22 DIAGNOSIS — Q85.9 PHAKOMATOSIS, UNSPECIFIED: ICD-10-CM

## 2020-03-22 DIAGNOSIS — R92.1 MAMMOGRAPHIC CALCIFICATION FOUND ON DIAGNOSTIC IMAGING OF BREAST: ICD-10-CM

## 2020-03-22 DIAGNOSIS — R73.9 HYPERGLYCEMIA, UNSPECIFIED: ICD-10-CM

## 2020-03-22 DIAGNOSIS — D69.6 THROMBOCYTOPENIA, UNSPECIFIED: ICD-10-CM

## 2020-03-22 LAB
ANION GAP SERPL CALC-SCNC: 11 MMOL/L — SIGNIFICANT CHANGE UP (ref 5–17)
ANION GAP SERPL CALC-SCNC: 7 MMOL/L — SIGNIFICANT CHANGE UP (ref 5–17)
BASOPHILS # BLD AUTO: 0.01 K/UL — SIGNIFICANT CHANGE UP (ref 0–0.2)
BASOPHILS # BLD AUTO: 0.02 K/UL — SIGNIFICANT CHANGE UP (ref 0–0.2)
BASOPHILS NFR BLD AUTO: 0.1 % — SIGNIFICANT CHANGE UP (ref 0–2)
BASOPHILS NFR BLD AUTO: 0.2 % — SIGNIFICANT CHANGE UP (ref 0–2)
BUN SERPL-MCNC: 23 MG/DL — SIGNIFICANT CHANGE UP (ref 7–23)
BUN SERPL-MCNC: 28 MG/DL — HIGH (ref 7–23)
CALCIUM SERPL-MCNC: 8.2 MG/DL — LOW (ref 8.4–10.5)
CALCIUM SERPL-MCNC: 8.7 MG/DL — SIGNIFICANT CHANGE UP (ref 8.4–10.5)
CHLORIDE SERPL-SCNC: 107 MMOL/L — SIGNIFICANT CHANGE UP (ref 96–108)
CHLORIDE SERPL-SCNC: 107 MMOL/L — SIGNIFICANT CHANGE UP (ref 96–108)
CO2 SERPL-SCNC: 25 MMOL/L — SIGNIFICANT CHANGE UP (ref 22–31)
CO2 SERPL-SCNC: 27 MMOL/L — SIGNIFICANT CHANGE UP (ref 22–31)
CREAT SERPL-MCNC: 0.35 MG/DL — LOW (ref 0.5–1.3)
CREAT SERPL-MCNC: 0.45 MG/DL — LOW (ref 0.5–1.3)
EOSINOPHIL # BLD AUTO: 0.02 K/UL — SIGNIFICANT CHANGE UP (ref 0–0.5)
EOSINOPHIL # BLD AUTO: 0.02 K/UL — SIGNIFICANT CHANGE UP (ref 0–0.5)
EOSINOPHIL NFR BLD AUTO: 0.2 % — SIGNIFICANT CHANGE UP (ref 0–6)
EOSINOPHIL NFR BLD AUTO: 0.2 % — SIGNIFICANT CHANGE UP (ref 0–6)
GLUCOSE BLDC GLUCOMTR-MCNC: 157 MG/DL — HIGH (ref 70–99)
GLUCOSE BLDC GLUCOMTR-MCNC: 160 MG/DL — HIGH (ref 70–99)
GLUCOSE BLDC GLUCOMTR-MCNC: 172 MG/DL — HIGH (ref 70–99)
GLUCOSE BLDC GLUCOMTR-MCNC: 196 MG/DL — HIGH (ref 70–99)
GLUCOSE SERPL-MCNC: 182 MG/DL — HIGH (ref 70–99)
GLUCOSE SERPL-MCNC: 194 MG/DL — HIGH (ref 70–99)
HCT VFR BLD CALC: 33.3 % — LOW (ref 34.5–45)
HCT VFR BLD CALC: 34.4 % — LOW (ref 34.5–45)
HGB BLD-MCNC: 10.9 G/DL — LOW (ref 11.5–15.5)
HGB BLD-MCNC: 11.2 G/DL — LOW (ref 11.5–15.5)
IMM GRANULOCYTES NFR BLD AUTO: 0.6 % — SIGNIFICANT CHANGE UP (ref 0–1.5)
IMM GRANULOCYTES NFR BLD AUTO: 0.9 % — SIGNIFICANT CHANGE UP (ref 0–1.5)
LYMPHOCYTES # BLD AUTO: 0.88 K/UL — LOW (ref 1–3.3)
LYMPHOCYTES # BLD AUTO: 0.96 K/UL — LOW (ref 1–3.3)
LYMPHOCYTES # BLD AUTO: 7.9 % — LOW (ref 13–44)
LYMPHOCYTES # BLD AUTO: 9.4 % — LOW (ref 13–44)
MAGNESIUM SERPL-MCNC: 2 MG/DL — SIGNIFICANT CHANGE UP (ref 1.6–2.6)
MCHC RBC-ENTMCNC: 29.7 PG — SIGNIFICANT CHANGE UP (ref 27–34)
MCHC RBC-ENTMCNC: 29.9 PG — SIGNIFICANT CHANGE UP (ref 27–34)
MCHC RBC-ENTMCNC: 32.6 GM/DL — SIGNIFICANT CHANGE UP (ref 32–36)
MCHC RBC-ENTMCNC: 32.7 GM/DL — SIGNIFICANT CHANGE UP (ref 32–36)
MCV RBC AUTO: 90.7 FL — SIGNIFICANT CHANGE UP (ref 80–100)
MCV RBC AUTO: 92 FL — SIGNIFICANT CHANGE UP (ref 80–100)
MONOCYTES # BLD AUTO: 0.71 K/UL — SIGNIFICANT CHANGE UP (ref 0–0.9)
MONOCYTES # BLD AUTO: 1 K/UL — HIGH (ref 0–0.9)
MONOCYTES NFR BLD AUTO: 6.4 % — SIGNIFICANT CHANGE UP (ref 2–14)
MONOCYTES NFR BLD AUTO: 9.8 % — SIGNIFICANT CHANGE UP (ref 2–14)
NEUTROPHILS # BLD AUTO: 8.17 K/UL — HIGH (ref 1.8–7.4)
NEUTROPHILS # BLD AUTO: 9.38 K/UL — HIGH (ref 1.8–7.4)
NEUTROPHILS NFR BLD AUTO: 79.9 % — HIGH (ref 43–77)
NEUTROPHILS NFR BLD AUTO: 84.4 % — HIGH (ref 43–77)
NRBC # BLD: 0 /100 WBCS — SIGNIFICANT CHANGE UP (ref 0–0)
NRBC # BLD: 0 /100 WBCS — SIGNIFICANT CHANGE UP (ref 0–0)
PHOSPHATE SERPL-MCNC: 1.6 MG/DL — LOW (ref 2.5–4.5)
PHOSPHATE SERPL-MCNC: 2.3 MG/DL — LOW (ref 2.5–4.5)
PLATELET # BLD AUTO: 105 K/UL — LOW (ref 150–400)
PLATELET # BLD AUTO: 95 K/UL — LOW (ref 150–400)
POTASSIUM SERPL-MCNC: 4.1 MMOL/L — SIGNIFICANT CHANGE UP (ref 3.5–5.3)
POTASSIUM SERPL-MCNC: 4.3 MMOL/L — SIGNIFICANT CHANGE UP (ref 3.5–5.3)
POTASSIUM SERPL-SCNC: 4.1 MMOL/L — SIGNIFICANT CHANGE UP (ref 3.5–5.3)
POTASSIUM SERPL-SCNC: 4.3 MMOL/L — SIGNIFICANT CHANGE UP (ref 3.5–5.3)
RBC # BLD: 3.67 M/UL — LOW (ref 3.8–5.2)
RBC # BLD: 3.74 M/UL — LOW (ref 3.8–5.2)
RBC # FLD: 15.5 % — HIGH (ref 10.3–14.5)
RBC # FLD: 15.5 % — HIGH (ref 10.3–14.5)
SODIUM SERPL-SCNC: 141 MMOL/L — SIGNIFICANT CHANGE UP (ref 135–145)
SODIUM SERPL-SCNC: 143 MMOL/L — SIGNIFICANT CHANGE UP (ref 135–145)
WBC # BLD: 10.22 K/UL — SIGNIFICANT CHANGE UP (ref 3.8–10.5)
WBC # BLD: 11.11 K/UL — HIGH (ref 3.8–10.5)
WBC # FLD AUTO: 10.22 K/UL — SIGNIFICANT CHANGE UP (ref 3.8–10.5)
WBC # FLD AUTO: 11.11 K/UL — HIGH (ref 3.8–10.5)

## 2020-03-22 PROCEDURE — 99232 SBSQ HOSP IP/OBS MODERATE 35: CPT

## 2020-03-22 PROCEDURE — 71045 X-RAY EXAM CHEST 1 VIEW: CPT | Mod: 26

## 2020-03-22 RX ORDER — POTASSIUM PHOSPHATE, MONOBASIC POTASSIUM PHOSPHATE, DIBASIC 236; 224 MG/ML; MG/ML
23 INJECTION, SOLUTION INTRAVENOUS ONCE
Refills: 0 | Status: COMPLETED | OUTPATIENT
Start: 2020-03-22 | End: 2020-03-22

## 2020-03-22 RX ORDER — DEXTROSE 50 % IN WATER 50 %
25 SYRINGE (ML) INTRAVENOUS ONCE
Refills: 0 | Status: DISCONTINUED | OUTPATIENT
Start: 2020-03-22 | End: 2020-03-27

## 2020-03-22 RX ORDER — DEXTROSE 50 % IN WATER 50 %
12.5 SYRINGE (ML) INTRAVENOUS ONCE
Refills: 0 | Status: DISCONTINUED | OUTPATIENT
Start: 2020-03-22 | End: 2020-03-27

## 2020-03-22 RX ORDER — INSULIN LISPRO 100/ML
VIAL (ML) SUBCUTANEOUS
Refills: 0 | Status: DISCONTINUED | OUTPATIENT
Start: 2020-03-22 | End: 2020-03-27

## 2020-03-22 RX ORDER — DEXTROSE 50 % IN WATER 50 %
15 SYRINGE (ML) INTRAVENOUS ONCE
Refills: 0 | Status: DISCONTINUED | OUTPATIENT
Start: 2020-03-22 | End: 2020-03-27

## 2020-03-22 RX ORDER — POTASSIUM PHOSPHATE, MONOBASIC POTASSIUM PHOSPHATE, DIBASIC 236; 224 MG/ML; MG/ML
21 INJECTION, SOLUTION INTRAVENOUS ONCE
Refills: 0 | Status: DISCONTINUED | OUTPATIENT
Start: 2020-03-22 | End: 2020-03-22

## 2020-03-22 RX ORDER — SODIUM CHLORIDE 9 MG/ML
1000 INJECTION, SOLUTION INTRAVENOUS
Refills: 0 | Status: DISCONTINUED | OUTPATIENT
Start: 2020-03-22 | End: 2020-03-27

## 2020-03-22 RX ORDER — GLUCAGON INJECTION, SOLUTION 0.5 MG/.1ML
1 INJECTION, SOLUTION SUBCUTANEOUS ONCE
Refills: 0 | Status: DISCONTINUED | OUTPATIENT
Start: 2020-03-22 | End: 2020-03-27

## 2020-03-22 RX ADMIN — Medication 3 MILLILITER(S): at 06:12

## 2020-03-22 RX ADMIN — POTASSIUM PHOSPHATE, MONOBASIC POTASSIUM PHOSPHATE, DIBASIC 64.42 MILLIMOLE(S): 236; 224 INJECTION, SOLUTION INTRAVENOUS at 11:39

## 2020-03-22 RX ADMIN — Medication 81 MILLIGRAM(S): at 11:40

## 2020-03-22 RX ADMIN — CHLORHEXIDINE GLUCONATE 1 APPLICATION(S): 213 SOLUTION TOPICAL at 06:10

## 2020-03-22 RX ADMIN — Medication 1: at 12:07

## 2020-03-22 RX ADMIN — Medication 3 MILLILITER(S): at 13:58

## 2020-03-22 RX ADMIN — Medication 12.5 MILLIGRAM(S): at 18:00

## 2020-03-22 RX ADMIN — CEFTRIAXONE 100 MILLIGRAM(S): 500 INJECTION, POWDER, FOR SOLUTION INTRAMUSCULAR; INTRAVENOUS at 18:03

## 2020-03-22 RX ADMIN — Medication 3 MILLILITER(S): at 22:11

## 2020-03-22 RX ADMIN — Medication 3 MILLILITER(S): at 22:13

## 2020-03-22 RX ADMIN — Medication 1: at 22:10

## 2020-03-22 RX ADMIN — Medication 500 MILLIGRAM(S): at 18:00

## 2020-03-22 RX ADMIN — ATORVASTATIN CALCIUM 80 MILLIGRAM(S): 80 TABLET, FILM COATED ORAL at 18:00

## 2020-03-22 RX ADMIN — Medication 1: at 06:56

## 2020-03-22 RX ADMIN — ENOXAPARIN SODIUM 30 MILLIGRAM(S): 100 INJECTION SUBCUTANEOUS at 06:11

## 2020-03-22 RX ADMIN — Medication 3 MILLILITER(S): at 13:56

## 2020-03-22 NOTE — PROGRESS NOTE ADULT - PROBLEM SELECTOR PLAN 3
Pt presenting w/ no provided hx by EMS, and not answering questions on exam. Does follow commands. Most likely 2/2 behavioral (recent hx of APS case involvement, reported to unkept at home). Other etiologies that are less likely include wernicke/korsakoff (no nystagmus on exam) vs underlying dementia (CTH=microvascular ischemic changes + parenchymal volume loss) vs infection (PNA) vs stroke (no focal deficits, 5/5 muscle strength).  Pt more awake and alert today, still not speaking but follows commands     -appreciate dietician and SW recs   -bcx NGTD, lactate cleared  -c/w thiamine for possible wernicke  -will monitor how pt does, if not improved, would consider MRI R/T LLL collapse and likely post-obstructive PNA, HFNC transitioned to 6L humidifed NC on 3/22 AM and saturating well.   - CTPE 3/20: r/o PE, but did show LLL collapse with mucus plugging and likely post-obstructive PNA.  - Urine Legionella negative  - Cannot r/o endobronchial mass, but less likely, poor candidate for invasive w/u  - Send sputum cytology if she produces sputum  - Pulm consulted, appreciate recs including chest PT, mucomyst, aspiration precautions, frequent suctioning, aggressive pulmonary hygiene  - Ceftriaxone 1g IV QD (initiated 3/21) to complete 5 day course (day 2/5)  - Hold off on azithromycin due to prolonged QTc (517 ms on 3/22) and low suspicion for atypical cause of PNA  - NGT and aspiration precautions in place R/T LLL collapse and likely post-obstructive PNA, HFNC transitioned to 6L humidifed NC on 3/22 AM and saturating well.   - RVP negative  - CTPE 3/20: r/o PE, but did show LLL collapse with mucus plugging and likely post-obstructive PNA.  - Urine Legionella negative  - Cannot r/o endobronchial mass, but less likely, poor candidate for invasive w/u  - Send sputum cytology if she produces sputum  - Pulm consulted, appreciate recs including chest PT, mucomyst, aspiration precautions, frequent suctioning, aggressive pulmonary hygiene  - Ceftriaxone 1g IV QD (initiated 3/21) to complete 5 day course (day 2/5)  - Hold off on azithromycin due to prolonged QTc (517 ms on 3/22) and low suspicion for atypical cause of PNA  - NGT and aspiration precautions in place R/T LLL collapse and likely post-obstructive PNA, HFNC transitioned to 4L humidified NC on 3/22 AM and saturating well.   - RVP negative  - CTPE 3/20: r/o PE, but did show LLL collapse with mucus plugging and likely post-obstructive PNA.  - Urine Legionella negative  - Cannot r/o endobronchial mass, but less likely, poor candidate for invasive w/u  - Send sputum cytology if she produces sputum  - Pulm consulted, appreciate recs including chest PT, mucomyst, aspiration precautions, frequent suctioning, aggressive pulmonary hygiene  - Ceftriaxone 1g IV QD (initiated 3/21) to complete 5 day course (day 2/5)  - Hold off on azithromycin due to prolonged QTc (517 ms on 3/22) and low suspicion for atypical cause of PNA  - NGT and aspiration precautions in place R/T LLL collapse and likely post-obstructive PNA, HFNC transitioned to 4L humidified NC on 3/22 AM and saturating well.   - RVP negative  - CTPE 3/20: r/o PE, but did show LLL collapse with mucus plugging and likely post-obstructive PNA. Cannot r/o endobronchial mass on CT PE, but less likely, poor candidate for invasive w/u  - CXR 3/22: without LLL collapse, small left effusion, resolution L lung infiltrates as described  - Urine Legionella negative  - Send sputum cytology if she produces sputum  - Pulm consulted, appreciate recs including chest PT, mucomyst, aspiration precautions, frequent suctioning, aggressive pulmonary hygiene  - Ceftriaxone 1g IV QD (initiated 3/21) to complete 5 day course (day 2/5)  - Hold off on azithromycin due to prolonged QTc and low suspicion for atypical cause of PNA  - NGT and aspiration precautions in place

## 2020-03-22 NOTE — PROGRESS NOTE ADULT - PROBLEM SELECTOR PLAN 2
Respiratory failure with hypoxia 2/2 LLL collapse and likely post-obstructive PNA  Pt dessatted to 91% on RA, put on 4L NC, became tachypneic, ABG showed respiratory alkalosis, put on HFNC (03/21/2020, pt remains on HFNC at 50/50). Initially was concerned for volume overload vs. PE. CTPE however did not show PE, but did show LLL collapse with mucus plugging and likely post-obstructive PNA.  -pulm consulted, appreciate recs including chest PT, mucomyst, aspiration precautions, frequent suctioning, and aggressive pulmonary hygiene  -while we cannot r/o endobronchial mass, it is less likely. Pt is also poor candidate for invasive w/u  -will send sputum cytology if she produces sputum  -started ceftriaxone 1g qd (3/21) for 5 days  -will hold off on azithromycin due to prolonged QTc and low suspicion for atypical cause of PNA  -will ween off of HFNC as tolerated starting on 03/22  -NGT and aspiration precautions in place  -holding off on azithromycin as pt with prolonged QTc in setting of cardiomyopathy  -Urine legionella negative QTc 517 ms on 3/22  - Daily ECGs  - Avoid QTc prolonging drugs

## 2020-03-22 NOTE — PROGRESS NOTE ADULT - ASSESSMENT
78 yo Female unknown PMHx brought in by EMS w/ STEMI (in all leads) and STEMI code was activated. Pt s/p diagnostic cardiac cath 03/18/2020 with Dr Chucky Castanon showing pLAD 70%, pD1 80-90% but no plan for intervention. The patient has had hypoxemic respiratory failure since admission and initial CT Chest on 3/18/2020 showed evidence of mucus plugging and small infiltrate in LLL. Pulmonary consulted and she was started on Ceftriaxone for pneumonia. While getting from bed to chair the patient had an episode of desaturation 03/19/2020 and thus was sent to CT to which ruled out PE but showed essentially total LLL collapse likely from mucus plugging. Pt started on HFNC. Now w/ improving metabolic encephalopathy, hypernatremia (resolved), AG Metabolic acidosis 2/2 starvation ketosis (resolved), and failure to thrive. Pt hemodynamically stable. Pt has NGT as pt has lost swallow reflex. Speech and swallow will reevaluate on Monday. Pt has nondestructive, nonviolent soft wrist restraints b/l as pt pulled her initial NGT.  Pt has jaime catheter for strict Is and Os. Pt has step daughter who does not want to be involved in the care of the patient. Pt is being stepped down to [facility] for further management including ethics consult on Monday to determine further course of action. 78 yo Female unknown PMHx brought in by EMS w/ STEMI (in all leads) and STEMI code was activated s/p diagnostic cardiac cath 03/18/2020 with Dr Castanon revealing pLAD 70%, pD1 80-90% but no plan for intervention & LVEF 15-20%, severe apical ballooning, akinesis suggestive of Takutsobo Cardiomyopathy, in CCU pt with hypoxemic respiratory failure since admission and initial CT Chest on 3/18/2020 revealed e/o mucus plugging and small infiltrate in LLL, started on Ceftriaxone for likely post-obstructive PNA, desaturated on 03/19/2020 and CT ruled out PE but showed essentially total LLL collapse likely from mucus plugging, started on HFNC, continues with Now w/ improving metabolic encephalopathy, hypernatremia (resolved), AG Metabolic acidosis 2/2 starvation ketosis (resolved), and failure to thrive. Pt hemodynamically stable. Pt has NGT as pt has lost swallow reflex. Speech and swallow will reevaluate on Monday. Pt has nondestructive, nonviolent soft wrist restraints b/l as pt pulled her initial NGT.  Pt has jaime catheter for strict Is and Os. Pt has step daughter who does not want to be involved in the care of the patient. Pt is being stepped down to [facility] for further management including ethics consult on Monday to determine further course of action.     . Pt remained encephalopathic after resolution of hypernatremia. Encephalopathy most likely behavioral (recent hx of APS case involvement, reported to unkept at home). She also failed bedside S+S. Speech and swallow were consulted with pt not having adequate swallowing. NGT placed with b/l wrist restraints as pt pulled out NGT x1. Nutrition was consulted for FTT and starvation ketosis with her being started on feeds with careful repletion of electrolytes. She is still with high O2 requirements on HFNC for which CTPE was performed. PE was ruled out, but LLL collapse was seen likely 2/2 mucus plugging with likely post-obstructive PNA. Pulm was consulted with aggressive pulmonary toilet implemented and ceftriaxone 1g qd started. 80 yo underweight female with FTT and unknown PMHx brought in by EMS after found down on floor for unknown period of time, found to have STEMI (in all leads) and STEMI code was activated s/p diagnostic cardiac cath 03/18/2020 with Dr Castanon revealing pLAD 70%, pD1 80-90% but no plan for intervention & LVEF 15-20%, cath also revealing severe apical ballooning, akinesis suggestive of Tak[place]sobo Cardiomyopathy, pt with hypoxemic respiratory failure since admission and initial CT Chest on 3/18/2020 revealed e/o mucus plugging and small infiltrate in LLL, started on Ceftriaxone for likely post-obstructive PNA, desaturated on 03/19/2020 and CT ruled out PE but showed essentially total LLL collapse likely from mucus plugging, started on HFNC with improvement in inflation of LLL, initially with AG metabolic acidosis 2/2 starvation and hypernatremia (now resolved) but continues with encephalopathy but improving possibly in setting of infection vs. FTT vs underlying dementia/other underlying neuro issue,  NGT placed as pt has lost swallow reflex/dysphagia for meds/nutrition, nonviolent soft wrist restraints b/l in-placed as pt pulled her initial NGT, stepped down to 5U for additional management. Multiple complex SW issues, has step daughter who does not want to be involved in the care of the patient, plan to consult Ethics on Monday, may need guardian appointment. 80 yo underweight female with FTT and unknown PMHx brought in by EMS after found down on floor for unknown period of time, found to have STEMI (in all leads) and STEMI code was activated s/p diagnostic cardiac cath 03/18/2020 with Dr Castanon revealing pLAD 70%, pD1 80-90%, no current plan for intervention, cath also revealing LVEF 15-20% with severe apical ballooning & akinesis suggestive of Takutsobo Cardiomyopathy,  pt with hypoxemic respiratory failure since admission and initial CT Chest on 3/18/2020 revealed e/o mucus plugging and small infiltrate in LLL, started on Ceftriaxone for likely post-obstructive PNA, desaturated on 03/19/2020 and CT ruled out PE but showed essentially total LLL collapse likely from mucus plugging, started on HFNC with improvement in inflation of LLL, initially with AG metabolic acidosis 2/2 starvation and hypernatremia (now resolved) but continues with improving encephalopathy possibly in setting of infection vs. FTT vs underlying dementia/other underlying neuro issue,  NGT placed as pt has lost swallow reflex for meds/nutrition, nonviolent soft wrist restraints in place B/L as pt pulled her initial NGT, stepped down to 5U for additional management. Multiple complex SW issues, has step daughter who does not want to be involved in the care of the patient, plan to consult Ethics on Monday, may need guardian appointed. 80 yo underweight female with FTT and unknown PMHx brought in by EMS after found down on floor for unknown period of time, found to have STEMI (in all leads) and STEMI code was activated s/p diagnostic cardiac cath 03/18/2020 with Dr Castanon revealing pLAD 70%, pD1 80-90%, no current plan for intervention, cath also revealing LVEF 15-20% with severe apical ballooning & akinesis suggestive of Takutsobo Cardiomyopathy,  pt with hypoxemic respiratory failure since admission and initial CT Chest on 3/18/2020 revealed e/o mucus plugging and small infiltrate in LLL, started on Ceftriaxone for likely post-obstructive PNA, desaturated on 03/19/2020 and CT ruled out PE but showed essentially total LLL collapse likely from mucus plugging, started on HFNC with improvement in inflation of LLL which has subsequently been titrated to 4L NC, initially with AG metabolic acidosis 2/2 starvation and hypernatremia (now resolved) but continues with improving encephalopathy possibly in setting of infection vs. FTT vs underlying dementia/other underlying neuro issue,  NGT placed as pt has lost swallow reflex for meds/nutrition, nonviolent soft wrist restraints in place B/L as pt pulled her initial NGT, stepped down to 5U for additional management. Multiple complex SW issues, has step daughter who does not want to be involved in the care of the patient, plan to consult Ethics on Monday, may need guardian appointed.

## 2020-03-22 NOTE — PROGRESS NOTE ADULT - PROBLEM SELECTOR PLAN 5
unclear as to why pt can't initiate swallowing reflex, most likely 2/2 encephalopathy  -S+S - similar to prior exam, will continue to monitor  -c/w NGT  -pt on wrist restraints as pulled of mittens and NGT. Pt cachectic, underweight, anorexic BMI=15.8, multiple pressure ulcers on body. Most likely has not been taken care of. No recorded home address or family contacts. Follows commands but does not speak nor swallow.    -consulted SW, dietician, and S+S  -NGT in place  -c/w jevity tube feeds at a slow rate per dietician, to avoid refeeding syndrome - currently rate at 30  -monitor electrolytes and replete PRN Platelets 201>175>126>107>105  - Stabilizing over past 2 days, likely in setting of infection  - c/w Lovenox for now and continue to monitor as d/w Dr. Day

## 2020-03-22 NOTE — PROGRESS NOTE ADULT - PROBLEM SELECTOR PLAN 10
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    Dispo: pending clinical progression  Code: Full Code  VTE ppx: Lovenox    Case d/w Dr. Day - CT chest 3/18: Few calcifications are seen in the breasts bilaterally.  - Refer for outpatient mammogram    Dispo: pending clinical progression  Code: Full Code  VTE ppx: Lovenox    Case d/w Dr. Day

## 2020-03-22 NOTE — PROGRESS NOTE ADULT - PROBLEM SELECTOR PLAN 7
Pt w/ large thyroid goiter found on CT chest. Asymmetrical multinodular goiter seen on CTPE. Low TSH=.13, and low T3=56. May be sick thyroid in setting of active medical problems.   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 Cachectic, underweight, anorexic BMI=15.8, multiple pressure ulcers on body, RD/ SLP/SW following  - Most likely has not been taken care of, no recorded home address or family contacts.   - c/w Jevity 1.2 via NGT @ 40 cc/hr, monitor electrolytes closely for refeeding syndrome  - Continue to monitor electrolytes (including Phos) and replete PRN Unclear as to why pt can't initiate swallowing reflex, most likely 2/2 encephalopathy  - SLP evaluated pt on 3/22 and diet advanced to pureed diet w/ thin liquids with supplemental TFs   - Requires 100% supervision/feeding assistance & extra time to swallow. Cue to swallow as needed. Ensure oral cavity is clear prior to administering next bite or sip, upright position during and 30 min. follow meals. Misc orders placed.   - c/w wrist restraints for now as she had pulled of mittens and NGT previously

## 2020-03-22 NOTE — PROGRESS NOTE ADULT - PROBLEM SELECTOR PLAN 8
Pt cachectic, underweight, anorexic BMI=15.8, multiple pressure ulcers on body. Most likely has not been taken care of. No recorded home address or family contacts. Follows commands but does not speak nor swallow.    -consulted SW, dietician, and S+S  -NGT in place  -c/w jevity tube feeds at a slow rate per dietician, to avoid refeeding syndrome - currently rate at 30  -monitor electrolytes and replete PRN    VTE PPX: Lovenox  Dispo: pending clinical progression  Code: Full Code Pt w/ large thyroid goiter found on CT chest. Asymmetrical multinodular goiter seen on CTPE. Low TSH=.13, and low T3=56. May be sick thyroid in setting of active medical problems.   -thyroid u/a w/ 2 nodules in L thyroid meeting criteria for FNA  -will f/u as outpt for further workup Large thyroid goiter found on CT chest, asymmetrical multinodular goiter seen on CTPE.  - Low TSH=.13, and low T3=56. May be sick thyroid in setting of active medical problems.   - US Thyroid & Parathyroid 3/19: 2 nodules in L thyroid meeting criteria for FNA  - Pt to follow up as outpt for further workup Cachectic, underweight, anorexic BMI=15.8, multiple pressure ulcers on body, RD/ SLP/SW following  - Most likely has not been taken care of, no recorded home address or family contacts.   - Puree w/ thin liquids diet initiated on 3/22 as per SLP eval with supplemental TFs. c/w Jevity 1.2 via NGT @ 40 cc/hr.   - Calorie count ordered and to be complete by dietitian prior to removing NGT d/t concerns pt may not meet nutritional needs.   - Monitor electrolytes (including Phos) closely for refeeding syndrome and replete PRN.

## 2020-03-22 NOTE — PROGRESS NOTE ADULT - PROBLEM SELECTOR PLAN 6
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 unclear as to why pt can't initiate swallowing reflex, most likely 2/2 encephalopathy  -S+S - similar to prior exam, will continue to monitor  -c/w NGT  -pt on wrist restraints as pulled of mittens and NGT. Unclear as to why pt can't initiate swallowing reflex, most likely 2/2 encephalopathy  - c/w NGT for nutrition & meds for now, f/u repeat speech and swallow eval Monday  - c/w wrist restraints as she had pulled of mittens and NGT previously FS , HgbA1C WNL @ 5.5%, likely in setting of critical illness  - Blood glucose monitoring 4x/d, [place]

## 2020-03-22 NOTE — PROGRESS NOTE ADULT - PROBLEM SELECTOR PLAN 9
Pt cachectic, underweight, anorexic BMI=15.8, multiple pressure ulcers on body. Most likely has not been taken care of. No recorded home address or family contacts. Follows commands but does not speak nor swallow.    -consulted SW, dietician, and S+S  -NGT in place  -c/w jevity tube feeds at a slow rate per dietician, to avoid refeeding syndrome - currently rate at 30  -monitor electrolytes and replete PRN    VTE PPX: Lovenox  Dispo: pending clinical progression  Code: Full Code B/L adrenal hyperplasia w/ L 1.7cm nodule on L adrenal gland  - Most likely incidentaloma  - No concern for pheochromocytoma as rare and pt NSR, w/ hypotension/normotension.    Dispo: pending clinical progression  Code: Full Code  VTE ppx: Lovenox    Case d/w Dr. Day - CT chest 3/18: Few calcifications are seen in the breasts bilaterally.  - Refer for outpatient mammogram Large thyroid goiter found on CT chest, asymmetrical multinodular goiter seen on CTPE.  - Low TSH=.13, and low T3=56. f/u repeat AM TFTs.  May be sick thyroid in setting of active medical problems.   - US Thyroid & Parathyroid 3/19: 2 nodules in L thyroid meeting criteria for FNA  - Pt to follow up as outpt for further workup

## 2020-03-22 NOTE — PROGRESS NOTE ADULT - PROBLEM SELECTOR PLAN 4
Pt cachectic, underweight, anorexic BMI=15.8, multiple pressure ulcers on body. Most likely has not been taken care of. No recorded home address or family contacts. Follows commands but does not speak nor swallow.    -consulted SW, dietician, and S+S  -NGT in place  -c/w jevity tube feeds at a slow rate per dietician, to avoid refeeding syndrome - currently rate at 30  -monitor electrolytes and replete PRN Verbalizing one word repeatedly, able to squeeze B/L UE and wiggle R toes when prompted, unable to perform EOM test, improving  -  Most likely 2/2 behavioral (recent hx of APS case involvement, reported to unkept at home) vs infection (PNA), Other etiologies that are less likely include wernicke/korsakoff (no nystagmus on exam) vs underlying dementia (CTH=microvascular ischemic changes + parenchymal volume loss) vs stroke (moving all extremities spontaneously)  - Appreciate dietician and SW recs   - Bcx NGTD, lactate cleared  - c/w Thiamine for possible wernicke  - Continue to hold off on MRI/neuro consult for now as appears to be improving d/w Dr. Day Verbalizing one word repeatedly, able to squeeze B/L UE and wiggle R toes when prompted, unable to perform EOM test, improving  -  Most likely 2/2 behavioral (recent hx of APS case involvement, reported to unkept at home) vs infection (PNA), Other etiologies that are less likely include wernicke/korsakoff (no nystagmus on exam) vs underlying dementia (CTH=microvascular ischemic changes + parenchymal volume loss) vs stroke (moving all extremities spontaneously)  - Utox & blood alcohol negative on admit  - Bcx NGTD, lactate cleared  - c/w Thiamine for possible wernicke  - Continue to hold off on MRI/neuro consult for now as appears to be improving d/w Dr. Day

## 2020-03-22 NOTE — PROGRESS NOTE ADULT - SUBJECTIVE AND OBJECTIVE BOX
Interventional Cardiology PA Adult Progress Note    Subjective Assessment:  Pt seen and examined at bedside. Unable to speak. Unable to nod to answer questions. Unable to obtain ROS.   	  MEDICATIONS:  metoprolol tartrate 12.5 milliGRAM(s) Oral every 12 hours  cefTRIAXone   IVPB 1000 milliGRAM(s) IV Intermittent every 24 hours  acetylcysteine 10%  Inhalation 3 milliLiter(s) Inhalation three times a day  albuterol/ipratropium for Nebulization. 3 milliLiter(s) Nebulizer three times a day  atorvastatin 80 milliGRAM(s) Oral every 24 hours  dextrose 40% Gel 15 Gram(s) Oral once PRN  dextrose 50% Injectable 12.5 Gram(s) IV Push once  dextrose 50% Injectable 25 Gram(s) IV Push once  dextrose 50% Injectable 25 Gram(s) IV Push once  glucagon  Injectable 1 milliGRAM(s) IntraMuscular once PRN  insulin lispro (HumaLOG) corrective regimen sliding scale   SubCutaneous every 6 hours  ascorbic acid 500 milliGRAM(s) Oral every 24 hours  aspirin  chewable 81 milliGRAM(s) Enteral Tube daily  chlorhexidine 2% Cloths 1 Application(s) Topical <User Schedule>  dextrose 5%. 1000 milliLiter(s) IV Continuous <Continuous>  enoxaparin Injectable 30 milliGRAM(s) SubCutaneous every 24 hours  multivitamin 1 Tablet(s) Oral every 24 hours  potassium phosphate IVPB 23 milliMole(s) IV Intermittent once    [PHYSICAL EXAM:  TELEMETRY:  T(C): 36.6 (03-22-20 @ 09:37), Max: 36.8 (03-21-20 @ 15:00)  HR: 61 (03-22-20 @ 09:13) (61 - 96)  BP: 95/51 (03-22-20 @ 08:10) (90/51 - 118/76)  RR: 16 (03-22-20 @ 09:13) (15 - 24)  SpO2: 100% (03-22-20 @ 09:13) (94% - 100%)  Wt(kg): --  I&O's Summary    21 Mar 2020 07:01  -  22 Mar 2020 07:00  --------------------------------------------------------  IN: 1590 mL / OUT: 710 mL / NET: 880 mL                               Appearance: cachectic	  HEENT:   Normal oral mucosa, PERRL, EOMI	  Neck: Supple   Cardiovascular: Normal S1 S2, No JVD, No murmurs,   Respiratory: unable to make clear assessment as pt is on Hi Flow NC and not able to comply with breathing deeply  Gastrointestinal:  Soft, Non-tender, + BS	  Skin: ecchymosis on lateral left knee.   Extremities: Normal range of motion, No clubbing, cyanosis or edema  Vascular: Peripheral pulses palpable 2+ bilaterally  Neurologic: spontaneously moves all extremities, can perform hand squeeze on both hands and move toes on right foot, but not on left foot.   Psychiatry: Unable to assess 2/2 lack of ability to verbalize                          11.2   11.11 )-----------( 105      ( 22 Mar 2020 08:16 )             34.4     03-22    141  |  107  |  28<H>  ----------------------------<  182<H>  4.1   |  27  |  0.45<L>    Ca    8.7      22 Mar 2020 08:16  Phos  1.6     03-22  Mg     2.0     03-22 Interventional Cardiology PA Adult Progress Note    Subjective Assessment:  Pt seen and examined at bedside. Unable to speak. Unable to nod to answer questions. Unable to obtain ROS.   	  MEDICATIONS:  metoprolol tartrate 12.5 milliGRAM(s) Oral every 12 hours  cefTRIAXone   IVPB 1000 milliGRAM(s) IV Intermittent every 24 hours  acetylcysteine 10%  Inhalation 3 milliLiter(s) Inhalation three times a day  albuterol/ipratropium for Nebulization. 3 milliLiter(s) Nebulizer three times a day  atorvastatin 80 milliGRAM(s) Oral every 24 hours  dextrose 40% Gel 15 Gram(s) Oral once PRN  dextrose 50% Injectable 12.5 Gram(s) IV Push once  dextrose 50% Injectable 25 Gram(s) IV Push once  dextrose 50% Injectable 25 Gram(s) IV Push once  glucagon  Injectable 1 milliGRAM(s) IntraMuscular once PRN  insulin lispro (HumaLOG) corrective regimen sliding scale   SubCutaneous every 6 hours  ascorbic acid 500 milliGRAM(s) Oral every 24 hours  aspirin  chewable 81 milliGRAM(s) Enteral Tube daily  chlorhexidine 2% Cloths 1 Application(s) Topical <User Schedule>  dextrose 5%. 1000 milliLiter(s) IV Continuous <Continuous>  enoxaparin Injectable 30 milliGRAM(s) SubCutaneous every 24 hours  multivitamin 1 Tablet(s) Oral every 24 hours  potassium phosphate IVPB 23 milliMole(s) IV Intermittent once    [PHYSICAL EXAM:  TELEMETRY:  T(C): 36.6 (03-22-20 @ 09:37), Max: 36.8 (03-21-20 @ 15:00)  HR: 61 (03-22-20 @ 09:13) (61 - 96)  BP: 95/51 (03-22-20 @ 08:10) (90/51 - 118/76)  RR: 16 (03-22-20 @ 09:13) (15 - 24)  SpO2: 100% (03-22-20 @ 09:13) (94% - 100%)  Wt(kg): --  I&O's Summary    21 Mar 2020 07:01  -  22 Mar 2020 07:00  --------------------------------------------------------  IN: 1590 mL / OUT: 710 mL / NET: 880 mL                               Appearance: cachectic on HFNC, no signs of distress.   HEENT:   Normal oral mucosa, unable to follow fingers with eyes  Neck: No JCD  Cardiovascular: Normal S1 S2, no murmur auscultated but decreased heart sounds 2/2  HFNC   Respiratory: unable to make clear assessment as pt is on HFNC and not able to comply with breathing deeply  Gastrointestinal:  Soft, Non-tender, + BS	x4  Skin: ecchymosis on lateral left knee.   Extremities: Normal range of motion, No clubbing, cyanosis or edema  Vascular: Peripheral pulses palpable 2+ bilaterally  Neurologic: spontaneously moves all extremities, can perform hand squeeze on both hands and move toes on right foot, but not on left foot.   Psychiatry: Unable to assess 2/2 lack of ability to verbalize                          11.2   11.11 )-----------( 105      ( 22 Mar 2020 08:16 )             34.4     03-22    141  |  107  |  28<H>  ----------------------------<  182<H>  4.1   |  27  |  0.45<L>    Ca    8.7      22 Mar 2020 08:16  Phos  1.6     03-22  Mg     2.0     03-22 Interventional Cardiology PA Adult Progress Note    Subjective Assessment:  Pt seen and examined at bedside. Unable to speak. Unable to nod to answer questions. Unable to obtain ROS.   	  MEDICATIONS:  metoprolol tartrate 12.5 milliGRAM(s) Oral every 12 hours  cefTRIAXone   IVPB 1000 milliGRAM(s) IV Intermittent every 24 hours  acetylcysteine 10%  Inhalation 3 milliLiter(s) Inhalation three times a day  albuterol/ipratropium for Nebulization. 3 milliLiter(s) Nebulizer three times a day  atorvastatin 80 milliGRAM(s) Oral every 24 hours  dextrose 40% Gel 15 Gram(s) Oral once PRN  dextrose 50% Injectable 12.5 Gram(s) IV Push once  dextrose 50% Injectable 25 Gram(s) IV Push once  dextrose 50% Injectable 25 Gram(s) IV Push once  glucagon  Injectable 1 milliGRAM(s) IntraMuscular once PRN  insulin lispro (HumaLOG) corrective regimen sliding scale   SubCutaneous every 6 hours  ascorbic acid 500 milliGRAM(s) Oral every 24 hours  aspirin  chewable 81 milliGRAM(s) Enteral Tube daily  chlorhexidine 2% Cloths 1 Application(s) Topical <User Schedule>  dextrose 5%. 1000 milliLiter(s) IV Continuous <Continuous>  enoxaparin Injectable 30 milliGRAM(s) SubCutaneous every 24 hours  multivitamin 1 Tablet(s) Oral every 24 hours  potassium phosphate IVPB 23 milliMole(s) IV Intermittent once    [PHYSICAL EXAM:  TELEMETRY:  T(C): 36.6 (03-22-20 @ 09:37), Max: 36.8 (03-21-20 @ 15:00)  HR: 61 (03-22-20 @ 09:13) (61 - 96)  BP: 95/51 (03-22-20 @ 08:10) (90/51 - 118/76)  RR: 16 (03-22-20 @ 09:13) (15 - 24)  SpO2: 100% (03-22-20 @ 09:13) (94% - 100%)  Wt(kg): --  I&O's Summary    21 Mar 2020 07:01  -  22 Mar 2020 07:00  --------------------------------------------------------  IN: 1590 mL / OUT: 710 mL / NET: 880 mL                               Appearance: cachectic on HFNC, no signs of distress.   HEENT:   Normal oral mucosa, unable to perform EOM test  Neck: No JCD  Cardiovascular: Normal S1 S2, no murmur auscultated but decreased heart sounds 2/2  HFNC   Respiratory: unable to make clear assessment as pt is on HFNC and not able to comply with breathing deeply  Gastrointestinal:  Soft, Non-tender, + BS	x4  Skin: ecchymosis on lateral left knee.   Extremities: Normal range of motion, No clubbing, cyanosis or edema  Vascular: DP/PT faint B/L, extremities warm to touch, R radial 2+ without hematoma or ooze but with scattered ecchymosis, R groin without hematoma or ooze (no bruit ascultated)  Neurologic: Can perform hand squeeze on both hands and move toes on right foot but not on left foot; however did bend L knee spontaneously  Psychiatry: Unable to assess 2/2 lack of ability to verbalize                          11.2   11.11 )-----------( 105      ( 22 Mar 2020 08:16 )             34.4     03-22    141  |  107  |  28<H>  ----------------------------<  182<H>  4.1   |  27  |  0.45<L>    Ca    8.7      22 Mar 2020 08:16  Phos  1.6     03-22  Mg     2.0     03-22

## 2020-03-22 NOTE — PROGRESS NOTE ADULT - PROBLEM SELECTOR PLAN 1
ST elevation throughout all leads. STEMI protocol initiated. Pt s/p diagnostic cardiac cath 03/18/2020 with Dr Chucky Castanon showing pLAD 70%, mLAd 60-70%, pD1 80-90%. pLCX 30-40. Mild luminal irregularities in OM1 and RCA. EF 15-20%, EDP 17. Severe apical ballooning, akinesis suggestive of Takutsobo Cardiomyopathy. RHC PA 21/10 PCWP 14. PA sat 67%. AoSat 96%. CO 2.9. CI 2.16. Right radial and right groin access (manual arterial and venous removed in CCU). Pt unable to provide any history. Takutsobo most likely 2/2 AG metabolic acidosis 2/2 Starvation ketosis vs stress/emotional vs infection. pt is dry to euvolemic on exam 03/21 as per CCU resident. EKG 03/21/2020showing ST-elevation in V2-V3 and evolving ischemic changes in anterolateral leads - 2/2 Takusubo cardiomyopathy.   PLAN:  -NGT in place: c/w aspirin 81mg daily and lipitor 80mg daily  -c/w PO lopressor 12.5 mg q12 as beneficial for takutsobo w/ increased pre-load capability and reduction of tachycardic episodes to allow proper filling of ventricles  -3/20/2020 echo: Aortic valve is calcified, no aortic regurgitation, mitral valve is moderately thickened. Systolic anterior motion of the mitral valve resulting in an LVOT gradient of 62.00 mmHg and severe LVOT obstruction. Moderate mitral regurgitation. Hypertrophy of the basal septum. Left ventricular ejection fraction is 21%. Normal basal wall motion. Rest of the segments are severely hypokinetic to akinetic. Consider stress induced cardiomyopathy. Euvolemic on exam, most likely 2/2 AG metabolic acidosis 2/2 starvation ketosis vs stress/emotional vs infection.   - EKG 3/22: 1 mm ST-elevation in V3-V4 and TWI in inferolateral leads 2/2 Takusubo cardiomyopathy.   - s/p diagnostic LHC/RHC 03/18/2020 with Dr. JACOBY Castanon: pLAD 70%, mLad 60-70%, pD1 80-90%. pLCX 30-40%, OM1/RCA with mild LI,  EF 15-20%, EDP 17, severe apical ballooning, akinesis suggestive of Takutsobo CM; RHC PA 21/10 PCWP 14. PA sat 67%. AoSat 96%. CO 2.9. CI 2.16, R groin & R radial.   - Echo: 3/20/2020: MV moderately thickened, systolic anterior motion of the MV resulting in an LVOT gradient of 62.00 mmHg and severe LVOT obstruction, moderate MR, hypertrophy of the basal septum, LVEF 21%, normal basal wall motion, rest of the segments are severely hypokinetic to akinetic  -NGT in place: c/w aspirin 81 mg michaela, Lipitor 80mg daily  - c/w Lopressor 12.5 mg PO BID as beneficial for takutsobo w/ increased pre-load capability and reduction of tachycardic episodes to allow proper filling of ventricles  - No ACEi/ARB for now 2/2 SBP 90s o/n  - Strict I&Os, daily weights, core measures ordered, fluids via NGT Euvolemic on exam, most likely 2/2 AG metabolic acidosis 2/2 starvation ketosis vs stress/emotional vs infection.   - EKG 3/22: 1 mm ST-elevation in V3-V4 and TWI in inferolateral leads 2/2 Takusubo cardiomyopathy.   - s/p diagnostic LHC/RHC 03/18/2020 with Dr. JACOBY Castanon: pLAD 70%, mLad 60-70%, pD1 80-90%. pLCX 30-40%, OM1/RCA with mild LI,  EF 15-20%, EDP 17, severe apical ballooning, akinesis suggestive of Takutsobo CM; RHC PA 21/10 PCWP 14. PA sat 67%. AoSat 96%. CO 2.9. CI 2.16, R groin & R radial.   - Echo: 3/20/2020: MV moderately thickened, systolic anterior motion of the MV resulting in an LVOT gradient of 62.00 mmHg and severe LVOT obstruction, moderate MR, hypertrophy of the basal septum, LVEF 21%, normal basal wall motion, rest of the segments are severely hypokinetic to akinetic  -NGT in place: c/w aspirin 81 mg daily, Lipitor 80mg daily  - c/w Lopressor 12.5 mg PO BID as beneficial for takutsobo w/ increased pre-load capability and reduction of tachycardic episodes to allow proper filling of ventricles  - No ACEi/ARB for now 2/2 SBP 90s o/n  - Avoid preload reducing agents  - Strict I&Os, daily weights, core measures ordered, fluids via NGT Euvolemic on exam, most likely 2/2 AG metabolic acidosis 2/2 starvation ketosis vs stress/emotional vs infection.   - EKG 3/22: 1 mm ST-elevation in V3-V4 and TWI in inferolateral leads 2/2 Takusubo cardiomyopathy.   - s/p diagnostic LHC/RHC 03/18/2020 with Dr. JACOBY Castanon: pLAD 70%, mLad 60-70%, pD1 80-90%. pLCX 30-40%, OM1/RCA with mild LI,  EF 15-20%, EDP 17, severe apical ballooning, akinesis suggestive of Takutsobo CM; RHC PA 21/10 PCWP 14. PA sat 67%. AoSat 96%. CO 2.9. CI 2.16, R groin & R radial.   - Echo 3/20/2020: MV moderately thickened, systolic anterior motion of the MV resulting in an LVOT gradient of 62.00 mmHg and severe LVOT obstruction, moderate MR, hypertrophy of the basal septum, LVEF 21%, normal basal wall motion, rest of the segments are severely hypokinetic to akinetic   -NGT in place: c/w aspirin 81 mg daily, Lipitor 80mg daily  - c/w Lopressor 12.5 mg PO BID as beneficial for Takutsobo w/ increased pre-load capability and reduction of tachycardic episodes to allow proper filling of ventricles  - No ACEi/ARB for now 2/2 SBP 90s o/n  - Avoid preload reducing agents  - Palafox removed on 3/22 @ 12pm and Primafit place for strict I&Os, TOV by 6pm.   - Strict I&Os, daily weights, core measures ordered, fluids via NGT

## 2020-03-23 LAB
ALBUMIN SERPL ELPH-MCNC: 2.4 G/DL — LOW (ref 3.3–5)
ALP SERPL-CCNC: 52 U/L — SIGNIFICANT CHANGE UP (ref 40–120)
ALT FLD-CCNC: 24 U/L — SIGNIFICANT CHANGE UP (ref 10–45)
ANION GAP SERPL CALC-SCNC: 10 MMOL/L — SIGNIFICANT CHANGE UP (ref 5–17)
AST SERPL-CCNC: 35 U/L — SIGNIFICANT CHANGE UP (ref 10–40)
BASOPHILS # BLD AUTO: 0.01 K/UL — SIGNIFICANT CHANGE UP (ref 0–0.2)
BASOPHILS NFR BLD AUTO: 0.1 % — SIGNIFICANT CHANGE UP (ref 0–2)
BILIRUB SERPL-MCNC: 0.4 MG/DL — SIGNIFICANT CHANGE UP (ref 0.2–1.2)
BUN SERPL-MCNC: 22 MG/DL — SIGNIFICANT CHANGE UP (ref 7–23)
CALCIUM SERPL-MCNC: 8.8 MG/DL — SIGNIFICANT CHANGE UP (ref 8.4–10.5)
CHLORIDE SERPL-SCNC: 106 MMOL/L — SIGNIFICANT CHANGE UP (ref 96–108)
CO2 SERPL-SCNC: 29 MMOL/L — SIGNIFICANT CHANGE UP (ref 22–31)
CREAT SERPL-MCNC: 0.42 MG/DL — LOW (ref 0.5–1.3)
CULTURE RESULTS: SIGNIFICANT CHANGE UP
CULTURE RESULTS: SIGNIFICANT CHANGE UP
EOSINOPHIL # BLD AUTO: 0.05 K/UL — SIGNIFICANT CHANGE UP (ref 0–0.5)
EOSINOPHIL NFR BLD AUTO: 0.5 % — SIGNIFICANT CHANGE UP (ref 0–6)
GLUCOSE BLDC GLUCOMTR-MCNC: 125 MG/DL — HIGH (ref 70–99)
GLUCOSE BLDC GLUCOMTR-MCNC: 131 MG/DL — HIGH (ref 70–99)
GLUCOSE BLDC GLUCOMTR-MCNC: 140 MG/DL — HIGH (ref 70–99)
GLUCOSE BLDC GLUCOMTR-MCNC: 98 MG/DL — SIGNIFICANT CHANGE UP (ref 70–99)
GLUCOSE SERPL-MCNC: 141 MG/DL — HIGH (ref 70–99)
HCT VFR BLD CALC: 37.4 % — SIGNIFICANT CHANGE UP (ref 34.5–45)
HGB BLD-MCNC: 11.7 G/DL — SIGNIFICANT CHANGE UP (ref 11.5–15.5)
IMM GRANULOCYTES NFR BLD AUTO: 0.8 % — SIGNIFICANT CHANGE UP (ref 0–1.5)
LYMPHOCYTES # BLD AUTO: 0.91 K/UL — LOW (ref 1–3.3)
LYMPHOCYTES # BLD AUTO: 9.4 % — LOW (ref 13–44)
MAGNESIUM SERPL-MCNC: 1.8 MG/DL — SIGNIFICANT CHANGE UP (ref 1.6–2.6)
MCHC RBC-ENTMCNC: 29.5 PG — SIGNIFICANT CHANGE UP (ref 27–34)
MCHC RBC-ENTMCNC: 31.3 GM/DL — LOW (ref 32–36)
MCV RBC AUTO: 94.4 FL — SIGNIFICANT CHANGE UP (ref 80–100)
MONOCYTES # BLD AUTO: 0.82 K/UL — SIGNIFICANT CHANGE UP (ref 0–0.9)
MONOCYTES NFR BLD AUTO: 8.5 % — SIGNIFICANT CHANGE UP (ref 2–14)
NEUTROPHILS # BLD AUTO: 7.76 K/UL — HIGH (ref 1.8–7.4)
NEUTROPHILS NFR BLD AUTO: 80.7 % — HIGH (ref 43–77)
NRBC # BLD: 0 /100 WBCS — SIGNIFICANT CHANGE UP (ref 0–0)
PHOSPHATE SERPL-MCNC: 2 MG/DL — LOW (ref 2.5–4.5)
PLATELET # BLD AUTO: 123 K/UL — LOW (ref 150–400)
POTASSIUM SERPL-MCNC: 4.6 MMOL/L — SIGNIFICANT CHANGE UP (ref 3.5–5.3)
POTASSIUM SERPL-SCNC: 4.6 MMOL/L — SIGNIFICANT CHANGE UP (ref 3.5–5.3)
PROT SERPL-MCNC: 5.2 G/DL — LOW (ref 6–8.3)
RBC # BLD: 3.96 M/UL — SIGNIFICANT CHANGE UP (ref 3.8–5.2)
RBC # FLD: 15.6 % — HIGH (ref 10.3–14.5)
SODIUM SERPL-SCNC: 145 MMOL/L — SIGNIFICANT CHANGE UP (ref 135–145)
SPECIMEN SOURCE: SIGNIFICANT CHANGE UP
SPECIMEN SOURCE: SIGNIFICANT CHANGE UP
T4 FREE SERPL-MCNC: 0.98 NG/DL — SIGNIFICANT CHANGE UP (ref 0.7–1.48)
TSH SERPL-MCNC: 0.07 UIU/ML — LOW (ref 0.35–4.94)
WBC # BLD: 9.63 K/UL — SIGNIFICANT CHANGE UP (ref 3.8–10.5)
WBC # FLD AUTO: 9.63 K/UL — SIGNIFICANT CHANGE UP (ref 3.8–10.5)

## 2020-03-23 PROCEDURE — 99232 SBSQ HOSP IP/OBS MODERATE 35: CPT

## 2020-03-23 PROCEDURE — 99358 PROLONG SERVICE W/O CONTACT: CPT

## 2020-03-23 PROCEDURE — 71045 X-RAY EXAM CHEST 1 VIEW: CPT | Mod: 26

## 2020-03-23 RX ORDER — POTASSIUM PHOSPHATE, MONOBASIC POTASSIUM PHOSPHATE, DIBASIC 236; 224 MG/ML; MG/ML
21 INJECTION, SOLUTION INTRAVENOUS ONCE
Refills: 0 | Status: COMPLETED | OUTPATIENT
Start: 2020-03-23 | End: 2020-03-23

## 2020-03-23 RX ADMIN — Medication 12.5 MILLIGRAM(S): at 18:04

## 2020-03-23 RX ADMIN — ATORVASTATIN CALCIUM 80 MILLIGRAM(S): 80 TABLET, FILM COATED ORAL at 16:10

## 2020-03-23 RX ADMIN — Medication 3 MILLILITER(S): at 06:18

## 2020-03-23 RX ADMIN — Medication 3 MILLILITER(S): at 13:06

## 2020-03-23 RX ADMIN — POTASSIUM PHOSPHATE, MONOBASIC POTASSIUM PHOSPHATE, DIBASIC 84.5 MILLIMOLE(S): 236; 224 INJECTION, SOLUTION INTRAVENOUS at 13:07

## 2020-03-23 RX ADMIN — CEFTRIAXONE 100 MILLIGRAM(S): 500 INJECTION, POWDER, FOR SOLUTION INTRAMUSCULAR; INTRAVENOUS at 16:10

## 2020-03-23 RX ADMIN — ENOXAPARIN SODIUM 30 MILLIGRAM(S): 100 INJECTION SUBCUTANEOUS at 06:18

## 2020-03-23 RX ADMIN — Medication 81 MILLIGRAM(S): at 09:31

## 2020-03-23 RX ADMIN — Medication 12.5 MILLIGRAM(S): at 09:31

## 2020-03-23 RX ADMIN — Medication 3 MILLILITER(S): at 06:17

## 2020-03-23 RX ADMIN — Medication 1 TABLET(S): at 16:10

## 2020-03-23 RX ADMIN — Medication 3 MILLILITER(S): at 13:07

## 2020-03-23 RX ADMIN — Medication 500 MILLIGRAM(S): at 16:10

## 2020-03-23 RX ADMIN — Medication 3 MILLILITER(S): at 21:00

## 2020-03-23 NOTE — PROGRESS NOTE ADULT - PROBLEM SELECTOR PLAN 10
- CT chest 3/18: Few calcifications are seen in the breasts bilaterally.  - Refer for outpatient mammogram    Dispo: pending clinical progression  Code: Full Code  VTE ppx: Lovenox    Case d/w Dr. Stokes - CT chest 3/18: Few calcifications are seen in the breasts bilaterally.  - Refer for outpatient mammogram    Dispo: pending clinical progression; f/u palliative recs  Code: Full Code  VTE ppx: Lovenox    Case d/w Dr. Stokes

## 2020-03-23 NOTE — PROGRESS NOTE ADULT - PROBLEM SELECTOR PLAN 6
FS , HgbA1C WNL @ 5.5%, likely in setting of critical illness  - Blood glucose monitoring 4x/d, [place]

## 2020-03-23 NOTE — PROGRESS NOTE ADULT - ASSESSMENT
78 yo underweight female with FTT and unknown PMHx brought in by EMS after found down on floor for unknown period of time, found to have STEMI (in all leads) and STEMI code was activated s/p diagnostic cardiac cath 03/18/2020 with Dr Castanon revealing pLAD 70%, pD1 80-90%, no current plan for intervention, cath also revealing LVEF 15-20% with severe apical ballooning & akinesis suggestive of Takutsobo Cardiomyopathy,  pt with hypoxemic respiratory failure since admission and initial CT Chest on 3/18/2020 revealed e/o mucus plugging and small infiltrate in LLL, started on Ceftriaxone for likely post-obstructive PNA, desaturated on 03/19/2020 and CT ruled out PE but showed essentially total LLL collapse likely from mucus plugging, started on HFNC with improvement in inflation of LLL which has subsequently been titrated to 4L NC, initially with AG metabolic acidosis 2/2 starvation and hypernatremia (now resolved) but continues with improving encephalopathy possibly in setting of infection vs. FTT vs underlying dementia/other underlying neuro issue,  NGT placed as pt has lost swallow reflex for meds/nutrition, nonviolent soft wrist restraints in place B/L as pt pulled her initial NGT, stepped down to 5U for additional management. Multiple complex SW issues, has step daughter who does not want to be involved in the care of the patient, with plan to get ethics team involved (may need guardian appointed). 80 yo underweight female with FTT and unknown PMHx brought in by EMS after found down on floor for unknown period of time, found to have STEMI (in all leads) and STEMI code was activated s/p diagnostic cardiac cath 03/18/2020 with Dr Castanon revealing pLAD 70%, pD1 80-90%, no current plan for intervention, cath also revealing LVEF 15-20% with severe apical ballooning & akinesis suggestive of Takutsobo Cardiomyopathy,  pt with hypoxemic respiratory failure since admission and initial CT Chest on 3/18/2020 revealed e/o mucus plugging and small infiltrate in LLL, started on Ceftriaxone for likely post-obstructive PNA, desaturated on 03/19/2020 and CT ruled out PE but showed essentially total LLL collapse likely from mucus plugging, started on HFNC with improvement in inflation of LLL which has subsequently been titrated to 3L NC, initially with AG metabolic acidosis 2/2 starvation and hypernatremia (now resolved) but continues with improving encephalopathy possibly in setting of infection vs. FTT vs underlying dementia/other underlying neuro issue,  NGT placed as pt has lost swallow reflex for meds/nutrition, nonviolent soft wrist restraints in place B/L as pt pulled her initial NGT, stepped down to 5U for additional management. Multiple complex SW issues, has step daughter who does not want to be involved in the care of the patient, with plan to get ethics team involved (may need guardian appointed). Palliative team consulted for further dispo planning (possible hospice).     Pt. to be reevaluated tomorrow again for NGT; will remove non-violent soft restraint then and have PT evaluate pt. too.

## 2020-03-23 NOTE — PROGRESS NOTE ADULT - SUBJECTIVE AND OBJECTIVE BOX
Interventional Cardiology PA Adult Progress Note    CC: STEMI   Subjective Assessment: Pt. seen and examined at bedside today am. Pt. not really responding verbally; ROS unable to be obtained.    	  MEDICATIONS:  metoprolol tartrate 12.5 milliGRAM(s) Oral every 12 hours    cefTRIAXone   IVPB 1000 milliGRAM(s) IV Intermittent every 24 hours    acetylcysteine 10%  Inhalation 3 milliLiter(s) Inhalation three times a day  albuterol/ipratropium for Nebulization. 3 milliLiter(s) Nebulizer three times a day        atorvastatin 80 milliGRAM(s) Oral every 24 hours  dextrose 40% Gel 15 Gram(s) Oral once PRN  dextrose 50% Injectable 12.5 Gram(s) IV Push once  dextrose 50% Injectable 25 Gram(s) IV Push once  dextrose 50% Injectable 25 Gram(s) IV Push once  glucagon  Injectable 1 milliGRAM(s) IntraMuscular once PRN  insulin lispro (HumaLOG) corrective regimen sliding scale   SubCutaneous Before meals and at bedtime    ascorbic acid 500 milliGRAM(s) Oral every 24 hours  aspirin  chewable 81 milliGRAM(s) Enteral Tube daily  dextrose 5%. 1000 milliLiter(s) IV Continuous <Continuous>  enoxaparin Injectable 30 milliGRAM(s) SubCutaneous every 24 hours  multivitamin 1 Tablet(s) Oral every 24 hours      	    [PHYSICAL EXAM:  TELEMETRY:  T(C): 36.5 (03-23-20 @ 08:47), Max: 36.8 (03-22-20 @ 18:42)  HR: 87 (03-23-20 @ 08:18) (68 - 96)  BP: 110/55 (03-23-20 @ 08:18) (93/54 - 127/65)  RR: 18 (03-23-20 @ 08:18) (18 - 20)  SpO2: 97% (03-23-20 @ 08:18) (97% - 100%)  Wt(kg): --  I&O's Summary    22 Mar 2020 07:01  -  23 Mar 2020 07:00  --------------------------------------------------------  IN: 1440 mL / OUT: 250 mL / NET: 1190 mL        Palafox:  Central/PICC/Mid Line:                                             Appearance: cachectic on HFNC, no signs of distress.   HEENT:   Normal oral mucosa, unable to perform EOM test  Neck: No JCD  Cardiovascular: Normal S1 S2, no murmur auscultated  Respiratory: decreased breath sounds b/l base L> R  Gastrointestinal:  Soft, Non-tender, + BS	x4  Skin: ecchymosis on lateral left knee.   Extremities: Normal range of motion, No clubbing, cyanosis or edema  Vascular: DP/PT faint B/L, extremities warm to touch, R radial 2+ without hematoma or ooze but with scattered ecchymosis, R groin without hematoma or ooze (no bruit ascultated)  Neurologic: Can perform hand squeeze on both hands and move toes on right foot but not on left foot; however did bend L knee spontaneously  Psychiatry: Unable to assess 2/2 lack of ability to verbalize  	    ECG:  	  RADIOLOGY:   DIAGNOSTIC TESTING:  [ ] Echocardiogram:  [ ]  Catheterization:  [ ] Stress Test:    [ ] INA  OTHER: 	    LABS:	 	  CARDIAC MARKERS:                                  11.7   9.63  )-----------( 123      ( 23 Mar 2020 07:01 )             37.4     03-23    145  |  106  |  22  ----------------------------<  141<H>  4.6   |  29  |  0.42<L>    Ca    8.8      23 Mar 2020 07:01  Phos  2.0     03-23  Mg     1.8     03-23    TPro  5.2<L>  /  Alb  2.4<L>  /  TBili  0.4  /  DBili  x   /  AST  35  /  ALT  24  /  AlkPhos  52  03-23    proBNP:   Lipid Profile:   HgA1c:   TSH: Thyroid Stimulating Hormone, Serum: 0.072 uIU/mL (03-23 @ 07:01)        ASSESSMENT/PLAN: 	        DVT ppx:  Dispo: Interventional Cardiology PA Adult Progress Note    CC: STEMI   Subjective Assessment: Pt. seen and examined at bedside today am. Pt. not really responding verbally.     ROS unable to be obtained.    	  MEDICATIONS:  metoprolol tartrate 12.5 milliGRAM(s) Oral every 12 hours    cefTRIAXone   IVPB 1000 milliGRAM(s) IV Intermittent every 24 hours    acetylcysteine 10%  Inhalation 3 milliLiter(s) Inhalation three times a day  albuterol/ipratropium for Nebulization. 3 milliLiter(s) Nebulizer three times a day        atorvastatin 80 milliGRAM(s) Oral every 24 hours  dextrose 40% Gel 15 Gram(s) Oral once PRN  dextrose 50% Injectable 12.5 Gram(s) IV Push once  dextrose 50% Injectable 25 Gram(s) IV Push once  dextrose 50% Injectable 25 Gram(s) IV Push once  glucagon  Injectable 1 milliGRAM(s) IntraMuscular once PRN  insulin lispro (HumaLOG) corrective regimen sliding scale   SubCutaneous Before meals and at bedtime    ascorbic acid 500 milliGRAM(s) Oral every 24 hours  aspirin  chewable 81 milliGRAM(s) Enteral Tube daily  dextrose 5%. 1000 milliLiter(s) IV Continuous <Continuous>  enoxaparin Injectable 30 milliGRAM(s) SubCutaneous every 24 hours  multivitamin 1 Tablet(s) Oral every 24 hours      	    [PHYSICAL EXAM:  TELEMETRY:  T(C): 36.5 (03-23-20 @ 08:47), Max: 36.8 (03-22-20 @ 18:42)  HR: 87 (03-23-20 @ 08:18) (68 - 96)  BP: 110/55 (03-23-20 @ 08:18) (93/54 - 127/65)  RR: 18 (03-23-20 @ 08:18) (18 - 20)  SpO2: 97% (03-23-20 @ 08:18) (97% - 100%)  Wt(kg): --  I&O's Summary    22 Mar 2020 07:01  -  23 Mar 2020 07:00  --------------------------------------------------------  IN: 1440 mL / OUT: 250 mL / NET: 1190 mL        Palafox:  Central/PICC/Mid Line:                                             Appearance: cachectic on HFNC, no signs of distress.   HEENT:   Normal oral mucosa, unable to perform EOM test  Neck: No JCD  Cardiovascular: Normal S1 S2, no murmur auscultated  Respiratory: decreased breath sounds b/l base L> R  Gastrointestinal:  Soft, Non-tender, + BS	x4  Skin: ecchymosis on lateral left knee.   Extremities: Normal range of motion, No clubbing, cyanosis or edema  Vascular: DP/PT faint B/L, extremities warm to touch, R radial 2+ without hematoma or ooze but with scattered ecchymosis, R groin without hematoma or ooze (no bruit ascultated)  Neurologic: Can perform hand squeeze on both hands and move toes on right foot but not on left foot; however did bend L knee spontaneously  Psychiatry: Unable to assess 2/2 lack of ability to verbalize  	    ECG:  	  RADIOLOGY:   DIAGNOSTIC TESTING:  [ ] Echocardiogram:  [ ]  Catheterization:  [ ] Stress Test:    [ ] INA  OTHER: 	    LABS:	 	  CARDIAC MARKERS:                                  11.7   9.63  )-----------( 123      ( 23 Mar 2020 07:01 )             37.4     03-23    145  |  106  |  22  ----------------------------<  141<H>  4.6   |  29  |  0.42<L>    Ca    8.8      23 Mar 2020 07:01  Phos  2.0     03-23  Mg     1.8     03-23    TPro  5.2<L>  /  Alb  2.4<L>  /  TBili  0.4  /  DBili  x   /  AST  35  /  ALT  24  /  AlkPhos  52  03-23    proBNP:   Lipid Profile:   HgA1c:   TSH: Thyroid Stimulating Hormone, Serum: 0.072 uIU/mL (03-23 @ 07:01)        ASSESSMENT/PLAN: 	        DVT ppx:  Dispo:

## 2020-03-23 NOTE — PROGRESS NOTE ADULT - PROBLEM SELECTOR PLAN 3
R/T LLL collapse and likely post-obstructive PNA, HFNC transitioned to 4L humidified NC on 3/22 AM and saturating well.   - RVP negative  - CTPE 3/20: r/o PE, but did show LLL collapse with mucus plugging and likely post-obstructive PNA. Cannot r/o endobronchial mass on CT PE, but less likely, poor candidate for invasive w/u  - CXR 3/22: without LLL collapse, small left effusion, resolution L lung infiltrates as described  - Urine Legionella negative  - Send sputum cytology if she produces sputum  - Pulm consulted, appreciate recs including chest PT, mucomyst, aspiration precautions, frequent suctioning, aggressive pulmonary hygiene  - Ceftriaxone 1g IV QD (initiated 3/21) to complete 5 day course (day 3/5)  - Hold off on azithromycin due to prolonged QTc and low suspicion for atypical cause of PNA  - NGT and aspiration precautions in place

## 2020-03-23 NOTE — PHYSICAL THERAPY INITIAL EVALUATION ADULT - GENERAL OBSERVATIONS, REHAB EVAL
patient received semi-supine with no acute distress. +EKG +NGT +NC 2L/min +zflows +primafit +heplock

## 2020-03-23 NOTE — PHYSICAL THERAPY INITIAL EVALUATION ADULT - PERTINENT HX OF CURRENT PROBLEM, REHAB EVAL
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 Now w/ improving metabolic encepholopathy, hypernatremia (resolved), AG Metabolic acidosis 2/2 starvation ketosis (resolved), and failure to thrive.

## 2020-03-23 NOTE — PHYSICAL THERAPY INITIAL EVALUATION ADULT - ADDITIONAL COMMENTS
Per care coordination note- patient lives alone in elevator apt, ambulated with rollator independently

## 2020-03-23 NOTE — PROGRESS NOTE ADULT - PROBLEM SELECTOR PLAN 4
Verbalizing one word repeatedly, able to squeeze B/L UE and wiggle R toes when prompted, unable to perform EOM test, improving  -  Most likely 2/2 behavioral (recent hx of APS case involvement, reported to unkept at home) vs infection (PNA), Other etiologies that are less likely include wernicke/korsakoff (no nystagmus on exam) vs underlying dementia (CTH=microvascular ischemic changes + parenchymal volume loss) vs stroke (moving all extremities spontaneously)  - Utox & blood alcohol negative on admit  - Bcx NGTD, lactate cleared  - c/w Thiamine for possible wernicke  - Continue to hold off on MRI/neuro consult for now as appears to be improving d/w Dr. Day/Kike

## 2020-03-23 NOTE — PROGRESS NOTE ADULT - PROBLEM SELECTOR PLAN 7
Unclear as to why pt can't initiate swallowing reflex, most likely 2/2 encephalopathy  - SLP evaluated pt on 3/22 and diet advanced to pureed diet w/ thin liquids with supplemental TFs   - Requires 100% supervision/feeding assistance & extra time to swallow. Cue to swallow as needed. Ensure oral cavity is clear prior to administering next bite or sip, upright position during and 30 min. follow meals. Misc orders placed.   - c/w wrist restraints for now as she had pulled of mittens and NGT previously Unclear as to why pt can't initiate swallowing reflex, most likely 2/2 encephalopathy  - SLP evaluated pt on 3/22 and diet advanced to pureed diet w/ thin liquids with supplemental TFs   - Requires 100% supervision/feeding assistance & extra time to swallow. Cue to swallow as needed. Ensure oral cavity is clear prior to administering next bite or sip, upright position during and 30 min. follow meals. Misc orders placed.   - c/w wrist restraints for now as she had pulled of mittens and NGT previously  - plan to reeval mariely again for continuation of NGT

## 2020-03-23 NOTE — CHART NOTE - NSCHARTNOTEFT_GEN_A_CORE
PALLIATIVE MEDICINE COORDINATION OF CARE NOTE FOR LORA PINEDA  [  ] ED trigger    [  ] MICU trigger    [ x ] Consult      Patient will be seen as full consult within 24h. Consult requested for: ____GOC____    ___30___ Minutes; Start: __11am___  End: __1130__, of non-face-to-face prolonged service provided that relates to (face-to-face) care that has or will occur and ongoing patient management, including one or more of the following:     - Reviewed records from other physicians or other health care professional services, including one or more of the following: other medical records and diagnostic / radiology study results     HPI:  HPI: 78 yo F unknown PMH brought in by EMS w/ STEMI. Pt taken straight to cath lab on arrival. Pt was not able to provide hx, and no other hx provided. Pt was put on non-rebreather mask by EMS and taken straight to cath lab. There were no contacts listed or documented home address. Home address was written as hospital address by EMS. No home or family numbers listed.  Vitals: 96.4 F, , 162/96--->90/54, RR 15-20, spo2=97-98 on non-rebreather (15L/min)  Labs drawn during Cath: WBC 14.7, lymphocytes 0.78 (low), Hgb 15.9, INR=1.47, Na 149, K=3.1, HCO3-18, AG=24, BUN=62, Ca 10.7, AST=65, lactate dehydrogenase 383, troponin 1.10, creatine kinase 193, ABG PH=7.39 PO2=93 PCO2=34   Imaging: CXR neg for infiltrate, however hyperinflated (18 Mar 2020 19:15)      - Other: iStop reviewed.    NO Rx found on iStop review. Ref #:     - Other: Medication reviewed.    The patient HAS NOT used PRN's in the last 24h.    MEDICATIONS  (STANDING):  acetylcysteine 10%  Inhalation 3 milliLiter(s) Inhalation three times a day  albuterol/ipratropium for Nebulization. 3 milliLiter(s) Nebulizer three times a day  ascorbic acid 500 milliGRAM(s) Oral every 24 hours  aspirin  chewable 81 milliGRAM(s) Enteral Tube daily  atorvastatin 80 milliGRAM(s) Oral every 24 hours  cefTRIAXone   IVPB 1000 milliGRAM(s) IV Intermittent every 24 hours  dextrose 5%. 1000 milliLiter(s) (50 mL/Hr) IV Continuous <Continuous>  dextrose 50% Injectable 12.5 Gram(s) IV Push once  dextrose 50% Injectable 25 Gram(s) IV Push once  dextrose 50% Injectable 25 Gram(s) IV Push once  enoxaparin Injectable 30 milliGRAM(s) SubCutaneous every 24 hours  insulin lispro (HumaLOG) corrective regimen sliding scale   SubCutaneous Before meals and at bedtime  metoprolol tartrate 12.5 milliGRAM(s) Oral every 12 hours  multivitamin 1 Tablet(s) Oral every 24 hours    MEDICATIONS  (PRN):  dextrose 40% Gel 15 Gram(s) Oral once PRN Blood Glucose LESS THAN 70 milliGRAM(s)/deciliter  glucagon  Injectable 1 milliGRAM(s) IntraMuscular once PRN Glucose LESS THAN 70 milligrams/deciliter      - Other: Advanced directives  Patient currently full code       - Other: Coordination/Plan of care     Discussed with primary team    Patient primary team will need to have GOC discussion and documentation prior to palliative contact. Symptomatic crisis and management take priority as well as actively dying patients. GOC conversations will be over the phone with documented proxy's, direct patient contact to be limited. Patient does have EF of 15-20% that qualifies her for hospice. PALLIATIVE MEDICINE COORDINATION OF CARE NOTE FOR LORA PINEDA  [  ] ED trigger    [  ] MICU trigger    [ x ] Consult      Patient will be seen as full consult within 24h. Consult requested for: ____GOC____    ___30___ Minutes; Start: __11am___  End: __1130__, of non-face-to-face prolonged service provided that relates to (face-to-face) care that has or will occur and ongoing patient management, including one or more of the following:     - Reviewed records from other physicians or other health care professional services, including one or more of the following: other medical records and diagnostic / radiology study results     HPI:  HPI: 78 yo F unknown PMH brought in by EMS w/ STEMI. Pt taken straight to cath lab on arrival. Pt was not able to provide hx, and no other hx provided. Pt was put on non-rebreather mask by EMS and taken straight to cath lab. There were no contacts listed or documented home address. Home address was written as hospital address by EMS. No home or family numbers listed.  Vitals: 96.4 F, , 162/96--->90/54, RR 15-20, spo2=97-98 on non-rebreather (15L/min)  Labs drawn during Cath: WBC 14.7, lymphocytes 0.78 (low), Hgb 15.9, INR=1.47, Na 149, K=3.1, HCO3-18, AG=24, BUN=62, Ca 10.7, AST=65, lactate dehydrogenase 383, troponin 1.10, creatine kinase 193, ABG PH=7.39 PO2=93 PCO2=34   Imaging: CXR neg for infiltrate, however hyperinflated (18 Mar 2020 19:15)      - Other: iStop reviewed.    NO Rx found on iStop review. Ref #:  [id number]    - Other: Medication reviewed.    The patient HAS NOT used PRN's in the last 24h.    MEDICATIONS  (STANDING):  acetylcysteine 10%  Inhalation 3 milliLiter(s) Inhalation three times a day  albuterol/ipratropium for Nebulization. 3 milliLiter(s) Nebulizer three times a day  ascorbic acid 500 milliGRAM(s) Oral every 24 hours  aspirin  chewable 81 milliGRAM(s) Enteral Tube daily  atorvastatin 80 milliGRAM(s) Oral every 24 hours  cefTRIAXone   IVPB 1000 milliGRAM(s) IV Intermittent every 24 hours  dextrose 5%. 1000 milliLiter(s) (50 mL/Hr) IV Continuous <Continuous>  dextrose 50% Injectable 12.5 Gram(s) IV Push once  dextrose 50% Injectable 25 Gram(s) IV Push once  dextrose 50% Injectable 25 Gram(s) IV Push once  enoxaparin Injectable 30 milliGRAM(s) SubCutaneous every 24 hours  insulin lispro (HumaLOG) corrective regimen sliding scale   SubCutaneous Before meals and at bedtime  metoprolol tartrate 12.5 milliGRAM(s) Oral every 12 hours  multivitamin 1 Tablet(s) Oral every 24 hours    MEDICATIONS  (PRN):  dextrose 40% Gel 15 Gram(s) Oral once PRN Blood Glucose LESS THAN 70 milliGRAM(s)/deciliter  glucagon  Injectable 1 milliGRAM(s) IntraMuscular once PRN Glucose LESS THAN 70 milligrams/deciliter      - Other: Advanced directives  Patient currently full code       - Other: Coordination/Plan of care     Discussed with primary team    Patient primary team will need to have GOC discussion and documentation prior to palliative contact. Symptomatic crisis and management take priority as well as actively dying patients. GOC conversations will be over the phone with documented proxy's, direct patient contact to be limited. Patient does have EF of 15-20% that qualifies her for hospice.

## 2020-03-23 NOTE — CHART NOTE - NSCHARTNOTEFT_GEN_A_CORE
Admitting Diagnosis:   Patient is a 79y old  Female who presents with a chief complaint of STEMI (22 Mar 2020 10:08)      PAST MEDICAL & SURGICAL HISTORY:      Current Nutrition Order: Jevity 1.2 @ 40ml/hr x 24hrs to provide 960ml TV, 1152kcal, 53g pro, 775ml h2o    - feeds currently off     PO Intake: Good (%) [   ]  Fair (50-75%) [   ] Poor (<25%) [   ]- had applesauce this AM per RN     GI Issues: no noted n/v/d/c per report    Pain: does not appear to be in pain     Skin Integrity: surgical incision, ricardo 12, x6 pressure ulcers per flow sheet: suspected DTI to L buttocks/ R shoulder, B/L heels    Labs:       145  |  106  |  22  ----------------------------<  141<H>  4.6   |  29  |  0.42<L>    Ca    8.8      23 Mar 2020 07:01  Phos  2.0       Mg     1.8         TPro  5.2<L>  /  Alb  2.4<L>  /  TBili  0.4  /  DBili  x   /  AST  35  /  ALT  24  /  AlkPhos  52      CAPILLARY BLOOD GLUCOSE      POCT Blood Glucose.: 140 mg/dL (23 Mar 2020 06:35)  POCT Blood Glucose.: 157 mg/dL (22 Mar 2020 22:03)  POCT Blood Glucose.: 160 mg/dL (22 Mar 2020 19:29)  POCT Blood Glucose.: 172 mg/dL (22 Mar 2020 11:56)      Medications:  MEDICATIONS  (STANDING):  acetylcysteine 10%  Inhalation 3 milliLiter(s) Inhalation three times a day  albuterol/ipratropium for Nebulization. 3 milliLiter(s) Nebulizer three times a day  ascorbic acid 500 milliGRAM(s) Oral every 24 hours  aspirin  chewable 81 milliGRAM(s) Enteral Tube daily  atorvastatin 80 milliGRAM(s) Oral every 24 hours  cefTRIAXone   IVPB 1000 milliGRAM(s) IV Intermittent every 24 hours  dextrose 5%. 1000 milliLiter(s) (50 mL/Hr) IV Continuous <Continuous>  dextrose 50% Injectable 12.5 Gram(s) IV Push once  dextrose 50% Injectable 25 Gram(s) IV Push once  dextrose 50% Injectable 25 Gram(s) IV Push once  enoxaparin Injectable 30 milliGRAM(s) SubCutaneous every 24 hours  insulin lispro (HumaLOG) corrective regimen sliding scale   SubCutaneous Before meals and at bedtime  metoprolol tartrate 12.5 milliGRAM(s) Oral every 12 hours  multivitamin 1 Tablet(s) Oral every 24 hours    MEDICATIONS  (PRN):  dextrose 40% Gel 15 Gram(s) Oral once PRN Blood Glucose LESS THAN 70 milliGRAM(s)/deciliter  glucagon  Injectable 1 milliGRAM(s) IntraMuscular once PRN Glucose LESS THAN 70 milligrams/deciliter      Weight:  35.5kg (3/18)    Daily Weight in k.6 (23 Mar 2020 06:13)    Weight Change: +.1kg since admission     Nutrition Focused Physical Exam: Completed [x ]  Not Pertinent [   ]  Muscle Wasting- Temporal [x-moderate   ]  Clavicle/Pectoral [x-severe   ]  Shoulder/Deltoid [x-severe   ]  Scapula [   ]  Interosseous [   ]  Quadriceps [   ]  Gastrocnemius [   ]  Fat Wasting- Orbital [x-moderate   ]  Buccal [x- severe   ]  Triceps [x- severe   ]  Rib [   ]  Suspect severe PCM 2/2 to physical assessment and suspected poor PO intake, suspected wt loss, BMI 15.8; please see malnutrition chart note.    Estimated energy needs:   Ideal body weight used for calculations as pt wt continues to vary d/t CHF/ fluid retention   ABW 35.5kg, IBW 44.5kg, 80% IBW, ht 58", BMI 15.8  Nutrient needs based on [facility] standards of care for maintenance in adults, adjusted for CHF, multiple pressure ulcers, fluid per team   1110-1332kcal (25-30kcal/kg)   53-62g pro (1.2-1.4g/kg pro)     Subjective:   79F unknown PMH brought in by EMS w/ STEMI. Pt taken straight to cath lab on arrival. Pt was not able to provide hx, and no other hx provided. Pt was put on non-rebreather mask by EMS and taken straight to cath lab. There were no contacts listed or documented home address. Home address was written as hospital address by EMS. No home or family numbers listed. Pt remained AAOx0-1, non verbal, now noted to speak sentence this AM to RN however unable to do so for writer. Unable to obtain wt hx or PO intake PTA. On exam pt cachectic appearing, see chart note for malnutrition. Pt failed SLP eval on admit, unable to swallow likely d/t encephalopathy. Feeds held this AM pending additional SLP eval as per RN was able to take applesauce this AM. Placed on restraints d/t pulling NGT, plan to remove if pass SLP eval. If unable to pass swallow eval recommend continue with current TF order to best meet pt needs. Per PA in IDR pt with no HCP/ caretaker, dispo pending this. Will continue to follow per protocol and align nutrition with GOC.     Previous Nutrition Diagnosis: Inadequate energy intake r/t intake < EER AEB NPO     Active [   ]  Resolved [  x ]    If resolved, new PES: Severe malnutrition in setting of suspected chronic illness r/t FTT AEB NFPE findings     Goal: show no further s/sx of malnutrition, meet >75% EER via preferred route     Recommendations:  1. C/w NGT feeds of Jevity 1.2 @ 40ml/hr x 24hrs to provide 960ml TV, 1152kcal, 53g pro, 775ml h2o if unable to pass SLP eval   2. Aspiration precautions, diet per SLP   3. Manage pain prn   4. Monitor and replete lytes     Education: n/a mental status     Risk Level: High [ x  ] Moderate [   ] Low [   ] Admitting Diagnosis:   Patient is a 79y old  Female who presents with a chief complaint of STEMI (22 Mar 2020 10:08)      PAST MEDICAL & SURGICAL HISTORY:      Current Nutrition Order:   Pureed, thin liquids +   Jevity 1.2 @ 40ml/hr x 24hrs to provide 960ml TV, 1152kcal, 53g pro, 775ml h2o    - feeds currently off     PO Intake: Good (%) [   ]  Fair (50-75%) [   ] Poor (<25%) [ x ]- had applesauce this AM per RN     GI Issues: no noted n/v/d/c per report    Pain: does not appear to be in pain     Skin Integrity: surgical incision, ricardo 12, x6 pressure ulcers per flow sheet: suspected DTI to L buttocks/ R shoulder, B/L heels    Labs:       145  |  106  |  22  ----------------------------<  141<H>  4.6   |  29  |  0.42<L>    Ca    8.8      23 Mar 2020 07:01  Phos  2.0       Mg     1.8         TPro  5.2<L>  /  Alb  2.4<L>  /  TBili  0.4  /  DBili  x   /  AST  35  /  ALT  24  /  AlkPhos  52      CAPILLARY BLOOD GLUCOSE      POCT Blood Glucose.: 140 mg/dL (23 Mar 2020 06:35)  POCT Blood Glucose.: 157 mg/dL (22 Mar 2020 22:03)  POCT Blood Glucose.: 160 mg/dL (22 Mar 2020 19:29)  POCT Blood Glucose.: 172 mg/dL (22 Mar 2020 11:56)      Medications:  MEDICATIONS  (STANDING):  acetylcysteine 10%  Inhalation 3 milliLiter(s) Inhalation three times a day  albuterol/ipratropium for Nebulization. 3 milliLiter(s) Nebulizer three times a day  ascorbic acid 500 milliGRAM(s) Oral every 24 hours  aspirin  chewable 81 milliGRAM(s) Enteral Tube daily  atorvastatin 80 milliGRAM(s) Oral every 24 hours  cefTRIAXone   IVPB 1000 milliGRAM(s) IV Intermittent every 24 hours  dextrose 5%. 1000 milliLiter(s) (50 mL/Hr) IV Continuous <Continuous>  dextrose 50% Injectable 12.5 Gram(s) IV Push once  dextrose 50% Injectable 25 Gram(s) IV Push once  dextrose 50% Injectable 25 Gram(s) IV Push once  enoxaparin Injectable 30 milliGRAM(s) SubCutaneous every 24 hours  insulin lispro (HumaLOG) corrective regimen sliding scale   SubCutaneous Before meals and at bedtime  metoprolol tartrate 12.5 milliGRAM(s) Oral every 12 hours  multivitamin 1 Tablet(s) Oral every 24 hours    MEDICATIONS  (PRN):  dextrose 40% Gel 15 Gram(s) Oral once PRN Blood Glucose LESS THAN 70 milliGRAM(s)/deciliter  glucagon  Injectable 1 milliGRAM(s) IntraMuscular once PRN Glucose LESS THAN 70 milligrams/deciliter      Weight:  35.5kg (3/18)    Daily Weight in k.6 (23 Mar 2020 06:13)    Weight Change: +.1kg since admission     Nutrition Focused Physical Exam: Completed [x ]  Not Pertinent [   ]  Muscle Wasting- Temporal [x-moderate   ]  Clavicle/Pectoral [x-severe   ]  Shoulder/Deltoid [x-severe   ]  Scapula [   ]  Interosseous [   ]  Quadriceps [   ]  Gastrocnemius [   ]  Fat Wasting- Orbital [x-moderate   ]  Buccal [x- severe   ]  Triceps [x- severe   ]  Rib [   ]  Suspect severe PCM 2/2 to physical assessment and suspected poor PO intake, suspected wt loss, BMI 15.8; please see malnutrition chart note.    Estimated energy needs:   Ideal body weight used for calculations as pt wt continues to vary d/t CHF/ fluid retention   ABW 35.5kg, IBW 44.5kg, 80% IBW, ht 58", BMI 15.8  Nutrient needs based on [facility] standards of care for maintenance in adults, adjusted for CHF, multiple pressure ulcers, fluid per team   1110-1332kcal (25-30kcal/kg)   53-62g pro (1.2-1.4g/kg pro)     Subjective:   79F unknown PMH brought in by EMS w/ STEMI. Pt taken straight to cath lab on arrival. Pt was not able to provide hx, and no other hx provided. Pt was put on non-rebreather mask by EMS and taken straight to cath lab. There were no contacts listed or documented home address. Home address was written as hospital address by EMS. No home or family numbers listed. Pt remained AAOx0-1, non verbal, now noted to speak sentence this AM to RN however unable to do so for writer. Unable to obtain wt hx or PO intake PTA. On exam pt cachectic appearing, see chart note for malnutrition. Pt failed SLP eval on admit, unable to swallow likely d/t encephalopathy. Feeds held this AM pending additional SLP eval as per RN was able to take applesauce this AM. Placed on restraints d/t pulling NGT, plan to remove if pass SLP eval. If unable to pass swallow eval recommend continue with current TF order to best meet pt needs. Per PA in IDR pt with no HCP/ caretaker, dispo pending this. Will continue to follow per protocol and align nutrition with GOC.     Previous Nutrition Diagnosis: Inadequate energy intake r/t intake < EER AEB NPO     Active [   ]  Resolved [  x ]    If resolved, new PES: Severe malnutrition in setting of suspected chronic illness r/t FTT AEB NFPE findings     Goal: show no further s/sx of malnutrition, meet >75% EER via preferred route     Recommendations:  1. C/w NGT feeds of Jevity 1.2 @ 40ml/hr x 24hrs to provide 960ml TV, 1152kcal, 53g pro, 775ml h2o if unable to pass SLP eval   2. Aspiration precautions, diet per SLP   3. Manage pain prn   4. Monitor and replete lytes     Education: n/a mental status     Risk Level: High [ x  ] Moderate [   ] Low [   ]

## 2020-03-23 NOTE — PROGRESS NOTE ADULT - PROBLEM SELECTOR PLAN 8
Cachectic, underweight, anorexic BMI=15.8, multiple pressure ulcers on body, RD/ SLP/SW following  - Most likely has not been taken care of, no recorded home address or family contacts.   - Puree w/ thin liquids diet initiated on 3/22 as per SLP eval with supplemental TFs. c/w Jevity 1.2 via NGT @ 40 cc/hr.   - Calorie count ordered and to be complete by dietitian prior to removing NGT d/t concerns pt may not meet nutritional needs.   - Monitor electrolytes (including Phos) closely for refeeding syndrome and replete PRN.

## 2020-03-23 NOTE — CHART NOTE - NSCHARTNOTEFT_GEN_A_CORE
Upon Nutritional Assessment by the Registered Dietitian your patient was determined to meet criteria / has evidence of the following diagnosis/diagnoses:          [ ]  Mild Protein Calorie Malnutrition        [ ]  Moderate Protein Calorie Malnutrition        [x ] Severe Protein Calorie Malnutrition        [ ] Unspecified Protein Calorie Malnutrition        [x ] Underweight / BMI <19        [ ] Morbid Obesity / BMI > 40    Nutrition Focused Physical Exam: Completed [x ]  Not Pertinent [   ]  Muscle Wasting- Temporal [x-moderate   ]  Clavicle/Pectoral [x-severe   ]  Shoulder/Deltoid [x-severe   ]  Scapula [   ]  Interosseous [   ]  Quadriceps [   ]  Gastrocnemius [   ]  Fat Wasting- Orbital [x-moderate   ]  Buccal [x- severe   ]  Triceps [x- severe   ]  Rib [   ]  Suspect severe PCM 2/2 to physical assessment and suspected poor PO intake, suspected wt loss, BMI 15.8; please see malnutrition chart note.    Findings as based on:  •  Comprehensive nutrition assessment and consultation      Treatment:    The following diet has been recommended:  1. C/w NGT feeds of Jevity 1.2 @ 40ml/hr x 24hrs to provide 960ml TV, 1152kcal, 53g pro, 775ml h2o if unable to pass SLP eval/ meet needs via PO diet   2. Aspiration precautions, diet per SLP   3. Manage pain prn   4. Monitor and replete lytes     PROVIDER Section:     By signing this assessment you are acknowledging and agree with the diagnosis/diagnoses assigned by the Registered Dietitian    Comments:

## 2020-03-23 NOTE — PROGRESS NOTE ADULT - PROBLEM SELECTOR PLAN 9
Large thyroid goiter found on CT chest, asymmetrical multinodular goiter seen on CTPE.  - Low TSH=.13, and low T3=56. f/u repeat AM TFTs.  May be sick thyroid in setting of active medical problems.   - US Thyroid & Parathyroid 3/19: 2 nodules in L thyroid meeting criteria for FNA  - Pt to follow up as outpt for further workup

## 2020-03-23 NOTE — PROGRESS NOTE ADULT - PROBLEM SELECTOR PLAN 5
Platelets 201>175>126>107>105>141  - Stabilizing over past 2 days, likely in setting of infection  - c/w Lovenox for now and continue to monitor as d/w Dr. Day/Kike

## 2020-03-23 NOTE — PROGRESS NOTE ADULT - PROBLEM SELECTOR PLAN 1
Euvolemic on exam, most likely 2/2 AG metabolic acidosis 2/2 starvation ketosis vs stress/emotional vs infection.   - EKG 3/22: 1 mm ST-elevation in V3-V4 and TWI in inferolateral leads 2/2 Takusubo cardiomyopathy.   - s/p diagnostic LHC/RHC 03/18/2020 with Dr. JACOBY Castanon: pLAD 70%, mLad 60-70%, pD1 80-90%. pLCX 30-40%, OM1/RCA with mild LI,  EF 15-20%, EDP 17, severe apical ballooning, akinesis suggestive of Takutsobo CM; RHC PA 21/10 PCWP 14. PA sat 67%. AoSat 96%. CO 2.9. CI 2.16, R groin & R radial.   - Echo 3/20/2020: MV moderately thickened, systolic anterior motion of the MV resulting in an LVOT gradient of 62.00 mmHg and severe LVOT obstruction, moderate MR, hypertrophy of the basal septum, LVEF 21%, normal basal wall motion, rest of the segments are severely hypokinetic to akinetic   -NGT in place: c/w aspirin 81 mg daily, Lipitor 80mg daily  - c/w Lopressor 12.5 mg PO BID as beneficial for Takutsobo w/ increased pre-load capability and reduction of tachycardic episodes to allow proper filling of ventricles  - No ACEi/ARB for now 2/2 SBP 90s o/n  - Avoid preload reducing agents  - Palafox removed on 3/22 @ 12pm and Primafit place for strict I&Os, bladder scan q6hrs  - Strict I&Os, daily weights, core measures ordered, fluids via NGT Euvolemic on exam, most likely 2/2 AG metabolic acidosis 2/2 starvation ketosis vs stress/emotional vs infection.   - EKG 3/22: 1 mm ST-elevation in V3-V4 and TWI in inferolateral leads 2/2 Takusubo cardiomyopathy.   - s/p diagnostic LHC/RHC 03/18/2020 with Dr. JACOBY Castanon: pLAD 70%, mLad 60-70%, pD1 80-90%. pLCX 30-40%, OM1/RCA with mild LI,  EF 15-20%, EDP 17, severe apical ballooning, akinesis suggestive of Takutsobo CM; RHC PA 21/10 PCWP 14. PA sat 67%. AoSat 96%. CO 2.9. CI 2.16, R groin & R radial.   - Echo 3/20/2020: MV moderately thickened, systolic anterior motion of the MV resulting in an LVOT gradient of 62.00 mmHg and severe LVOT obstruction, moderate MR, hypertrophy of the basal septum, LVEF 21%, normal basal wall motion, rest of the segments are severely hypokinetic to akinetic   -NGT in place: c/w aspirin 81 mg daily, Lipitor 80mg daily  - c/w Lopressor 12.5 mg PO BID as beneficial for Takutsobo w/ increased pre-load capability and reduction of tachycardic episodes to allow proper filling of ventricles  - No ACEi/ARB for now 2/2 SBP 90s; will initiate once BP stable  - Avoid preload reducing agents  - Palafox removed on 3/22 @ 12pm and Primafit place for strict I&Os, bladder scan q6hrs  - Strict I&Os, daily weights, core measures ordered, fluids via NGT

## 2020-03-24 LAB
ANION GAP SERPL CALC-SCNC: 7 MMOL/L — SIGNIFICANT CHANGE UP (ref 5–17)
APTT BLD: 28.2 SEC — SIGNIFICANT CHANGE UP (ref 27.5–36.3)
BASOPHILS # BLD AUTO: 0.01 K/UL — SIGNIFICANT CHANGE UP (ref 0–0.2)
BASOPHILS NFR BLD AUTO: 0.1 % — SIGNIFICANT CHANGE UP (ref 0–2)
BUN SERPL-MCNC: 16 MG/DL — SIGNIFICANT CHANGE UP (ref 7–23)
CALCIUM SERPL-MCNC: 8.2 MG/DL — LOW (ref 8.4–10.5)
CHLORIDE SERPL-SCNC: 106 MMOL/L — SIGNIFICANT CHANGE UP (ref 96–108)
CK SERPL-CCNC: 141 U/L — SIGNIFICANT CHANGE UP (ref 25–170)
CO2 SERPL-SCNC: 28 MMOL/L — SIGNIFICANT CHANGE UP (ref 22–31)
CREAT SERPL-MCNC: 0.35 MG/DL — LOW (ref 0.5–1.3)
CRP SERPL-MCNC: 0.81 MG/DL — HIGH (ref 0–0.4)
EOSINOPHIL # BLD AUTO: 0.11 K/UL — SIGNIFICANT CHANGE UP (ref 0–0.5)
EOSINOPHIL NFR BLD AUTO: 0.9 % — SIGNIFICANT CHANGE UP (ref 0–6)
ERYTHROCYTE [SEDIMENTATION RATE] IN BLOOD: 9 MM/HR — SIGNIFICANT CHANGE UP
FERRITIN SERPL-MCNC: 358 NG/ML — HIGH (ref 15–150)
FOLATE SERPL-MCNC: 8.3 NG/ML — SIGNIFICANT CHANGE UP
GLUCOSE BLDC GLUCOMTR-MCNC: 127 MG/DL — HIGH (ref 70–99)
GLUCOSE BLDC GLUCOMTR-MCNC: 139 MG/DL — HIGH (ref 70–99)
GLUCOSE BLDC GLUCOMTR-MCNC: 160 MG/DL — HIGH (ref 70–99)
GLUCOSE BLDC GLUCOMTR-MCNC: 90 MG/DL — SIGNIFICANT CHANGE UP (ref 70–99)
GLUCOSE SERPL-MCNC: 86 MG/DL — SIGNIFICANT CHANGE UP (ref 70–99)
HCT VFR BLD CALC: 35.3 % — SIGNIFICANT CHANGE UP (ref 34.5–45)
HGB BLD-MCNC: 11.4 G/DL — LOW (ref 11.5–15.5)
IMM GRANULOCYTES NFR BLD AUTO: 0.7 % — SIGNIFICANT CHANGE UP (ref 0–1.5)
INR BLD: 0.99 — SIGNIFICANT CHANGE UP (ref 0.88–1.16)
LDH SERPL L TO P-CCNC: 309 U/L — HIGH (ref 50–242)
LYMPHOCYTES # BLD AUTO: 1.08 K/UL — SIGNIFICANT CHANGE UP (ref 1–3.3)
LYMPHOCYTES # BLD AUTO: 8.9 % — LOW (ref 13–44)
MAGNESIUM SERPL-MCNC: 1.6 MG/DL — SIGNIFICANT CHANGE UP (ref 1.6–2.6)
MCHC RBC-ENTMCNC: 30 PG — SIGNIFICANT CHANGE UP (ref 27–34)
MCHC RBC-ENTMCNC: 32.3 GM/DL — SIGNIFICANT CHANGE UP (ref 32–36)
MCV RBC AUTO: 92.9 FL — SIGNIFICANT CHANGE UP (ref 80–100)
MONOCYTES # BLD AUTO: 0.99 K/UL — HIGH (ref 0–0.9)
MONOCYTES NFR BLD AUTO: 8.1 % — SIGNIFICANT CHANGE UP (ref 2–14)
NEUTROPHILS # BLD AUTO: 9.87 K/UL — HIGH (ref 1.8–7.4)
NEUTROPHILS NFR BLD AUTO: 81.3 % — HIGH (ref 43–77)
NRBC # BLD: 0 /100 WBCS — SIGNIFICANT CHANGE UP (ref 0–0)
PHOSPHATE SERPL-MCNC: 2.4 MG/DL — LOW (ref 2.5–4.5)
PLATELET # BLD AUTO: 137 K/UL — LOW (ref 150–400)
POTASSIUM SERPL-MCNC: 3.9 MMOL/L — SIGNIFICANT CHANGE UP (ref 3.5–5.3)
POTASSIUM SERPL-SCNC: 3.9 MMOL/L — SIGNIFICANT CHANGE UP (ref 3.5–5.3)
PROTHROM AB SERPL-ACNC: 11.3 SEC — SIGNIFICANT CHANGE UP (ref 10–12.9)
RAPID RVP RESULT: SIGNIFICANT CHANGE UP
RBC # BLD: 3.8 M/UL — SIGNIFICANT CHANGE UP (ref 3.8–5.2)
RBC # FLD: 15.6 % — HIGH (ref 10.3–14.5)
SODIUM SERPL-SCNC: 141 MMOL/L — SIGNIFICANT CHANGE UP (ref 135–145)
T3 SERPL-MCNC: 63 NG/DL — LOW (ref 80–200)
VIT B12 SERPL-MCNC: 1070 PG/ML — SIGNIFICANT CHANGE UP (ref 232–1245)
WBC # BLD: 12.48 K/UL — HIGH (ref 3.8–10.5)
WBC # FLD AUTO: 12.48 K/UL — HIGH (ref 3.8–10.5)

## 2020-03-24 PROCEDURE — 99232 SBSQ HOSP IP/OBS MODERATE 35: CPT

## 2020-03-24 RX ADMIN — ATORVASTATIN CALCIUM 80 MILLIGRAM(S): 80 TABLET, FILM COATED ORAL at 22:57

## 2020-03-24 RX ADMIN — CEFTRIAXONE 100 MILLIGRAM(S): 500 INJECTION, POWDER, FOR SOLUTION INTRAMUSCULAR; INTRAVENOUS at 16:27

## 2020-03-24 RX ADMIN — Medication 3 MILLILITER(S): at 06:01

## 2020-03-24 RX ADMIN — Medication 12.5 MILLIGRAM(S): at 06:01

## 2020-03-24 RX ADMIN — ENOXAPARIN SODIUM 30 MILLIGRAM(S): 100 INJECTION SUBCUTANEOUS at 06:01

## 2020-03-24 RX ADMIN — Medication 500 MILLIGRAM(S): at 16:30

## 2020-03-24 RX ADMIN — Medication 12.5 MILLIGRAM(S): at 17:34

## 2020-03-24 RX ADMIN — Medication 3 MILLILITER(S): at 22:56

## 2020-03-24 RX ADMIN — Medication 3 MILLILITER(S): at 14:20

## 2020-03-24 RX ADMIN — Medication 1 TABLET(S): at 16:28

## 2020-03-24 RX ADMIN — Medication 81 MILLIGRAM(S): at 11:53

## 2020-03-24 RX ADMIN — Medication 1: at 07:07

## 2020-03-24 NOTE — PROGRESS NOTE ADULT - PROBLEM SELECTOR PLAN 10
- CT chest 3/18: Few calcifications are seen in the breasts bilaterally.  - Refer for outpatient mammogram    Dispo: pending clinical progression; R/O COVID  VTE ppx: Lovenox    Case d/w Dr. Stokes

## 2020-03-24 NOTE — PROGRESS NOTE ADULT - ASSESSMENT
78 yo underweight female with FTT and unknown PMHx brought in by EMS after found down on floor for unknown period of time, found to have STEMI (in all leads) and STEMI code was activated s/p diagnostic cardiac cath 03/18/2020 with Dr Castanon revealing pLAD 70%, pD1 80-90%, no current plan for intervention, cath also revealing LVEF 15-20% with severe apical ballooning & akinesis suggestive of Takutsobo Cardiomyopathy,  pt with hypoxemic respiratory failure since admission and initial CT Chest on 3/18/2020 revealed e/o mucus plugging and small infiltrate in LLL, started on Ceftriaxone for likely post-obstructive PNA, desaturated on 03/19/2020 and CT ruled out PE but showed essentially total LLL collapse likely from mucus plugging, started on HFNC with improvement in inflation of LLL which has subsequently been titrated to NC and currently satting on RA, initially with AG metabolic acidosis 2/2 starvation and hypernatremia (now resolved) but continues with improving encephalopathy possibly in setting of infection vs. FTT vs underlying dementia/other underlying neuro issue,  NGT placed as pt has lost swallow reflex for meds/nutrition, stepped down to 5U for additional management from CCU on 3/22/20. Multiple complex SW issues, has step daughter who does not want to be involved in the care of the patient, with plan to get ethics team involved (may need guardian appointed). Palliative team consulted for further dispo planning (possible hospice).  Today am labs reviewed again with ID team; pt. with high suspicion for COVID as pt. with lympophenia; elevated AST, cxr with infiltrates.  Pt. is now transferred to COVID unit for further management.       Of note:  SLP evaluated pt on 3/22 and diet advanced to pureed diet w/ thin liquids with supplemental TFs; pt. tolerating oral feeds yesterday; SLP to evaluate today 3/24 if TF can be removed. Requires 100% supervision/feeding assistance & extra time to swallow. Cue to swallow as needed. Ensure oral cavity is clear prior to administering next bite or sip, upright position during and 30 min. follow meals. Misc orders placed.  Puree w/ thin liquids diet initiated on 3/22 as per SLP eval with supplemental TFs. c/w Jevity 1.2 via NGT @ 40 cc/hr.  Pt. received entire Jevity 3/23 with plan to see how pt. does with oral feeds on 3/24 and then decide if NGT still required.

## 2020-03-24 NOTE — CHART NOTE - NSCHARTNOTEFT_GEN_A_CORE
Patient is an 79 year old who presented with ST elevations in lateral leads, cardiac cath with 70% LAD no stenting needed, Takhad lymphopenia, LLL infiltrate on chest xray, and Takotsubo cardiomyopathy found at her apartment down. She had normal WBC count, but LLL infiltrate with lymphopenia. Temperature that was lowest was 96.4 F. She initially required HFNC, but is now on room air. Case discussed with Dr. Alfonso, will send RVP and COVID-19 to r/o COVID-19 Patient is an 79 year old who presented with ST elevations in lateral leads, cardiac cath with 70% LAD no stenting needed, lymphopenia, LLL infiltrate on chest xray, and Takotsubo cardiomyopathy found at her apartment down. She had normal WBC count, but LLL infiltrate with lymphopenia. Temperature that was lowest was 96.4 F. She initially required HFNC, but is now on room air. Case discussed with Dr. Alfonso, will send RVP and COVID-19 to r/o COVID-19

## 2020-03-24 NOTE — PROGRESS NOTE ADULT - PROBLEM SELECTOR PLAN 3
R/T LLL collapse and likely post-obstructive PNA, HFNC transitioned to 4L humidified NC on 3/22 AM which has been titrated off to RA  3/24/20   - RVP negative  - CTPE 3/20: r/o PE, but did show LLL collapse with mucus plugging and likely post-obstructive PNA. Cannot r/o endobronchial mass on CT PE, but less likely, poor candidate for invasive w/u  - CXR 3/22: without LLL collapse, small left effusion, resolution L lung infiltrates as described  - Urine Legionella negative  - Send sputum cytology if she produces sputum  - Pulm consulted, appreciate recs including chest PT, mucomyst, aspiration precautions, frequent suctioning, aggressive pulmonary hygiene  - Ceftriaxone 1g IV QD (initiated 3/21) to complete 5 day course (day 4/5)  - Hold off on azithromycin due to prolonged QTc and low suspicion for atypical cause of PNA  - NGT and aspiration precautions in place

## 2020-03-24 NOTE — PROGRESS NOTE ADULT - SUBJECTIVE AND OBJECTIVE BOX
Transfer acceptance note from [facility] to [address] [place] [address] Course:      SUBJECTIVE / INTERVAL HPI: Patient seen and examined at bedside.     VITAL SIGNS:  Vital Signs Last 24 Hrs  T(C): 36.7 (24 Mar 2020 08:59), Max: 36.8 (24 Mar 2020 06:40)  T(F): 98 (24 Mar 2020 08:59), Max: 98.3 (24 Mar 2020 06:40)  HR: 68 (24 Mar 2020 09:00) (54 - 74)  BP: 100/54 (24 Mar 2020 09:00) (88/54 - 112/55)  BP(mean): --  RR: 18 (24 Mar 2020 09:00) (16 - 18)  SpO2: 97% (24 Mar 2020 09:00) (94% - 97%)    PHYSICAL EXAM:    General: WDWN  HEENT: NC/AT; PERRL, anicteric sclera; MMM  Neck: supple  Cardiovascular: +S1/S2; RRR  Respiratory: CTA B/L; no W/R/R  Gastrointestinal: soft, NT/ND; +BSx4  Extremities: WWP; no edema, clubbing or cyanosis  Vascular: 2+ radial, DP/PT pulses B/L  Neurological: AAOx3; no focal deficits    MEDICATIONS:  MEDICATIONS  (STANDING):  acetylcysteine 10%  Inhalation 3 milliLiter(s) Inhalation three times a day  albuterol/ipratropium for Nebulization. 3 milliLiter(s) Nebulizer three times a day  ascorbic acid 500 milliGRAM(s) Oral every 24 hours  aspirin  chewable 81 milliGRAM(s) Enteral Tube daily  atorvastatin 80 milliGRAM(s) Oral every 24 hours  cefTRIAXone   IVPB 1000 milliGRAM(s) IV Intermittent every 24 hours  dextrose 5%. 1000 milliLiter(s) (50 mL/Hr) IV Continuous <Continuous>  dextrose 50% Injectable 12.5 Gram(s) IV Push once  dextrose 50% Injectable 25 Gram(s) IV Push once  dextrose 50% Injectable 25 Gram(s) IV Push once  enoxaparin Injectable 30 milliGRAM(s) SubCutaneous every 24 hours  insulin lispro (HumaLOG) corrective regimen sliding scale   SubCutaneous Before meals and at bedtime  metoprolol tartrate 12.5 milliGRAM(s) Oral every 12 hours  multivitamin 1 Tablet(s) Oral every 24 hours    MEDICATIONS  (PRN):  dextrose 40% Gel 15 Gram(s) Oral once PRN Blood Glucose LESS THAN 70 milliGRAM(s)/deciliter  glucagon  Injectable 1 milliGRAM(s) IntraMuscular once PRN Glucose LESS THAN 70 milligrams/deciliter      ALLERGIES:  Allergies    No Known Allergies    Intolerances        LABS:                        11.4   12.48 )-----------( 137      ( 24 Mar 2020 05:47 )             35.3     03-24    141  |  106  |  16  ----------------------------<  86  3.9   |  28  |  0.35<L>    Ca    8.2<L>      24 Mar 2020 05:47  Phos  2.4     03-24  Mg     1.6     03-24    TPro  5.2<L>  /  Alb  2.4<L>  /  TBili  0.4  /  DBili  x   /  AST  35  /  ALT  24  /  AlkPhos  52  03-23        CAPILLARY BLOOD GLUCOSE      POCT Blood Glucose.: 139 mg/dL (24 Mar 2020 11:14)      RADIOLOGY & ADDITIONAL TESTS: Reviewed. Transfer acceptance note from [facility] to [address] [place] [address] Course:  78 yo underweight female with FTT and unknown PMHx brought in by EMS after found down on floor for unknown period of time, found to have STEMI (in all leads) and STEMI code was activated s/p diagnostic cardiac cath 03/18/2020 with Dr Castanon revealing pLAD 70%, pD1 80-90%, no current plan for intervention, cath also revealing LVEF 15-20% with severe apical ballooning & akinesis suggestive of Takutsobo Cardiomyopathy,  pt with hypoxemic respiratory failure since admission and initial CT Chest on 3/18/2020 revealed e/o mucus plugging and small infiltrate in LLL, started on Ceftriaxone for likely post-obstructive PNA, desaturated on 03/19/2020 and CT ruled out PE but showed essentially total LLL collapse likely from mucus plugging, started on HFNC with improvement in inflation of LLL which has subsequently been titrated to NC and currently satting on RA, initially with AG metabolic acidosis 2/2 starvation and hypernatremia (now resolved) but continues with improving encephalopathy possibly in setting of infection vs. FTT vs underlying dementia/other underlying neuro issue,  NGT placed as pt has lost swallow reflex for meds/nutrition, stepped down to 5U for additional management from CCU on 3/22/20. Multiple complex SW issues, has step daughter who does not want to be involved in the care of the patient, with plan to get ethics team involved (may need guardian appointed). Palliative team consulted for further dispo planning (possible hospice).  Today am labs reviewed again with ID team; pt. with high suspicion for COVID as pt. with lympophenia; elevated AST, cxr with infiltrates.  Pt. is now transferred to COVID unit for further management.       SUBJECTIVE / INTERVAL HPI: Patient seen and examined at bedside.     VITAL SIGNS:  Vital Signs Last 24 Hrs  T(C): 36.7 (24 Mar 2020 08:59), Max: 36.8 (24 Mar 2020 06:40)  T(F): 98 (24 Mar 2020 08:59), Max: 98.3 (24 Mar 2020 06:40)  HR: 68 (24 Mar 2020 09:00) (54 - 74)  BP: 100/54 (24 Mar 2020 09:00) (88/54 - 112/55)  BP(mean): --  RR: 18 (24 Mar 2020 09:00) (16 - 18)  SpO2: 97% (24 Mar 2020 09:00) (94% - 97%)    PHYSICAL EXAM:    General: WDWN  HEENT: NC/AT; PERRL, anicteric sclera; MMM  Neck: supple  Cardiovascular: +S1/S2; RRR  Respiratory: CTA B/L; no W/R/R  Gastrointestinal: soft, NT/ND; +BSx4  Extremities: WWP; no edema, clubbing or cyanosis  Vascular: 2+ radial, DP/PT pulses B/L  Neurological: AAOx3; no focal deficits    MEDICATIONS:  MEDICATIONS  (STANDING):  acetylcysteine 10%  Inhalation 3 milliLiter(s) Inhalation three times a day  albuterol/ipratropium for Nebulization. 3 milliLiter(s) Nebulizer three times a day  ascorbic acid 500 milliGRAM(s) Oral every 24 hours  aspirin  chewable 81 milliGRAM(s) Enteral Tube daily  atorvastatin 80 milliGRAM(s) Oral every 24 hours  cefTRIAXone   IVPB 1000 milliGRAM(s) IV Intermittent every 24 hours  dextrose 5%. 1000 milliLiter(s) (50 mL/Hr) IV Continuous <Continuous>  dextrose 50% Injectable 12.5 Gram(s) IV Push once  dextrose 50% Injectable 25 Gram(s) IV Push once  dextrose 50% Injectable 25 Gram(s) IV Push once  enoxaparin Injectable 30 milliGRAM(s) SubCutaneous every 24 hours  insulin lispro (HumaLOG) corrective regimen sliding scale   SubCutaneous Before meals and at bedtime  metoprolol tartrate 12.5 milliGRAM(s) Oral every 12 hours  multivitamin 1 Tablet(s) Oral every 24 hours    MEDICATIONS  (PRN):  dextrose 40% Gel 15 Gram(s) Oral once PRN Blood Glucose LESS THAN 70 milliGRAM(s)/deciliter  glucagon  Injectable 1 milliGRAM(s) IntraMuscular once PRN Glucose LESS THAN 70 milligrams/deciliter      ALLERGIES:  Allergies    No Known Allergies    Intolerances        LABS:                        11.4   12.48 )-----------( 137      ( 24 Mar 2020 05:47 )             35.3     03-24    141  |  106  |  16  ----------------------------<  86  3.9   |  28  |  0.35<L>    Ca    8.2<L>      24 Mar 2020 05:47  Phos  2.4     03-24  Mg     1.6     03-24    TPro  5.2<L>  /  Alb  2.4<L>  /  TBili  0.4  /  DBili  x   /  AST  35  /  ALT  24  /  AlkPhos  52  03-23        CAPILLARY BLOOD GLUCOSE      POCT Blood Glucose.: 139 mg/dL (24 Mar 2020 11:14)      RADIOLOGY & ADDITIONAL TESTS: Reviewed. Transfer acceptance note from [facility] to [address] [place] [address] Course:  78 yo underweight female with FTT and unknown PMHx brought in by EMS after found down on floor for unknown period of time, found to have STEMI (in all leads) and STEMI code was activated s/p diagnostic cardiac cath 03/18/2020 with Dr Castanon revealing pLAD 70%, pD1 80-90%, no current plan for intervention, cath also revealing LVEF 15-20% with severe apical ballooning & akinesis suggestive of Takutsobo Cardiomyopathy,  pt with hypoxemic respiratory failure since admission and initial CT Chest on 3/18/2020 revealed e/o mucus plugging and small infiltrate in LLL, started on Ceftriaxone for likely post-obstructive PNA, desaturated on 03/19/2020 and CT ruled out PE but showed essentially total LLL collapse likely from mucus plugging, started on HFNC with improvement in inflation of LLL which has subsequently been titrated to NC and currently satting on RA, initially with AG metabolic acidosis 2/2 starvation and hypernatremia (now resolved) but continues with improving encephalopathy possibly in setting of infection vs. FTT vs underlying dementia/other underlying neuro issue,  NGT placed as pt has lost swallow reflex for meds/nutrition, stepped down to 5U for additional management from CCU on 3/22/20. Multiple complex SW issues, has step daughter who does not want to be involved in the care of the patient, with plan to get ethics team involved (may need guardian appointed). Palliative team consulted for further dispo planning (possible hospice).  Today am labs reviewed again with ID team; pt. with high suspicion for COVID as pt. with lympophenia; elevated AST, cxr with infiltrates.  Pt. is now transferred to COVID unit for further management.       SUBJECTIVE / INTERVAL HPI: Patient seen and examined at bedside by attending, Dr. Dailey    VITAL SIGNS:  Vital Signs Last 24 Hrs  T(C): 36.7 (24 Mar 2020 08:59), Max: 36.8 (24 Mar 2020 06:40)  T(F): 98 (24 Mar 2020 08:59), Max: 98.3 (24 Mar 2020 06:40)  HR: 68 (24 Mar 2020 09:00) (54 - 74)  BP: 100/54 (24 Mar 2020 09:00) (88/54 - 112/55)  BP(mean): --  RR: 18 (24 Mar 2020 09:00) (16 - 18)  SpO2: 97% (24 Mar 2020 09:00) (94% - 97%)    PHYSICAL EXAM per Dr. Dailey    General:   HEENT: NC/AT; anicteric sclera; MMM  Neck: supple  Cardiovascular: +S1/S2; RRR  Respiratory: CTA B/L; no W/R/R  Gastrointestinal: soft, NT/ND; +BSx4  Extremities: WWP; no edema, clubbing or cyanosis  Vascular: 2+ radial, DP/PT pulses B/L  Neurological: AAOx3; no focal deficits    MEDICATIONS:  MEDICATIONS  (STANDING):  acetylcysteine 10%  Inhalation 3 milliLiter(s) Inhalation three times a day  albuterol/ipratropium for Nebulization. 3 milliLiter(s) Nebulizer three times a day  ascorbic acid 500 milliGRAM(s) Oral every 24 hours  aspirin  chewable 81 milliGRAM(s) Enteral Tube daily  atorvastatin 80 milliGRAM(s) Oral every 24 hours  cefTRIAXone   IVPB 1000 milliGRAM(s) IV Intermittent every 24 hours  dextrose 5%. 1000 milliLiter(s) (50 mL/Hr) IV Continuous <Continuous>  dextrose 50% Injectable 12.5 Gram(s) IV Push once  dextrose 50% Injectable 25 Gram(s) IV Push once  dextrose 50% Injectable 25 Gram(s) IV Push once  enoxaparin Injectable 30 milliGRAM(s) SubCutaneous every 24 hours  insulin lispro (HumaLOG) corrective regimen sliding scale   SubCutaneous Before meals and at bedtime  metoprolol tartrate 12.5 milliGRAM(s) Oral every 12 hours  multivitamin 1 Tablet(s) Oral every 24 hours    MEDICATIONS  (PRN):  dextrose 40% Gel 15 Gram(s) Oral once PRN Blood Glucose LESS THAN 70 milliGRAM(s)/deciliter  glucagon  Injectable 1 milliGRAM(s) IntraMuscular once PRN Glucose LESS THAN 70 milligrams/deciliter      ALLERGIES:  Allergies    No Known Allergies    Intolerances        LABS:                        11.4   12.48 )-----------( 137      ( 24 Mar 2020 05:47 )             35.3     03-24    141  |  106  |  16  ----------------------------<  86  3.9   |  28  |  0.35<L>    Ca    8.2<L>      24 Mar 2020 05:47  Phos  2.4     03-24  Mg     1.6     03-24    TPro  5.2<L>  /  Alb  2.4<L>  /  TBili  0.4  /  DBili  x   /  AST  35  /  ALT  24  /  AlkPhos  52  03-23        CAPILLARY BLOOD GLUCOSE      POCT Blood Glucose.: 139 mg/dL (24 Mar 2020 11:14)      RADIOLOGY & ADDITIONAL TESTS: Reviewed. Transfer acceptance note from [facility] to [address] [place] [address] Course:  80 yo underweight female with FTT and unknown PMHx brought in by EMS after found down on floor for unknown period of time, found to have STEMI (in all leads) and STEMI code was activated s/p diagnostic cardiac cath 03/18/2020 with Dr Castanon revealing pLAD 70%, pD1 80-90%, no current plan for intervention, cath also revealing LVEF 15-20% with severe apical ballooning & akinesis suggestive of Takutsobo Cardiomyopathy,  pt with hypoxemic respiratory failure since admission and initial CT Chest on 3/18/2020 revealed e/o mucus plugging and small infiltrate in LLL, started on Ceftriaxone for likely post-obstructive PNA, desaturated on 03/19/2020 and CT ruled out PE but showed essentially total LLL collapse likely from mucus plugging, started on HFNC with improvement in inflation of LLL which has subsequently been titrated to NC and currently satting on RA, initially with AG metabolic acidosis 2/2 starvation and hypernatremia (now resolved) but continues with improving encephalopathy possibly in setting of infection vs. FTT vs underlying dementia/other underlying neuro issue,  NGT placed as pt has lost swallow reflex for meds/nutrition, stepped down to 5U for additional management from CCU on 3/22/20. Multiple complex SW issues, has step daughter who does not want to be involved in the care of the patient, with plan to get ethics team involved (may need guardian appointed). Palliative team consulted for further dispo planning (possible hospice).  Today am labs reviewed again with ID team; pt. with high suspicion for COVID as pt. with lympophenia; elevated AST, cxr with infiltrates.  Pt. is now transferred to COVID unit for further management.       SUBJECTIVE / INTERVAL HPI: Patient seen and examined at bedside by attending, Dr. Dailey    VITAL SIGNS:  Vital Signs Last 24 Hrs  T(C): 36.7 (24 Mar 2020 08:59), Max: 36.8 (24 Mar 2020 06:40)  T(F): 98 (24 Mar 2020 08:59), Max: 98.3 (24 Mar 2020 06:40)  HR: 68 (24 Mar 2020 09:00) (54 - 74)  BP: 100/54 (24 Mar 2020 09:00) (88/54 - 112/55)  BP(mean): --  RR: 18 (24 Mar 2020 09:00) (16 - 18)  SpO2: 97% (24 Mar 2020 09:00) (94% - 97%)    PHYSICAL EXAM: per Dr. Dailey    General: Resting in bed comfortably  HEENT: NC/AT; anicteric sclera; MMM  Neck: supple  Cardiovascular: +S1/S2; RRR  Respiratory: decreased breath sounds b/l; no W/R/R  Gastrointestinal: soft, NT/ND; +BSx4  Extremities: WWP; no edema.  Vascular: 2+ radial, DP/PT pulses B/L    MEDICATIONS:  MEDICATIONS  (STANDING):  acetylcysteine 10%  Inhalation 3 milliLiter(s) Inhalation three times a day  albuterol/ipratropium for Nebulization. 3 milliLiter(s) Nebulizer three times a day  ascorbic acid 500 milliGRAM(s) Oral every 24 hours  aspirin  chewable 81 milliGRAM(s) Enteral Tube daily  atorvastatin 80 milliGRAM(s) Oral every 24 hours  cefTRIAXone   IVPB 1000 milliGRAM(s) IV Intermittent every 24 hours  dextrose 5%. 1000 milliLiter(s) (50 mL/Hr) IV Continuous <Continuous>  dextrose 50% Injectable 12.5 Gram(s) IV Push once  dextrose 50% Injectable 25 Gram(s) IV Push once  dextrose 50% Injectable 25 Gram(s) IV Push once  enoxaparin Injectable 30 milliGRAM(s) SubCutaneous every 24 hours  insulin lispro (HumaLOG) corrective regimen sliding scale   SubCutaneous Before meals and at bedtime  metoprolol tartrate 12.5 milliGRAM(s) Oral every 12 hours  multivitamin 1 Tablet(s) Oral every 24 hours    MEDICATIONS  (PRN):  dextrose 40% Gel 15 Gram(s) Oral once PRN Blood Glucose LESS THAN 70 milliGRAM(s)/deciliter  glucagon  Injectable 1 milliGRAM(s) IntraMuscular once PRN Glucose LESS THAN 70 milligrams/deciliter      ALLERGIES:  Allergies    No Known Allergies    Intolerances        LABS:                        11.4   12.48 )-----------( 137      ( 24 Mar 2020 05:47 )             35.3     03-24    141  |  106  |  16  ----------------------------<  86  3.9   |  28  |  0.35<L>    Ca    8.2<L>      24 Mar 2020 05:47  Phos  2.4     03-24  Mg     1.6     03-24    TPro  5.2<L>  /  Alb  2.4<L>  /  TBili  0.4  /  DBili  x   /  AST  35  /  ALT  24  /  AlkPhos  52  03-23        CAPILLARY BLOOD GLUCOSE      POCT Blood Glucose.: 139 mg/dL (24 Mar 2020 11:14)      RADIOLOGY & ADDITIONAL TESTS: Reviewed.

## 2020-03-24 NOTE — PROGRESS NOTE ADULT - PROBLEM SELECTOR PLAN 7
Unclear as to why pt can't initiate swallowing reflex, most likely 2/2 encephalopathy  - SLP evaluated pt on 3/22 and diet advanced to pureed diet w/ thin liquids with supplemental TFs; pt. tolerating oral feeds yesterday; SLP to evaluate today 3/24 if TF can be removed.    - Requires 100% supervision/feeding assistance & extra time to swallow. Cue to swallow as needed. Ensure oral cavity is clear prior to administering next bite or sip, upright position during and 30 min. follow meals. Misc orders placed.

## 2020-03-24 NOTE — PROGRESS NOTE ADULT - PROBLEM SELECTOR PLAN 8
Cachectic, underweight, anorexic BMI=15.8, multiple pressure ulcers on body, RD/ SLP/SW following  - Most likely has not been taken care of, no recorded home address or family contacts.   - Puree w/ thin liquids diet initiated on 3/22 as per SLP eval with supplemental TFs. c/w Jevity 1.2 via NGT @ 40 cc/hr.  Pt. received entire Jevity 3/23 with plan to see how pt. does with oral feeds on 3/24 and then decide if NGT still required.  - Calorie count ordered and to be complete by dietitian prior to removing NGT d/t concerns pt may not meet nutritional needs.   - Monitor electrolytes (including Phos) closely for refeeding syndrome and replete PRN.

## 2020-03-24 NOTE — PROGRESS NOTE ADULT - PROBLEM SELECTOR PLAN 1
Euvolemic on exam, most likely 2/2 AG metabolic acidosis 2/2 starvation ketosis vs stress/emotional vs infection.   - EKG 3/22: 1 mm ST-elevation in V3-V4 and TWI in inferolateral leads 2/2 Takusubo cardiomyopathy.   - s/p diagnostic LHC/RHC 03/18/2020 with Dr. JACOBY Castanon: pLAD 70%, mLad 60-70%, pD1 80-90%. pLCX 30-40%, OM1/RCA with mild LI,  EF 15-20%, EDP 17, severe apical ballooning, akinesis suggestive of Takutsobo CM; RHC PA 21/10 PCWP 14. PA sat 67%. AoSat 96%. CO 2.9. CI 2.16, R groin & R radial.   - Echo 3/20/2020: MV moderately thickened, systolic anterior motion of the MV resulting in an LVOT gradient of 62.00 mmHg and severe LVOT obstruction, moderate MR, hypertrophy of the basal septum, LVEF 21%, normal basal wall motion, rest of the segments are severely hypokinetic to akinetic  -NGT in place: c/w aspirin 81 mg daily, Lipitor 80mg daily  - c/w Lopressor 12.5 mg PO BID as beneficial for Takutsobo w/ increased pre-load capability and reduction of tachycardic episodes to allow proper filling of ventricles  - No ACEi/ARB for now 2/2 SBP 90s; will initiate once BP stable  - Avoid preload reducing agents  - Palafox removed on 3/22 @ 12pm; pt. passed TOV.   - Strict I&Os, daily weights, core measures ordered, fluids via NGT    #R/O COVID19  - pt's hospital roommate w/ known COVID19 exposure and will be ruled out.  - pt asymptomatic but will be ruled out also. Euvolemic on exam, most likely 2/2 AG metabolic acidosis 2/2 starvation ketosis vs stress/emotional vs infection.   - EKG 3/22: 1 mm ST-elevation in V3-V4 and TWI in inferolateral leads 2/2 Takusubo cardiomyopathy.   - s/p diagnostic LHC/RHC 03/18/2020 with Dr. JACOBY Castanon: pLAD 70%, mLad 60-70%, pD1 80-90%. pLCX 30-40%, OM1/RCA with mild LI,  EF 15-20%, EDP 17, severe apical ballooning, akinesis suggestive of Takutsobo CM; RHC PA 21/10 PCWP 14. PA sat 67%. AoSat 96%. CO 2.9. CI 2.16, R groin & R radial.   - Echo 3/20/2020: MV moderately thickened, systolic anterior motion of the MV resulting in an LVOT gradient of 62.00 mmHg and severe LVOT obstruction, moderate MR, hypertrophy of the basal septum, LVEF 21%, normal basal wall motion, rest of the segments are severely hypokinetic to akinetic  -NGT in place: c/w aspirin 81 mg daily, Lipitor 80mg daily  - c/w Lopressor 12.5 mg PO BID as beneficial for Takutsobo w/ increased pre-load capability and reduction of tachycardic episodes to allow proper filling of ventricles  - No ACEi/ARB for now 2/2 SBP 90s; will initiate once BP stable  - Avoid preload reducing agents  - Palafox removed on 3/22 @ 12pm; pt. passed TOV.   - Strict I&Os, daily weights, core measures ordered, fluids via NGT    #R/O COVID19  - pt's hospital roommate w/ known COVID19 exposure and will be ruled out.  - pt asymptomatic but will be ruled out also.  - will hold off on protocol for now until test result comes back. Euvolemic on exam, most likely 2/2 AG metabolic acidosis 2/2 starvation ketosis vs stress/emotional vs infection.   - EKG 3/22: 1 mm ST-elevation in V3-V4 and TWI in inferolateral leads 2/2 Takusubo cardiomyopathy.   - s/p diagnostic LHC/RHC 03/18/2020 with Dr. JACOBY Castanon: pLAD 70%, mLad 60-70%, pD1 80-90%. pLCX 30-40%, OM1/RCA with mild LI,  EF 15-20%, EDP 17, severe apical ballooning, akinesis suggestive of Takutsobo CM; RHC PA 21/10 PCWP 14. PA sat 67%. AoSat 96%. CO 2.9. CI 2.16, R groin & R radial.   - Echo 3/20/2020: MV moderately thickened, systolic anterior motion of the MV resulting in an LVOT gradient of 62.00 mmHg and severe LVOT obstruction, moderate MR, hypertrophy of the basal septum, LVEF 21%, normal basal wall motion, rest of the segments are severely hypokinetic to akinetic  -NGT in place: c/w aspirin 81 mg daily, Lipitor 80mg daily  - c/w Lopressor 12.5 mg PO BID as beneficial for Takutsobo w/ increased pre-load capability and reduction of tachycardic episodes to allow proper filling of ventricles  - No ACEi/ARB for now 2/2 SBP 90s; will initiate once BP stable  - Avoid preload reducing agents  - Palafox removed on 3/22 @ 12pm; pt. passed TOV.   - Strict I&Os, daily weights, core measures ordered, fluids via NGT    #R/O COVID19  - pt's hospital roommate w/ known COVID19 exposure and will be ruled out.  - pt asymptomatic but will be ruled out also.  - will hold off on treatment protocol for now until test result comes back.  - f/u COVID labs

## 2020-03-24 NOTE — PROGRESS NOTE ADULT - SUBJECTIVE AND OBJECTIVE BOX
TRANSFER NOTE TO COVID UNIT with TELE    CC: STEMI        78 yo underweight female with FTT and unknown PMHx brought in by EMS after found down on floor for unknown period of time, found to have STEMI (in all leads) and STEMI code was activated s/p diagnostic cardiac cath 03/18/2020 with Dr Castanon revealing pLAD 70%, pD1 80-90%, no current plan for intervention, cath also revealing LVEF 15-20% with severe apical ballooning & akinesis suggestive of Takutsobo Cardiomyopathy,  pt with hypoxemic respiratory failure since admission and initial CT Chest on 3/18/2020 revealed e/o mucus plugging and small infiltrate in LLL, started on Ceftriaxone for likely post-obstructive PNA, desaturated on 03/19/2020 and CT ruled out PE but showed essentially total LLL collapse likely from mucus plugging, started on HFNC with improvement in inflation of LLL which has subsequently been titrated to NC and currently satting on RA, initially with AG metabolic acidosis 2/2 starvation and hypernatremia (now resolved) but continues with improving encephalopathy possibly in setting of infection vs. FTT vs underlying dementia/other underlying neuro issue,  NGT placed as pt has lost swallow reflex for meds/nutrition, stepped down to 5U for additional management from CCU on 3/22/20. Multiple complex SW issues, has step daughter who does not want to be involved in the care of the patient, with plan to get ethics team involved (may need guardian appointed). Palliative team consulted for further dispo planning (possible hospice).  Today am labs reviewed again with ID team; pt. with high suspicion for COVID as pt. with lympophenia; elevated AST, cxr with infiltrates.  Pt. is now transferred to COVID unit for further management; COVID testing sent by ID team.      Of note:  SLP evaluated pt on 3/22 and diet advanced to pureed diet w/ thin liquids with supplemental TFs; pt. tolerating oral feeds yesterday; SLP to evaluate today 3/24 if TF can be removed. Requires 100% supervision/feeding assistance & extra time to swallow. Cue to swallow as needed. Ensure oral cavity is clear prior to administering next bite or sip, upright position during and 30 min. follow meals. Misc orders placed.  Puree w/ thin liquids diet initiated on 3/22 as per SLP eval with supplemental TFs. c/w Jevity 1.2 via NGT @ 40 cc/hr.  Pt. received entire Jevity 3/23 with plan to see how pt. does with oral feeds on 3/24 and then decide if NGT still required.         	  MEDICATIONS:  metoprolol tartrate 12.5 milliGRAM(s) Oral every 12 hours  cefTRIAXone   IVPB 1000 milliGRAM(s) IV Intermittent every 24 hours  acetylcysteine 10%  Inhalation 3 milliLiter(s) Inhalation three times a day  albuterol/ipratropium for Nebulization. 3 milliLiter(s) Nebulizer three times a day  atorvastatin 80 milliGRAM(s) Oral every 24 hours  dextrose 40% Gel 15 Gram(s) Oral once PRN  dextrose 50% Injectable 12.5 Gram(s) IV Push once  dextrose 50% Injectable 25 Gram(s) IV Push once  dextrose 50% Injectable 25 Gram(s) IV Push once  glucagon  Injectable 1 milliGRAM(s) IntraMuscular once PRN  insulin lispro (HumaLOG) corrective regimen sliding scale   SubCutaneous Before meals and at bedtime  ascorbic acid 500 milliGRAM(s) Oral every 24 hours  aspirin  chewable 81 milliGRAM(s) Enteral Tube daily  dextrose 5%. 1000 milliLiter(s) IV Continuous <Continuous>  enoxaparin Injectable 30 milliGRAM(s) SubCutaneous every 24 hours  multivitamin 1 Tablet(s) Oral every 24 hours      	    [PHYSICAL EXAM:  TELEMETRY:  T(C): 36.7 (03-24-20 @ 08:59), Max: 36.8 (03-24-20 @ 06:40)  HR: 54 (03-24-20 @ 08:15) (54 - 74)  BP: 88/54 (03-24-20 @ 08:15) (88/54 - 112/55)  RR: 18 (03-24-20 @ 08:15) (16 - 18)  SpO2: 97% (03-24-20 @ 08:15) (94% - 97%)  Wt(kg): --  I&O's Summary    23 Mar 2020 07:01  -  24 Mar 2020 07:00  --------------------------------------------------------  IN: 620 mL / OUT: 300 mL / NET: 320 mL        Palafox:  Central/PICC/Mid Line:                                           Appearance: cachectic on HFNC, no signs of distress.   HEENT:   Normal oral mucosa, unable to perform EOM test  Neck: No JCD  Cardiovascular: Normal S1 S2, no murmur auscultated  Respiratory: decreased breath sounds b/l base L> R  Gastrointestinal:  Soft, Non-tender, + BS	x4  Skin: ecchymosis on lateral left knee.   Extremities: Normal range of motion, No clubbing, cyanosis or edema  Vascular: DP/PT faint B/L, extremities warm to touch, R radial 2+ without hematoma or ooze but with scattered ecchymosis, R groin without hematoma or ooze (no bruit ascultated)  Neurologic: Can perform hand squeeze on both hands and move toes on right foot but not on left foot; however did bend L knee spontaneously  Psychiatry: Unable to assess 2/2 lack of ability to verbalize  	    ECG:  	  RADIOLOGY:   DIAGNOSTIC TESTING:  [ ] Echocardiogram:  [ ]  Catheterization:  [ ] Stress Test:    [ ] INA  OTHER: 	    LABS:	 	  CARDIAC MARKERS:                                  11.4   12.48 )-----------( 137      ( 24 Mar 2020 05:47 )             35.3     03-24    141  |  106  |  16  ----------------------------<  86  3.9   |  28  |  0.35<L>    Ca    8.2<L>      24 Mar 2020 05:47  Phos  2.4     03-24  Mg     1.6     03-24    TPro  5.2<L>  /  Alb  2.4<L>  /  TBili  0.4  /  DBili  x   /  AST  35  /  ALT  24  /  AlkPhos  52  03-23    proBNP:   Lipid Profile:   HgA1c:   TSH:       ASSESSMENT/PLAN: 	        DVT ppx:  Dispo:

## 2020-03-24 NOTE — PROGRESS NOTE ADULT - PROBLEM SELECTOR PLAN 1
Euvolemic on exam, most likely 2/2 AG metabolic acidosis 2/2 starvation ketosis vs stress/emotional vs infection.   - EKG 3/22: 1 mm ST-elevation in V3-V4 and TWI in inferolateral leads 2/2 Takusubo cardiomyopathy.   - s/p diagnostic LHC/RHC 03/18/2020 with Dr. JACOBY Castanon: pLAD 70%, mLad 60-70%, pD1 80-90%. pLCX 30-40%, OM1/RCA with mild LI,  EF 15-20%, EDP 17, severe apical ballooning, akinesis suggestive of Takutsobo CM; RHC PA 21/10 PCWP 14. PA sat 67%. AoSat 96%. CO 2.9. CI 2.16, R groin & R radial.   - Echo 3/20/2020: MV moderately thickened, systolic anterior motion of the MV resulting in an LVOT gradient of 62.00 mmHg and severe LVOT obstruction, moderate MR, hypertrophy of the basal septum, LVEF 21%, normal basal wall motion, rest of the segments are severely hypokinetic to akinetic   -NGT in place: c/w aspirin 81 mg daily, Lipitor 80mg daily  - c/w Lopressor 12.5 mg PO BID as beneficial for Takutsobo w/ increased pre-load capability and reduction of tachycardic episodes to allow proper filling of ventricles  - No ACEi/ARB for now 2/2 SBP 90s; will initiate once BP stable  - Avoid preload reducing agents  - Palafox removed on 3/22 @ 12pm; pt. passed TOV.   - Strict I&Os, daily weights, core measures ordered, fluids via NGT

## 2020-03-24 NOTE — PROGRESS NOTE ADULT - ASSESSMENT
80 yo underweight female with FTT and unknown PMHx brought in by EMS after found down on floor for unknown period of time, found to have STEMI (in all leads) and STEMI code was activated s/p diagnostic cardiac cath 03/18/2020 with Dr Castanon revealing pLAD 70%, pD1 80-90%, no current plan for intervention, cath also revealing LVEF 15-20% with severe apical ballooning & akinesis suggestive of Takutsobo Cardiomyopathy, pt with hypoxemic respiratory failure since admission Palliative team consulted for further dispo planning (possible hospice). Pt with high suspicion for COVID as pt. with lympophenia; elevated AST, cxr with infiltrates.  Pt. is now transferred to COVID unit for further management.

## 2020-03-25 LAB
ALBUMIN SERPL ELPH-MCNC: 2.8 G/DL — LOW (ref 3.3–5)
ALP SERPL-CCNC: 73 U/L — SIGNIFICANT CHANGE UP (ref 40–120)
ALT FLD-CCNC: 40 U/L — SIGNIFICANT CHANGE UP (ref 10–45)
ANION GAP SERPL CALC-SCNC: 11 MMOL/L — SIGNIFICANT CHANGE UP (ref 5–17)
APTT BLD: 31.4 SEC — SIGNIFICANT CHANGE UP (ref 27.5–36.3)
AST SERPL-CCNC: 44 U/L — HIGH (ref 10–40)
BASOPHILS # BLD AUTO: 0.01 K/UL — SIGNIFICANT CHANGE UP (ref 0–0.2)
BASOPHILS NFR BLD AUTO: 0.1 % — SIGNIFICANT CHANGE UP (ref 0–2)
BILIRUB SERPL-MCNC: 0.5 MG/DL — SIGNIFICANT CHANGE UP (ref 0.2–1.2)
BUN SERPL-MCNC: 19 MG/DL — SIGNIFICANT CHANGE UP (ref 7–23)
CALCIUM SERPL-MCNC: 8.8 MG/DL — SIGNIFICANT CHANGE UP (ref 8.4–10.5)
CHLORIDE SERPL-SCNC: 100 MMOL/L — SIGNIFICANT CHANGE UP (ref 96–108)
CK SERPL-CCNC: 166 U/L — SIGNIFICANT CHANGE UP (ref 25–170)
CO2 SERPL-SCNC: 25 MMOL/L — SIGNIFICANT CHANGE UP (ref 22–31)
CREAT SERPL-MCNC: 0.36 MG/DL — LOW (ref 0.5–1.3)
CRP SERPL-MCNC: 0.44 MG/DL — HIGH (ref 0–0.4)
EOSINOPHIL # BLD AUTO: 0.03 K/UL — SIGNIFICANT CHANGE UP (ref 0–0.5)
EOSINOPHIL NFR BLD AUTO: 0.2 % — SIGNIFICANT CHANGE UP (ref 0–6)
FERRITIN SERPL-MCNC: 386 NG/ML — HIGH (ref 15–150)
GLUCOSE BLDC GLUCOMTR-MCNC: 142 MG/DL — HIGH (ref 70–99)
GLUCOSE BLDC GLUCOMTR-MCNC: 148 MG/DL — HIGH (ref 70–99)
GLUCOSE BLDC GLUCOMTR-MCNC: 80 MG/DL — SIGNIFICANT CHANGE UP (ref 70–99)
GLUCOSE BLDC GLUCOMTR-MCNC: 87 MG/DL — SIGNIFICANT CHANGE UP (ref 70–99)
GLUCOSE SERPL-MCNC: 189 MG/DL — HIGH (ref 70–99)
HCT VFR BLD CALC: 43.1 % — SIGNIFICANT CHANGE UP (ref 34.5–45)
HGB BLD-MCNC: 14.2 G/DL — SIGNIFICANT CHANGE UP (ref 11.5–15.5)
IMM GRANULOCYTES NFR BLD AUTO: 0.7 % — SIGNIFICANT CHANGE UP (ref 0–1.5)
INR BLD: 0.99 — SIGNIFICANT CHANGE UP (ref 0.88–1.16)
LDH SERPL L TO P-CCNC: 305 U/L — HIGH (ref 50–242)
LYMPHOCYTES # BLD AUTO: 1.52 K/UL — SIGNIFICANT CHANGE UP (ref 1–3.3)
LYMPHOCYTES # BLD AUTO: 12.2 % — LOW (ref 13–44)
MAGNESIUM SERPL-MCNC: 1.7 MG/DL — SIGNIFICANT CHANGE UP (ref 1.6–2.6)
MCHC RBC-ENTMCNC: 30 PG — SIGNIFICANT CHANGE UP (ref 27–34)
MCHC RBC-ENTMCNC: 32.9 GM/DL — SIGNIFICANT CHANGE UP (ref 32–36)
MCV RBC AUTO: 91.1 FL — SIGNIFICANT CHANGE UP (ref 80–100)
MONOCYTES # BLD AUTO: 0.93 K/UL — HIGH (ref 0–0.9)
MONOCYTES NFR BLD AUTO: 7.4 % — SIGNIFICANT CHANGE UP (ref 2–14)
NEUTROPHILS # BLD AUTO: 9.93 K/UL — HIGH (ref 1.8–7.4)
NEUTROPHILS NFR BLD AUTO: 79.4 % — HIGH (ref 43–77)
NRBC # BLD: 0 /100 WBCS — SIGNIFICANT CHANGE UP (ref 0–0)
PLATELET # BLD AUTO: 237 K/UL — SIGNIFICANT CHANGE UP (ref 150–400)
POTASSIUM SERPL-MCNC: 4.2 MMOL/L — SIGNIFICANT CHANGE UP (ref 3.5–5.3)
POTASSIUM SERPL-SCNC: 4.2 MMOL/L — SIGNIFICANT CHANGE UP (ref 3.5–5.3)
PROT SERPL-MCNC: 6.2 G/DL — SIGNIFICANT CHANGE UP (ref 6–8.3)
PROTHROM AB SERPL-ACNC: 11.3 SEC — SIGNIFICANT CHANGE UP (ref 10–12.9)
RBC # BLD: 4.73 M/UL — SIGNIFICANT CHANGE UP (ref 3.8–5.2)
RBC # FLD: 15.6 % — HIGH (ref 10.3–14.5)
SARS-COV-2 RNA SPEC QL NAA+PROBE: SIGNIFICANT CHANGE UP
SARS-COV-2 RNA SPEC QL NAA+PROBE: SIGNIFICANT CHANGE UP
SODIUM SERPL-SCNC: 136 MMOL/L — SIGNIFICANT CHANGE UP (ref 135–145)
WBC # BLD: 12.51 K/UL — HIGH (ref 3.8–10.5)
WBC # FLD AUTO: 12.51 K/UL — HIGH (ref 3.8–10.5)

## 2020-03-25 PROCEDURE — 99232 SBSQ HOSP IP/OBS MODERATE 35: CPT | Mod: GC

## 2020-03-25 RX ORDER — ASPIRIN/CALCIUM CARB/MAGNESIUM 324 MG
81 TABLET ORAL DAILY
Refills: 0 | Status: DISCONTINUED | OUTPATIENT
Start: 2020-03-25 | End: 2020-03-27

## 2020-03-25 RX ADMIN — Medication 12.5 MILLIGRAM(S): at 06:05

## 2020-03-25 RX ADMIN — Medication 1 TABLET(S): at 17:32

## 2020-03-25 RX ADMIN — ATORVASTATIN CALCIUM 80 MILLIGRAM(S): 80 TABLET, FILM COATED ORAL at 21:02

## 2020-03-25 RX ADMIN — Medication 3 MILLILITER(S): at 14:00

## 2020-03-25 RX ADMIN — ENOXAPARIN SODIUM 30 MILLIGRAM(S): 100 INJECTION SUBCUTANEOUS at 06:05

## 2020-03-25 RX ADMIN — Medication 3 MILLILITER(S): at 06:05

## 2020-03-25 RX ADMIN — Medication 3 MILLILITER(S): at 21:11

## 2020-03-25 RX ADMIN — Medication 81 MILLIGRAM(S): at 11:18

## 2020-03-25 RX ADMIN — Medication 12.5 MILLIGRAM(S): at 17:32

## 2020-03-25 RX ADMIN — Medication 500 MILLIGRAM(S): at 17:32

## 2020-03-25 NOTE — PROGRESS NOTE ADULT - ASSESSMENT
80 yo underweight female with FTT and unknown PMHx brought in by EMS after found down on floor for unknown period of time, found to have STEMI (in all leads) and STEMI code was activated s/p diagnostic cardiac cath 03/18/2020 with Dr Castanon revealing pLAD 70%, pD1 80-90%, no current plan for intervention, cath also revealing LVEF 15-20% with severe apical ballooning & akinesis suggestive of Takutsobo Cardiomyopathy, pt with hypoxemic respiratory failure since admission Palliative team consulted for further dispo planning (possible hospice). Pt with high suspicion for COVID as pt. with lympophenia; elevated AST, cxr with infiltrates.  Pt. is now transferred to COVID unit for r/o and further management.

## 2020-03-25 NOTE — PROGRESS NOTE ADULT - ATTENDING COMMENTS
79F w/ no known pmh BIBEMS w/ concern for STEMI s/p LHC w/ non-obstr CAD, found to have newly reduced EF suggestive of takotsubo cardiomyopathy, transferred to CCU for further mgmt    -sCHF - Acute systolic CHF 2/2 Takotsubo CM. TTE w/ EF 20% global hypokinesis and hyperkinetic bases, Moderate MR  Pt. is currently euvolemic to dry w/ no evidence of low output. Patient also has LVOT obstructive physiology(Septal Knuckle + Takotsubo CM) w/ pGrad of 62mmHg.   -Pulm - Hypoxemia - Pt. does not appear overloaded on exam and is not a great candidate for aggressive diuresis(LVOT Obstr + Soft BPs). Will get CTA to r/o PE. Her initial CT on admission had no findings c/w Infectious etiology. Will f/u CTA results and proceed accordingly; c/w HFNC and will wean as tolerated  -Neuro - Pt. is able to follow all commands but has yet to physically speak. Unclear if behavorial or encephalopathic. Initially mildly hypernatremic, dehydrated and malnourished on presentation. s/p NGT placement for TFs and Free water. Na now WNL. Will continue to monitor. No focal deficits on exam and CT head consistent w/ significant Age-related volume loss, but no acute infarct or hemorrhage. Will consider MRI and Neuro consult if no improvement   -c/w TFs  -OOB to chair  -DVT PPx  -Dispo: CCU    Dorcas Day MD  CCU Attending
Pt with lymphopenia and elevated LFTs but has been showing improvement.  Roommate may have corona virus. Pt will be moved to R/O Covid infevtion.  Dx is Takutsubo CM with long QT interval.
COVID 19 exposure. tested negative.  Will repeat testing considering possibility of falsely negative test. f it is negative will tx out of covid unit and consider retesting in few days.  Will d/w cardiology initiation of antiplatelet drug such as clopidogrel for CAD noted on LHC.  LLL  dense infiltrate , if WBC not improving will extend abx course
I personally examined the pt and discussed care plan. Not verbalizing with me but nurse reports pt did talk to her which would be an improvement.  Issues remain including mucous plugging, lack of mobilization and severe cachexia.  There is marked temporal wasting.  Will reassess speech and swallowing.  I suspect she has not been doing well at home since her   several mos ago. Reportedly, a step-daughter dose not wish to be involved in her DC plan.
79F w/ no known pmh BIBEMS w/ concern for STEMI s/p LHC w/ non-obstr CAD, found to have newly reduced EF suggestive of takotsubo cardiomyopathy, transferred to CCU for further mgmt    -sCHF - Acute systolic CHF 2/2 Takotsubo CM. TTE w/ EF 20% global hypokinesis and hyperkinetic bases, Moderate MR w/ LVOT obstruction. Currently euvolemic; c/w Lopressor 12.5 q12h, w/ consider Losartan 25 if BP tolerates  -Pulm - Post-obstructive PNA 2/2 impacted secretions, improving. c/w Ceftriaxone 1g IV daily, no Azithro d/t prolonged QT; c/w Chest PT, inhaled Mucomyst and Nebs PRN  -Neuro - Metabolic encephalopathy 2/2 malnourishment and PNA, currenttly improving; c/w/ TFs, passed Sp/Sw, but will need TFs currently to maintain necessary caloric intake  -c/w TFs to maintain caloric intake, Start Puree diet  -Strict Aspiration Precautions  -OOB to chair  -DVT PPx  -Dispo: Cardiac Tele; Will likely need long term placement      Dorcas Day MD  Cardiology Attending

## 2020-03-25 NOTE — PROGRESS NOTE ADULT - PROBLEM SELECTOR PLAN 3
R/T LLL collapse and likely post-obstructive PNA, HFNC transitioned to 4L humidified NC on 3/22 AM which has been titrated off to RA  3/24/20   - RVP negative  - CTPE 3/20: r/o PE, but did show LLL collapse with mucus plugging and likely post-obstructive PNA. Cannot r/o endobronchial mass on CT PE, but less likely, poor candidate for invasive w/u  - CXR 3/22: without LLL collapse, small left effusion, resolution L lung infiltrates as described  - Urine Legionella negative  - Send sputum cytology if she produces sputum  - Pulm consulted, appreciate recs including chest PT, mucomyst, aspiration precautions, frequent suctioning, aggressive pulmonary hygiene  - Ceftriaxone 1g IV QD (initiated 3/21) to complete 5 day course (day 4/5). Will restart abx if wbc increases as pt with significant pna on CT.  - Hold off on azithromycin due to prolonged QTc and low suspicion for atypical cause of PNA  - NGT and aspiration precautions in place

## 2020-03-25 NOTE — PROGRESS NOTE ADULT - SUBJECTIVE AND OBJECTIVE BOX
OVERNIGHT EVENTS:    SUBJECTIVE / INTERVAL HPI: Patient seen and examined at bedside.     VITAL SIGNS:  Vital Signs Last 24 Hrs  T(C): 36.2 (25 Mar 2020 06:25), Max: 36.2 (25 Mar 2020 06:25)  T(F): 97.2 (25 Mar 2020 06:25), Max: 97.2 (25 Mar 2020 06:25)  HR: 58 (25 Mar 2020 09:40) (52 - 72)  BP: 124/63 (25 Mar 2020 09:40) (109/57 - 129/65)  BP(mean): 87 (25 Mar 2020 09:40) (77 - 96)  RR: 19 (25 Mar 2020 09:40) (14 - 20)  SpO2: 97% (25 Mar 2020 09:40) (96% - 98%)    PHYSICAL EXAM:    General: WDWN  HEENT: NC/AT; PERRL, anicteric sclera; MMM  Neck: supple  Cardiovascular: +S1/S2; RRR  Respiratory: CTA B/L; no W/R/R  Gastrointestinal: soft, NT/ND; +BSx4  Extremities: WWP; no edema, clubbing or cyanosis  Vascular: 2+ radial, DP/PT pulses B/L  Neurological: AAOx3; no focal deficits    MEDICATIONS:  MEDICATIONS  (STANDING):  acetylcysteine 10%  Inhalation 3 milliLiter(s) Inhalation three times a day  albuterol/ipratropium for Nebulization. 3 milliLiter(s) Nebulizer three times a day  ascorbic acid 500 milliGRAM(s) Oral every 24 hours  aspirin  chewable 81 milliGRAM(s) Enteral Tube daily  atorvastatin 80 milliGRAM(s) Oral every 24 hours  dextrose 5%. 1000 milliLiter(s) (50 mL/Hr) IV Continuous <Continuous>  dextrose 50% Injectable 12.5 Gram(s) IV Push once  dextrose 50% Injectable 25 Gram(s) IV Push once  dextrose 50% Injectable 25 Gram(s) IV Push once  enoxaparin Injectable 30 milliGRAM(s) SubCutaneous every 24 hours  insulin lispro (HumaLOG) corrective regimen sliding scale   SubCutaneous Before meals and at bedtime  metoprolol tartrate 12.5 milliGRAM(s) Oral every 12 hours  multivitamin 1 Tablet(s) Oral every 24 hours    MEDICATIONS  (PRN):  dextrose 40% Gel 15 Gram(s) Oral once PRN Blood Glucose LESS THAN 70 milliGRAM(s)/deciliter  glucagon  Injectable 1 milliGRAM(s) IntraMuscular once PRN Glucose LESS THAN 70 milligrams/deciliter      ALLERGIES:  Allergies    No Known Allergies    Intolerances        LABS:                        11.4   12.48 )-----------( 137      ( 24 Mar 2020 05:47 )             35.3     03-24    141  |  106  |  16  ----------------------------<  86  3.9   |  28  |  0.35<L>    Ca    8.2<L>      24 Mar 2020 05:47  Phos  2.4     03-24  Mg     1.6     03-24      PT/INR - ( 24 Mar 2020 15:17 )   PT: 11.3 sec;   INR: 0.99          PTT - ( 24 Mar 2020 15:17 )  PTT:28.2 sec    CAPILLARY BLOOD GLUCOSE      POCT Blood Glucose.: 80 mg/dL (25 Mar 2020 06:23)      RADIOLOGY & ADDITIONAL TESTS: Reviewed. OVERNIGHT EVENTS: HOPE    SUBJECTIVE / INTERVAL HPI: Patient seen and examined at bedside. Pt does not participate in the interview. Pt denies any symptoms of cough or SOB. She is able to state her name, does not respond when asked the date or where she is.     VITAL SIGNS:  Vital Signs Last 24 Hrs  T(C): 36.2 (25 Mar 2020 06:25), Max: 36.2 (25 Mar 2020 06:25)  T(F): 97.2 (25 Mar 2020 06:25), Max: 97.2 (25 Mar 2020 06:25)  HR: 58 (25 Mar 2020 09:40) (52 - 72)  BP: 124/63 (25 Mar 2020 09:40) (109/57 - 129/65)  BP(mean): 87 (25 Mar 2020 09:40) (77 - 96)  RR: 19 (25 Mar 2020 09:40) (14 - 20)  SpO2: 97% (25 Mar 2020 09:40) (96% - 98%)    PHYSICAL EXAM:    General: thin, frail, elderly female, NAD  HEENT: NC/AT; PERRL, anicteric sclera; MMM  Neck: supple  Cardiovascular: +S1/S2; RRR  Respiratory: decreased breath sounds b/l; no W/R/R  Gastrointestinal: soft, NT/ND; +BSx4  Extremities: WWP; no edema, clubbing or cyanosis  Vascular: 2+ radial, DP/PT pulses B/L  Neurological: AAOx1 to self; follows basic commands    MEDICATIONS:  MEDICATIONS  (STANDING):  acetylcysteine 10%  Inhalation 3 milliLiter(s) Inhalation three times a day  albuterol/ipratropium for Nebulization. 3 milliLiter(s) Nebulizer three times a day  ascorbic acid 500 milliGRAM(s) Oral every 24 hours  aspirin  chewable 81 milliGRAM(s) Enteral Tube daily  atorvastatin 80 milliGRAM(s) Oral every 24 hours  dextrose 5%. 1000 milliLiter(s) (50 mL/Hr) IV Continuous <Continuous>  dextrose 50% Injectable 12.5 Gram(s) IV Push once  dextrose 50% Injectable 25 Gram(s) IV Push once  dextrose 50% Injectable 25 Gram(s) IV Push once  enoxaparin Injectable 30 milliGRAM(s) SubCutaneous every 24 hours  insulin lispro (HumaLOG) corrective regimen sliding scale   SubCutaneous Before meals and at bedtime  metoprolol tartrate 12.5 milliGRAM(s) Oral every 12 hours  multivitamin 1 Tablet(s) Oral every 24 hours    MEDICATIONS  (PRN):  dextrose 40% Gel 15 Gram(s) Oral once PRN Blood Glucose LESS THAN 70 milliGRAM(s)/deciliter  glucagon  Injectable 1 milliGRAM(s) IntraMuscular once PRN Glucose LESS THAN 70 milligrams/deciliter      ALLERGIES:  Allergies    No Known Allergies    Intolerances        LABS:                        11.4   12.48 )-----------( 137      ( 24 Mar 2020 05:47 )             35.3     03-24    141  |  106  |  16  ----------------------------<  86  3.9   |  28  |  0.35<L>    Ca    8.2<L>      24 Mar 2020 05:47  Phos  2.4     03-24  Mg     1.6     03-24      PT/INR - ( 24 Mar 2020 15:17 )   PT: 11.3 sec;   INR: 0.99          PTT - ( 24 Mar 2020 15:17 )  PTT:28.2 sec    CAPILLARY BLOOD GLUCOSE      POCT Blood Glucose.: 80 mg/dL (25 Mar 2020 06:23)      RADIOLOGY & ADDITIONAL TESTS: Reviewed.

## 2020-03-25 NOTE — PROVIDER CONTACT NOTE (CRITICAL VALUE NOTIFICATION) - SITUATION
hgb 14.2 hct 43.1 platelets 237
Patient admitted for STEMI w/ elevated WBC, hypthermia, and leukopenia

## 2020-03-25 NOTE — PROGRESS NOTE ADULT - PROBLEM SELECTOR PLAN 1
Euvolemic on exam, most likely 2/2 AG metabolic acidosis 2/2 starvation ketosis vs stress/emotional vs infection.   - EKG 3/22: 1 mm ST-elevation in V3-V4 and TWI in inferolateral leads 2/2 Takusubo cardiomyopathy.   - s/p diagnostic LHC/RHC 03/18/2020 with Dr. JACOBY Castanon: pLAD 70%, mLad 60-70%, pD1 80-90%. pLCX 30-40%, OM1/RCA with mild LI,  EF 15-20%, EDP 17, severe apical ballooning, akinesis suggestive of Takutsobo CM; RHC PA 21/10 PCWP 14. PA sat 67%. AoSat 96%. CO 2.9. CI 2.16, R groin & R radial.   - Echo 3/20/2020: MV moderately thickened, systolic anterior motion of the MV resulting in an LVOT gradient of 62.00 mmHg and severe LVOT obstruction, moderate MR, hypertrophy of the basal septum, LVEF 21%, normal basal wall motion, rest of the segments are severely hypokinetic to akinetic  -NGT in place: c/w aspirin 81 mg daily, Lipitor 80mg daily  - c/w Lopressor 12.5 mg PO BID as beneficial for Takutsobo w/ increased pre-load capability and reduction of tachycardic episodes to allow proper filling of ventricles  - No ACEi/ARB for now 2/2 SBP 90s; will initiate once BP stable  - Avoid preload reducing agents  - Palafox removed on 3/22 @ 12pm; pt. passed TOV.   - Strict I&Os, daily weights, core measures ordered, fluids via NGT    #R/O COVID19  - pt's hospital roommate COVID19+. First covid19 test negative. F/u repeat.  - pt asymptomatic  - will hold off on treatment protocol for now until test result comes back.  - f/u COVID labs

## 2020-03-25 NOTE — PROGRESS NOTE ADULT - PROBLEM SELECTOR PLAN 4
Verbalizing one word repeatedly, able to squeeze B/L UE and wiggle R toes when prompted, unable to perform EOM test, improving  -  Most likely 2/2 behavioral (recent hx of APS case involvement, reported to unkept at home) vs infection (PNA), Other etiologies that are less likely include wernicke/korsakoff (no nystagmus on exam) vs underlying dementia (CTH=microvascular ischemic changes + parenchymal volume loss) vs stroke (moving all extremities spontaneously)  - Utox & blood alcohol negative on admit  - Bcx NGTD, lactate cleared  - c/w Thiamine for possible wernicke  - Continue to hold off on MRI/neuro consult for now as appears to be improving

## 2020-03-26 DIAGNOSIS — Z51.5 ENCOUNTER FOR PALLIATIVE CARE: ICD-10-CM

## 2020-03-26 DIAGNOSIS — R53.81 OTHER MALAISE: ICD-10-CM

## 2020-03-26 DIAGNOSIS — R62.7 ADULT FAILURE TO THRIVE: ICD-10-CM

## 2020-03-26 DIAGNOSIS — I50.9 HEART FAILURE, UNSPECIFIED: ICD-10-CM

## 2020-03-26 LAB
ALBUMIN SERPL ELPH-MCNC: 2.4 G/DL — LOW (ref 3.3–5)
ALP SERPL-CCNC: 60 U/L — SIGNIFICANT CHANGE UP (ref 40–120)
ALT FLD-CCNC: 32 U/L — SIGNIFICANT CHANGE UP (ref 10–45)
ANION GAP SERPL CALC-SCNC: 8 MMOL/L — SIGNIFICANT CHANGE UP (ref 5–17)
AST SERPL-CCNC: 38 U/L — SIGNIFICANT CHANGE UP (ref 10–40)
BASOPHILS # BLD AUTO: 0.03 K/UL — SIGNIFICANT CHANGE UP (ref 0–0.2)
BASOPHILS NFR BLD AUTO: 0.3 % — SIGNIFICANT CHANGE UP (ref 0–2)
BILIRUB SERPL-MCNC: 0.6 MG/DL — SIGNIFICANT CHANGE UP (ref 0.2–1.2)
BUN SERPL-MCNC: 16 MG/DL — SIGNIFICANT CHANGE UP (ref 7–23)
CALCIUM SERPL-MCNC: 8.6 MG/DL — SIGNIFICANT CHANGE UP (ref 8.4–10.5)
CHLORIDE SERPL-SCNC: 101 MMOL/L — SIGNIFICANT CHANGE UP (ref 96–108)
CO2 SERPL-SCNC: 28 MMOL/L — SIGNIFICANT CHANGE UP (ref 22–31)
CREAT SERPL-MCNC: 0.44 MG/DL — LOW (ref 0.5–1.3)
CRP SERPL-MCNC: 0.28 MG/DL — SIGNIFICANT CHANGE UP (ref 0–0.4)
EOSINOPHIL # BLD AUTO: 0.06 K/UL — SIGNIFICANT CHANGE UP (ref 0–0.5)
EOSINOPHIL NFR BLD AUTO: 0.7 % — SIGNIFICANT CHANGE UP (ref 0–6)
FERRITIN SERPL-MCNC: 411 NG/ML — HIGH (ref 15–150)
GLUCOSE BLDC GLUCOMTR-MCNC: 125 MG/DL — HIGH (ref 70–99)
GLUCOSE BLDC GLUCOMTR-MCNC: 138 MG/DL — HIGH (ref 70–99)
GLUCOSE BLDC GLUCOMTR-MCNC: 83 MG/DL — SIGNIFICANT CHANGE UP (ref 70–99)
GLUCOSE SERPL-MCNC: 102 MG/DL — HIGH (ref 70–99)
HCT VFR BLD CALC: 40.2 % — SIGNIFICANT CHANGE UP (ref 34.5–45)
HGB BLD-MCNC: 13.1 G/DL — SIGNIFICANT CHANGE UP (ref 11.5–15.5)
IMM GRANULOCYTES NFR BLD AUTO: 1 % — SIGNIFICANT CHANGE UP (ref 0–1.5)
LDH SERPL L TO P-CCNC: 312 U/L — HIGH (ref 50–242)
LYMPHOCYTES # BLD AUTO: 1.68 K/UL — SIGNIFICANT CHANGE UP (ref 1–3.3)
LYMPHOCYTES # BLD AUTO: 18.3 % — SIGNIFICANT CHANGE UP (ref 13–44)
MAGNESIUM SERPL-MCNC: 1.8 MG/DL — SIGNIFICANT CHANGE UP (ref 1.6–2.6)
MCHC RBC-ENTMCNC: 30.3 PG — SIGNIFICANT CHANGE UP (ref 27–34)
MCHC RBC-ENTMCNC: 32.6 GM/DL — SIGNIFICANT CHANGE UP (ref 32–36)
MCV RBC AUTO: 93.1 FL — SIGNIFICANT CHANGE UP (ref 80–100)
MONOCYTES # BLD AUTO: 0.76 K/UL — SIGNIFICANT CHANGE UP (ref 0–0.9)
MONOCYTES NFR BLD AUTO: 8.3 % — SIGNIFICANT CHANGE UP (ref 2–14)
NEUTROPHILS # BLD AUTO: 6.57 K/UL — SIGNIFICANT CHANGE UP (ref 1.8–7.4)
NEUTROPHILS NFR BLD AUTO: 71.4 % — SIGNIFICANT CHANGE UP (ref 43–77)
NRBC # BLD: 0 /100 WBCS — SIGNIFICANT CHANGE UP (ref 0–0)
PHOSPHATE SERPL-MCNC: 2.1 MG/DL — LOW (ref 2.5–4.5)
PLATELET # BLD AUTO: 182 K/UL — SIGNIFICANT CHANGE UP (ref 150–400)
POTASSIUM SERPL-MCNC: 4.5 MMOL/L — SIGNIFICANT CHANGE UP (ref 3.5–5.3)
POTASSIUM SERPL-SCNC: 4.5 MMOL/L — SIGNIFICANT CHANGE UP (ref 3.5–5.3)
PROT SERPL-MCNC: 5.4 G/DL — LOW (ref 6–8.3)
RBC # BLD: 4.32 M/UL — SIGNIFICANT CHANGE UP (ref 3.8–5.2)
RBC # FLD: 15.7 % — HIGH (ref 10.3–14.5)
SODIUM SERPL-SCNC: 137 MMOL/L — SIGNIFICANT CHANGE UP (ref 135–145)
WBC # BLD: 9.19 K/UL — SIGNIFICANT CHANGE UP (ref 3.8–10.5)
WBC # FLD AUTO: 9.19 K/UL — SIGNIFICANT CHANGE UP (ref 3.8–10.5)

## 2020-03-26 PROCEDURE — 99233 SBSQ HOSP IP/OBS HIGH 50: CPT

## 2020-03-26 RX ORDER — ASCORBIC ACID 60 MG
500 TABLET,CHEWABLE ORAL EVERY 24 HOURS
Refills: 0 | Status: DISCONTINUED | OUTPATIENT
Start: 2020-03-26 | End: 2020-03-27

## 2020-03-26 RX ORDER — MAGNESIUM SULFATE 500 MG/ML
1 VIAL (ML) INJECTION ONCE
Refills: 0 | Status: COMPLETED | OUTPATIENT
Start: 2020-03-26 | End: 2020-03-26

## 2020-03-26 RX ORDER — ATORVASTATIN CALCIUM 80 MG/1
80 TABLET, FILM COATED ORAL AT BEDTIME
Refills: 0 | Status: DISCONTINUED | OUTPATIENT
Start: 2020-03-26 | End: 2020-03-27

## 2020-03-26 RX ORDER — ATORVASTATIN CALCIUM 80 MG/1
80 TABLET, FILM COATED ORAL AT BEDTIME
Refills: 0 | Status: DISCONTINUED | OUTPATIENT
Start: 2020-03-26 | End: 2020-03-26

## 2020-03-26 RX ADMIN — Medication 12.5 MILLIGRAM(S): at 09:21

## 2020-03-26 RX ADMIN — ATORVASTATIN CALCIUM 80 MILLIGRAM(S): 80 TABLET, FILM COATED ORAL at 21:13

## 2020-03-26 RX ADMIN — Medication 12.5 MILLIGRAM(S): at 21:13

## 2020-03-26 RX ADMIN — Medication 500 MILLIGRAM(S): at 18:16

## 2020-03-26 RX ADMIN — Medication 1 TABLET(S): at 19:26

## 2020-03-26 RX ADMIN — ENOXAPARIN SODIUM 30 MILLIGRAM(S): 100 INJECTION SUBCUTANEOUS at 07:03

## 2020-03-26 RX ADMIN — Medication 81 MILLIGRAM(S): at 12:38

## 2020-03-26 RX ADMIN — Medication 100 GRAM(S): at 09:20

## 2020-03-26 NOTE — CONSULT NOTE ADULT - PROBLEM SELECTOR RECOMMENDATION 4
Discussion needs to be had with patients surrogate regarding hospice services and ability to take care of patient at home. Her step daughter does not want to make medical decisions on her behalf and there appears to be a super that is involved. Palliative medicine available for any multidisciplinary meetings to be had by phone. Patient would qualify for hospice given her EF.  As discussed during the palliative IDT meeting, the patients PSSA screening did not identify any current psychosocial need or spiritual support deficits.

## 2020-03-26 NOTE — PROGRESS NOTE ADULT - PROBLEM SELECTOR PLAN 1
Euvolemic on exam, most likely 2/2 AG metabolic acidosis 2/2 starvation ketosis vs stress/emotional vs infection.   - EKG 3/22: 1 mm ST-elevation in V3-V4 and TWI in inferolateral leads 2/2 Takusubo cardiomyopathy.   - s/p diagnostic LHC/RHC 03/18/2020 with Dr. JACOBY Castanon: pLAD 70%, mLad 60-70%, pD1 80-90%. pLCX 30-40%, OM1/RCA with mild LI,  EF 15-20%, EDP 17, severe apical ballooning, akinesis suggestive of Takutsobo CM; RHC PA 21/10 PCWP 14. PA sat 67%. AoSat 96%. CO 2.9. CI 2.16, R groin & R radial.   - Echo 3/20/2020: MV moderately thickened, systolic anterior motion of the MV resulting in an LVOT gradient of 62.00 mmHg and severe LVOT obstruction, moderate MR, hypertrophy of the basal septum, LVEF 21%, normal basal wall motion, rest of the segments are severely hypokinetic to akinetic  -NGT in place: c/w aspirin 81 mg daily, Lipitor 80mg daily  - c/w Lopressor 12.5 mg PO BID as beneficial for Takutsobo w/ increased pre-load capability and reduction of tachycardic episodes to allow proper filling of ventricles  - No ACEi/ARB for now 2/2 SBP 90s; will initiate once BP stable  - Avoid preload reducing agents  - Palafox removed on 3/22 @ 12pm; pt. passed TOV.   - Strict I&Os, daily weights, core measures ordered, fluids via NGT    #R/O COVID19  - pt's hospital roommate COVID19+. First covid19 test negative. F/u repeat.  - pt asymptomatic  - will hold off on treatment protocol for now until test result comes back.  - f/u COVID labs Euvolemic on exam, most likely 2/2 AG metabolic acidosis 2/2 starvation ketosis vs stress/emotional vs infection.   - EKG 3/22: 1 mm ST-elevation in V3-V4 and TWI in inferolateral leads 2/2 Takusubo cardiomyopathy.   - s/p diagnostic LHC/RHC 03/18/2020 with Dr. JACOBY Castanon: pLAD 70%, mLad 60-70%, pD1 80-90%. pLCX 30-40%, OM1/RCA with mild LI,  EF 15-20%, EDP 17, severe apical ballooning, akinesis suggestive of Takutsobo CM; RHC PA 21/10 PCWP 14. PA sat 67%. AoSat 96%. CO 2.9. CI 2.16, R groin & R radial.   - Echo 3/20/2020: MV moderately thickened, systolic anterior motion of the MV resulting in an LVOT gradient of 62.00 mmHg and severe LVOT obstruction, moderate MR, hypertrophy of the basal septum, LVEF 21%, normal basal wall motion, rest of the segments are severely hypokinetic to akinetic  -NGT in place: c/w aspirin 81 mg daily, Lipitor 80mg daily  - c/w Lopressor 12.5 mg PO BID as beneficial for Takutsobo w/ increased pre-load capability and reduction of tachycardic episodes to allow proper filling of ventricles  - No ACEi/ARB for now 2/2 SBP 90s; will initiate once BP stable  - Avoid preload reducing agents  - Palafox removed on 3/22 @ 12pm; pt. passed TOV.   - Strict I&Os, daily weights, core measures ordered, fluids via NGT    #R/O COVID19  - pt's hospital roommate COVID19+. Pt is COVID19 negative x2.  - pt asymptomatic

## 2020-03-26 NOTE — PROGRESS NOTE ADULT - PROBLEM SELECTOR PLAN 3
R/T LLL collapse and likely post-obstructive PNA, HFNC transitioned to 4L humidified NC on 3/22 AM which has been titrated off to RA since 3/24/20   - RVP negative  - CTPE 3/20: r/o PE, but did show LLL collapse with mucus plugging and likely post-obstructive PNA. Cannot r/o endobronchial mass on CT PE, but less likely, poor candidate for invasive w/u  - CXR 3/22: without LLL collapse, small left effusion, resolution L lung infiltrates as described  - Pulm consulted, appreciate recs including chest PT, mucomyst, aspiration precautions, frequent suctioning, aggressive pulmonary hygiene  - Ceftriaxone 1g IV QD (initiated 3/21) to complete 5 day course (day 4/5). Will restart abx if wbc increases as pt with significant pna on CT.  - Hold off on azithromycin due to prolonged QTc and low suspicion for atypical cause of PNA  - NGT and aspiration precautions in place

## 2020-03-26 NOTE — PROGRESS NOTE ADULT - SUBJECTIVE AND OBJECTIVE BOX
Transfer from [address] to [facility]  Hospital course:  78 yo underweight female with FTT and unknown PMHx brought in by EMS after found down on floor for unknown period of time, found to have STEMI (in all leads) and STEMI code was activated s/p diagnostic cardiac cath 03/18/2020 with Dr Castanon revealing pLAD 70%, pD1 80-90%, no current plan for intervention, cath also revealing LVEF 15-20% with severe apical ballooning & akinesis suggestive of Takutsobo Cardiomyopathy,  pt with hypoxemic respiratory failure since admission and initial CT Chest on 3/18/2020 revealed e/o mucus plugging and small infiltrate in LLL, started on Ceftriaxone for likely post-obstructive PNA, desaturated on 03/19/2020 and CT ruled out PE but showed essentially total LLL collapse likely from mucus plugging, started on HFNC with improvement in inflation of LLL which has subsequently been titrated to NC and currently satting on RA, initially with AG metabolic acidosis 2/2 starvation and hypernatremia (now resolved) but continues with improving encephalopathy possibly in setting of infection vs. FTT vs underlying dementia/other underlying neuro issue,  NGT placed as pt has lost swallow reflex for meds/nutrition, stepped down to 5U for additional management from CCU on 3/22/20. Multiple complex SW issues, has step daughter who does not want to be involved in the care of the patient, with plan to get ethics team involved (may need guardian appointed). Palliative team consulted for further dispo planning (possible hospice). Pt with suspicion for COVID as pt. with lympophenia; elevated AST, cxr with infiltrates and pt's hospital roommate positive for COVID19.  Pt was transferred to COVID unit for r/o. Pt NEGATIVE COVID x2. Pt transferred to [facility] for further management.      OVERNIGHT EVENTS: Negative COVID19 x2    SUBJECTIVE / INTERVAL HPI: Patient seen and examined at bedside. Pt is a slightly more verbal today. Denies any chest pain, abd pain, cough, or SOB.    VITAL SIGNS:  Vital Signs Last 24 Hrs  T(C): 36.7 (26 Mar 2020 00:53), Max: 36.7 (25 Mar 2020 20:00)  T(F): 98 (26 Mar 2020 00:53), Max: 98 (25 Mar 2020 20:00)  HR: 63 (26 Mar 2020 07:19) (51 - 104)  BP: 126/63 (26 Mar 2020 07:19) (97/52 - 143/69)  BP(mean): 89 (26 Mar 2020 07:19) (71 - 101)  RR: 20 (26 Mar 2020 07:19) (12 - 24)  SpO2: 96% (26 Mar 2020 07:19) (94% - 98%)    PHYSICAL EXAM:    General: thin, frail, elderly female, NAD  HEENT: NC/AT; PERRL, anicteric sclera; MMM  Neck: supple  Cardiovascular: +S1/S2; RRR  Respiratory: decreased breath sounds b/l; no W/R/R  Gastrointestinal: soft, NT/ND; +BSx4  Extremities: WWP; no edema, clubbing or cyanosis  Vascular: 2+ radial, DP/PT pulses B/L  Neurological: AAOx1-2 to self, remembers year; follows basic commands, moves all 4 extremities    MEDICATIONS:  MEDICATIONS  (STANDING):  ascorbic acid 500 milliGRAM(s) Oral every 24 hours  aspirin  chewable 81 milliGRAM(s) Oral daily  atorvastatin 80 milliGRAM(s) Oral at bedtime  dextrose 5%. 1000 milliLiter(s) (50 mL/Hr) IV Continuous <Continuous>  dextrose 50% Injectable 12.5 Gram(s) IV Push once  dextrose 50% Injectable 25 Gram(s) IV Push once  dextrose 50% Injectable 25 Gram(s) IV Push once  enoxaparin Injectable 30 milliGRAM(s) SubCutaneous every 24 hours  insulin lispro (HumaLOG) corrective regimen sliding scale   SubCutaneous Before meals and at bedtime  metoprolol tartrate 12.5 milliGRAM(s) Oral every 12 hours  multivitamin  Chewable 1 Tablet(s) Oral every 24 hours    MEDICATIONS  (PRN):  dextrose 40% Gel 15 Gram(s) Oral once PRN Blood Glucose LESS THAN 70 milliGRAM(s)/deciliter  glucagon  Injectable 1 milliGRAM(s) IntraMuscular once PRN Glucose LESS THAN 70 milligrams/deciliter      ALLERGIES:  Allergies    No Known Allergies    Intolerances        LABS:                        13.1   9.19  )-----------( 182      ( 26 Mar 2020 07:16 )             40.2     03-26    137  |  101  |  16  ----------------------------<  102<H>  4.5   |  28  |  0.44<L>    Ca    8.6      26 Mar 2020 07:16  Phos  2.1     03-26  Mg     1.8     03-26    TPro  5.4<L>  /  Alb  2.4<L>  /  TBili  0.6  /  DBili  x   /  AST  38  /  ALT  32  /  AlkPhos  60  03-26    PT/INR - ( 25 Mar 2020 19:18 )   PT: 11.3 sec;   INR: 0.99          PTT - ( 25 Mar 2020 19:18 )  PTT:31.4 sec    CAPILLARY BLOOD GLUCOSE      POCT Blood Glucose.: 125 mg/dL (26 Mar 2020 06:35)      RADIOLOGY & ADDITIONAL TESTS: Reviewed. Transfer from [address] to [facility]  Hospital course:  80 yo underweight female with FTT and unknown PMHx brought in by EMS after found down on floor for unknown period of time, found to have STEMI (in all leads) and STEMI code was activated s/p diagnostic cardiac cath 03/18/2020 with Dr Castanon revealing pLAD 70%, pD1 80-90%, no current plan for intervention, cath also revealing LVEF 15-20% with severe apical ballooning & akinesis suggestive of Takutsobo Cardiomyopathy,  pt with hypoxemic respiratory failure since admission and initial CT Chest on 3/18/2020 revealed e/o mucus plugging and small infiltrate in LLL, started on Ceftriaxone for likely post-obstructive PNA, desaturated on 03/19/2020 and CT ruled out PE but showed essentially total LLL collapse likely from mucus plugging, started on HFNC with improvement in inflation of LLL which has subsequently been titrated to NC and currently satting on RA, initially with AG metabolic acidosis 2/2 starvation and hypernatremia (now resolved) but continues with improving encephalopathy possibly in setting of infection vs. FTT vs underlying dementia/other underlying neuro issue,  NGT placed as pt has lost swallow reflex for meds/nutrition, stepped down to 5U for additional management from CCU on 3/22/20. Multiple complex SW issues, has step daughter who does not want to be involved in the care of the patient, with plan to get ethics team involved (may need guardian appointed). Palliative team consulted for further dispo planning (possible hospice). Pt with suspicion for COVID as pt. with lympophenia; elevated AST, cxr with infiltrates and pt's hospital roommate positive for COVID19.  Pt was transferred to COVID unit for r/o. Pt NEGATIVE COVID x2. Pt transferred to [facility] for further management.      OVERNIGHT EVENTS: Negative COVID19 x2. Pt pulled her NGT out.    SUBJECTIVE / INTERVAL HPI: Patient seen and examined at bedside. Pt is a slightly more verbal today. Denies any chest pain, abd pain, cough, or SOB.    VITAL SIGNS:  Vital Signs Last 24 Hrs  T(C): 36.7 (26 Mar 2020 00:53), Max: 36.7 (25 Mar 2020 20:00)  T(F): 98 (26 Mar 2020 00:53), Max: 98 (25 Mar 2020 20:00)  HR: 63 (26 Mar 2020 07:19) (51 - 104)  BP: 126/63 (26 Mar 2020 07:19) (97/52 - 143/69)  BP(mean): 89 (26 Mar 2020 07:19) (71 - 101)  RR: 20 (26 Mar 2020 07:19) (12 - 24)  SpO2: 96% (26 Mar 2020 07:19) (94% - 98%)    PHYSICAL EXAM:    General: thin, frail, elderly female, NAD  HEENT: NC/AT; PERRL, anicteric sclera; MMM  Neck: supple  Cardiovascular: +S1/S2; RRR  Respiratory: decreased breath sounds b/l; no W/R/R  Gastrointestinal: soft, NT/ND; +BSx4  Extremities: WWP; no edema, clubbing or cyanosis  Vascular: 2+ radial, DP/PT pulses B/L  Neurological: AAOx1-2 to self, remembers year; follows basic commands, moves all 4 extremities    MEDICATIONS:  MEDICATIONS  (STANDING):  ascorbic acid 500 milliGRAM(s) Oral every 24 hours  aspirin  chewable 81 milliGRAM(s) Oral daily  atorvastatin 80 milliGRAM(s) Oral at bedtime  dextrose 5%. 1000 milliLiter(s) (50 mL/Hr) IV Continuous <Continuous>  dextrose 50% Injectable 12.5 Gram(s) IV Push once  dextrose 50% Injectable 25 Gram(s) IV Push once  dextrose 50% Injectable 25 Gram(s) IV Push once  enoxaparin Injectable 30 milliGRAM(s) SubCutaneous every 24 hours  insulin lispro (HumaLOG) corrective regimen sliding scale   SubCutaneous Before meals and at bedtime  metoprolol tartrate 12.5 milliGRAM(s) Oral every 12 hours  multivitamin  Chewable 1 Tablet(s) Oral every 24 hours    MEDICATIONS  (PRN):  dextrose 40% Gel 15 Gram(s) Oral once PRN Blood Glucose LESS THAN 70 milliGRAM(s)/deciliter  glucagon  Injectable 1 milliGRAM(s) IntraMuscular once PRN Glucose LESS THAN 70 milligrams/deciliter      ALLERGIES:  Allergies    No Known Allergies    Intolerances        LABS:                        13.1   9.19  )-----------( 182      ( 26 Mar 2020 07:16 )             40.2     03-26    137  |  101  |  16  ----------------------------<  102<H>  4.5   |  28  |  0.44<L>    Ca    8.6      26 Mar 2020 07:16  Phos  2.1     03-26  Mg     1.8     03-26    TPro  5.4<L>  /  Alb  2.4<L>  /  TBili  0.6  /  DBili  x   /  AST  38  /  ALT  32  /  AlkPhos  60  03-26    PT/INR - ( 25 Mar 2020 19:18 )   PT: 11.3 sec;   INR: 0.99          PTT - ( 25 Mar 2020 19:18 )  PTT:31.4 sec    CAPILLARY BLOOD GLUCOSE      POCT Blood Glucose.: 125 mg/dL (26 Mar 2020 06:35)      RADIOLOGY & ADDITIONAL TESTS: Reviewed.

## 2020-03-26 NOTE — PROGRESS NOTE ADULT - PROBLEM SELECTOR PLAN 1
Euvolemic on exam, most likely 2/2 AG metabolic acidosis 2/2 starvation ketosis vs stress/emotional vs infection.  EKG 3/22: 1 mm ST-elevation in V3-V4 and TWI in inferolateral leads 2/2 Takusubo cardiomyopathy. Diagnostic LHC/RHC on 03/18/2020 with Dr. JACOBY Castanon revealed pLAD 70%, mLad 60-70%, pD1 80-90%. pLCX 30-40%, OM1/RCA with mild LI,  EF 15-20%, EDP 17, severe apical ballooning, akinesis suggestive of Takutsobo CM; RHC PA 21/10 PCWP 14. PA sat 67%. AoSat 96%. CO 2.9. CI 2.16, R groin & R radial.  Echo 3/20/2020: MV moderately thickened, systolic anterior motion of the MV resulting in an LVOT gradient of 62.00 mmHg and severe LVOT obstruction, moderate MR, hypertrophy of the basal septum, LVEF 21%, normal basal wall motion, rest of the segments are severely hypokinetic to akinetic.   -c/w aspirin 81 mg daily, Lipitor 80mg daily via NGT  - c/w Lopressor 12.5 mg PO BID as beneficial for Takutsobo w/ increased pre-load capability  - Hold off on ACEi/ARB for now 2/2 SBP 90s; will initiate once BP stable  - Strict I&Os, daily weights, core measures ordered, fluids via NGT    #R/O COVID19  - pt's hospital roommate COVID19+. COVID negative 2x  - pt asymptomatic, no increased work of breathing noted on exam

## 2020-03-26 NOTE — CONSULT NOTE ADULT - SUBJECTIVE AND OBJECTIVE BOX
LORA PINEDA          MRN-[id number]            (1940)    HPI:  HPI: 80 yo F unknown PMH brought in by EMS w/ STEMI. Pt taken straight to cath lab on arrival. Pt was not able to provide hx, and no other hx provided. Pt was put on non-rebreather mask by EMS and taken straight to cath lab. There were no contacts listed or documented home address. Home address was written as hospital address by EMS. No home or family numbers listed.  Vitals: 96.4 F, , 162/96--->90/54, RR 15-20, spo2=97-98 on non-rebreather (15L/min)  Labs drawn during Cath: WBC 14.7, lymphocytes 0.78 (low), Hgb 15.9, INR=1.47, Na 149, K=3.1, HCO3-18, AG=24, BUN=62, Ca 10.7, AST=65, lactate dehydrogenase 383, troponin 1.10, creatine kinase 193, ABG PH=7.39 PO2=93 PCO2=34   Imaging: CXR neg for infiltrate, however hyperinflated (18 Mar 2020 19:15)      PAST MEDICAL & SURGICAL HISTORY:      FAMILY HISTORY:   Reviewed and found non contributory in mother or father    SOCIAL HISTORY: Unknown    ROS:    Unable to attain due to:  Cognitive impairment     Dyspnea (Dawson 0-10):      n/a                   N/V (Y/N):               n/a                              Secretions (Y/N) :                n/a               Agitation(Y/N):    n/a              Pain (Y/N):        n/a              -Provocation/Palliation:    n/a              -Quality/Quantity:    n/a              -Radiating:    n/a              -Severity:    n/a              -Timing/Frequency:    n/a              -Impact on ADLs:   n/a                General:     n/a              HEENT:      n/a              Neck:    n/a              CVS:     n/a              Resp:     n/a              GI:     n/a              :     n/a              Musc:     n/a              Neuro:    n/a              Psych:     n/a              Skin:     n/a              Lymph:     n/a                Allergies    No Known Allergies    Intolerances      Opiate Naive (Y/N): Y  -iStop reviewed (Y/N): (Ref#:     [id number]      )    Medications:      MEDICATIONS  (STANDING):  ascorbic acid 500 milliGRAM(s) Oral every 24 hours  aspirin  chewable 81 milliGRAM(s) Oral daily  atorvastatin 80 milliGRAM(s) Oral at bedtime  dextrose 5%. 1000 milliLiter(s) (50 mL/Hr) IV Continuous <Continuous>  dextrose 50% Injectable 12.5 Gram(s) IV Push once  dextrose 50% Injectable 25 Gram(s) IV Push once  dextrose 50% Injectable 25 Gram(s) IV Push once  enoxaparin Injectable 30 milliGRAM(s) SubCutaneous every 24 hours  insulin lispro (HumaLOG) corrective regimen sliding scale   SubCutaneous Before meals and at bedtime  metoprolol tartrate 12.5 milliGRAM(s) Oral every 12 hours  multivitamin  Chewable 1 Tablet(s) Oral every 24 hours    MEDICATIONS  (PRN):  dextrose 40% Gel 15 Gram(s) Oral once PRN Blood Glucose LESS THAN 70 milliGRAM(s)/deciliter  glucagon  Injectable 1 milliGRAM(s) IntraMuscular once PRN Glucose LESS THAN 70 milligrams/deciliter      Labs:    CBC:                        13.1   9.19  )-----------( 182      ( 26 Mar 2020 07:16 )             40.2     CMP:        137  |  101  |  16  ----------------------------<  102<H>  4.5   |  28  |  0.44<L>    Ca    8.6      26 Mar 2020 07:16  Phos  2.1       Mg     1.8         TPro  5.4<L>  /  Alb  2.4<L>  /  TBili  0.6  /  DBili  x   /  AST  38  /  ALT  32  /  AlkPhos  60      PT/INR - ( 25 Mar 2020 19:18 )   PT: 11.3 sec;   INR: 0.99          PTT - ( 25 Mar 2020 19:18 )  PTT:31.4 sec      Imaging:     < from: CT Angio Chest PE Protocol w/ IV Cont (20 @ 15:31) >  EXAM:  CT ANGIO CHEST PE PROTOCOL IC                          PROCEDURE DATE:  2020    IMPRESSION: No evidence of PE.  Worsening of lung findings in the left lower lobe. Please see the report of the prior CT chest dated 3/18/2020. There is now evidence of near complete consolidation collapse left lower lobe. An endobronchial lesion cannot be ruled out. Could represent (postobstructive) pneumonia. Trace left pleural effusion.      < from: Xray Chest 1 View- PORTABLE-Routine (20 @ 06:13) >  EXAM:  XR CHEST PORTABLE ROUTINE 1V                          PROCEDURE DATE:  2020          INTERPRETATION:  CLINICAL INDICATION: 79-year-old with post obstructive pneumonia. Left basilar collapse.      IMPRESSION: Frontal view of the chest is compared to 3/22/2020 and demonstrates enteric tube tip within the stomach. Normal heart size. Persistent dense left basilar consolidation. Interval enlargement of mild layering right pleural effusion. Curvature of the thoracic spine.    < end of copied text >        PEx:  T(C): 37.2 (20 @ 09:00), Max: 37.2 (20 @ 09:00)  HR: 87 (20 @ 09:00) (51 - 104)  BP: 103/59 (20 @ 09:00) (97/52 - 143/69)  RR: 20 (20 @ 09:00) (12 - 24)  SpO2: 96% (20 @ 09:00) (94% - 98%)  Wt(kg): --  Daily     Daily   CAPILLARY BLOOD GLUCOSE      POCT Blood Glucose.: 125 mg/dL (26 Mar 2020 06:35)    I&O's Summary    25 Mar 2020 07:01  -  26 Mar 2020 07:00  --------------------------------------------------------  IN: 537 mL / OUT: 0 mL / NET: 537 mL      Given COVID positive patients and Constraints on Palliative personal physical exam deferred and based on primary teams documentation.     PHYSICAL EXAM:  General: thin, frail, elderly female, NAD  HEENT: NC/AT; PERRL, anicteric sclera; MMM  Neck: supple  Cardiovascular: +S1/S2; RRR  Respiratory: decreased breath sounds b/l; no W/R/R  Gastrointestinal: soft, NT/ND; +BSx4  Extremities: WWP; no edema, clubbing or cyanosis  Vascular: 2+ radial, DP/PT pulses B/L  Neurological: AAOx1 to self; follows basic commands    Preadmit Karnofsky:  60%           Current Karnofsky:     30%  Cachexia (Y/N):  Y  BMI: 15.8    Advanced Directives:     Full Code       Decision maker: Kirill [phone]  Legal surrogate: Kirill [phone]      There is a step daughter who does not want to be involved in any medical decision making     GOALS OF CARE DISCUSSION             Documentation of GOC: 	Full           REFERRALS	        Unit SW/Case Mgmt        LORA PINEDA          MRN-[id number]            (1940)    HPI:  HPI: 80 yo F unknown PMH brought in by EMS w/ STEMI. Pt taken straight to cath lab on arrival. Pt was not able to provide hx, and no other hx provided. Pt was put on non-rebreather mask by EMS and taken straight to cath lab. There were no contacts listed or documented home address. Home address was written as hospital address by EMS. No home or family numbers listed.  Vitals: 96.4 F, , 162/96--->90/54, RR 15-20, spo2=97-98 on non-rebreather (15L/min)  Labs drawn during Cath: WBC 14.7, lymphocytes 0.78 (low), Hgb 15.9, INR=1.47, Na 149, K=3.1, HCO3-18, AG=24, BUN=62, Ca 10.7, AST=65, lactate dehydrogenase 383, troponin 1.10, creatine kinase 193, ABG PH=7.39 PO2=93 PCO2=34   Imaging: CXR neg for infiltrate, however hyperinflated (18 Mar 2020 19:15)      PAST MEDICAL & SURGICAL HISTORY:      FAMILY HISTORY:   Reviewed and found non contributory in mother or father    SOCIAL HISTORY: Unknown    ROS:    Unable to attain due to:  Cognitive impairment     Dyspnea (Dawson 0-10):      n/a                   N/V (Y/N):               n/a                              Secretions (Y/N) :                n/a               Agitation(Y/N):    n/a              Pain (Y/N):        n/a              -Provocation/Palliation:    n/a              -Quality/Quantity:    n/a              -Radiating:    n/a              -Severity:    n/a              -Timing/Frequency:    n/a              -Impact on ADLs:   n/a                General:     n/a              HEENT:      n/a              Neck:    n/a              CVS:     n/a              Resp:     n/a              GI:     n/a              :     n/a              Musc:     n/a              Neuro:    n/a              Psych:     n/a              Skin:     n/a              Lymph:     n/a                Allergies    No Known Allergies    Intolerances      Opiate Naive (Y/N): Y  -iStop reviewed (Y/N): (Ref#:     [id number]      )    Medications:      MEDICATIONS  (STANDING):  ascorbic acid 500 milliGRAM(s) Oral every 24 hours  aspirin  chewable 81 milliGRAM(s) Oral daily  atorvastatin 80 milliGRAM(s) Oral at bedtime  dextrose 5%. 1000 milliLiter(s) (50 mL/Hr) IV Continuous <Continuous>  dextrose 50% Injectable 12.5 Gram(s) IV Push once  dextrose 50% Injectable 25 Gram(s) IV Push once  dextrose 50% Injectable 25 Gram(s) IV Push once  enoxaparin Injectable 30 milliGRAM(s) SubCutaneous every 24 hours  insulin lispro (HumaLOG) corrective regimen sliding scale   SubCutaneous Before meals and at bedtime  metoprolol tartrate 12.5 milliGRAM(s) Oral every 12 hours  multivitamin  Chewable 1 Tablet(s) Oral every 24 hours    MEDICATIONS  (PRN):  dextrose 40% Gel 15 Gram(s) Oral once PRN Blood Glucose LESS THAN 70 milliGRAM(s)/deciliter  glucagon  Injectable 1 milliGRAM(s) IntraMuscular once PRN Glucose LESS THAN 70 milligrams/deciliter      Labs:    CBC:                        13.1   9.19  )-----------( 182      ( 26 Mar 2020 07:16 )             40.2     CMP:        137  |  101  |  16  ----------------------------<  102<H>  4.5   |  28  |  0.44<L>    Ca    8.6      26 Mar 2020 07:16  Phos  2.1       Mg     1.8         TPro  5.4<L>  /  Alb  2.4<L>  /  TBili  0.6  /  DBili  x   /  AST  38  /  ALT  32  /  AlkPhos  60      PT/INR - ( 25 Mar 2020 19:18 )   PT: 11.3 sec;   INR: 0.99          PTT - ( 25 Mar 2020 19:18 )  PTT:31.4 sec      Imaging:     < from: CT Angio Chest PE Protocol w/ IV Cont (20 @ 15:31) >  EXAM:  CT ANGIO CHEST PE PROTOCOL IC                          PROCEDURE DATE:  2020    IMPRESSION: No evidence of PE.  Worsening of lung findings in the left lower lobe. Please see the report of the prior CT chest dated 3/18/2020. There is now evidence of near complete consolidation collapse left lower lobe. An endobronchial lesion cannot be ruled out. Could represent (postobstructive) pneumonia. Trace left pleural effusion.      < from: Xray Chest 1 View- PORTABLE-Routine (20 @ 06:13) >  EXAM:  XR CHEST PORTABLE ROUTINE 1V                          PROCEDURE DATE:  2020          INTERPRETATION:  CLINICAL INDICATION: 79-year-old with post obstructive pneumonia. Left basilar collapse.      IMPRESSION: Frontal view of the chest is compared to 3/22/2020 and demonstrates enteric tube tip within the stomach. Normal heart size. Persistent dense left basilar consolidation. Interval enlargement of mild layering right pleural effusion. Curvature of the thoracic spine.    < end of copied text >    < from: TTE Echo Complete w/ Contrast w/ Doppler (20 @ 09:29) >    EXAM:  ECHO TTE WITH CON COMP W DOPP                          PROCEDURE DATE:  2020  Left Ventricle:  There is hypertrophy of the basal septum. Left ventricular ejection fraction is 21%. Normal basal wall motion. Rest of the segments are severely hypokinetic toakinetic.    < end of copied text >      PEx:  T(C): 37.2 (20 @ 09:00), Max: 37.2 (20 @ 09:00)  HR: 87 (20 @ 09:00) (51 - 104)  BP: 103/59 (20 @ 09:00) (97/52 - 143/69)  RR: 20 (20 @ 09:00) (12 - 24)  SpO2: 96% (20 @ 09:00) (94% - 98%)  Wt(kg): --  Daily     Daily   CAPILLARY BLOOD GLUCOSE      POCT Blood Glucose.: 125 mg/dL (26 Mar 2020 06:35)    I&O's Summary    25 Mar 2020 07:01  -  26 Mar 2020 07:00  --------------------------------------------------------  IN: 537 mL / OUT: 0 mL / NET: 537 mL      Given COVID positive patients and Constraints on Palliative personal physical exam deferred and based on primary teams documentation.     PHYSICAL EXAM:  General: thin, frail, elderly female, NAD  HEENT: NC/AT; PERRL, anicteric sclera; MMM  Neck: supple  Cardiovascular: +S1/S2; RRR  Respiratory: decreased breath sounds b/l; no W/R/R  Gastrointestinal: soft, NT/ND; +BSx4  Extremities: WWP; no edema, clubbing or cyanosis  Vascular: 2+ radial, DP/PT pulses B/L  Neurological: AAOx1 to self; follows basic commands    Preadmit Karnofsky:  60%           Current Karnofsky:     30%  Cachexia (Y/N):  Y  BMI: 15.8    Advanced Directives:     Full Code       Decision maker: Kirill [phone]  Legal surrogate: Roy [phone]      There is a step daughter who does not want to be involved in any medical decision making     GOALS OF CARE DISCUSSION             Documentation of GOC: 	Full           REFERRALS	        Unit SW/Case John Hale

## 2020-03-26 NOTE — CONSULT NOTE ADULT - PROBLEM SELECTOR RECOMMENDATION 2
Current albumin in 2.4 which is a decline from 3.3 when she was admitted. Current albumin in 2.4 which is a decline from 3.3 when she was admitted. Appreciate dietary involvement. Continue care as per primary team.

## 2020-03-26 NOTE — PROGRESS NOTE ADULT - PROBLEM SELECTOR PLAN 8
Cachectic, underweight, anorexic BMI=15.8, multiple pressure ulcers on body, RD/ SLP/SW following  - Most likely has not been taken care of, no recorded home address or family contacts.   - Puree w/ thin liquids diet initiated on 3/22 as per SLP eval with supplemental TFs. c/w Jevity 1.2 via NGT @ 40 cc/hr.  Pt. received entire Jevity 3/23 with plan to see how pt. does with oral feeds on 3/24 and then decide if NGT still required.  - Calorie count ordered and to be complete by dietitian prior to removing NGT d/t concerns pt may not meet nutritional needs.   - Monitor electrolytes (including Phos) closely for refeeding syndrome and replete PRN. Cachectic, underweight, anorexic BMI=15.8, multiple pressure ulcers on body, RD/ SLP/SW following  - Most likely has not been taken care of, no recorded home address or family contacts.   - On dysphagia diet. Was on tube feeds.  - Calorie count ordered and to be complete by dietitian. pt may not be meeting nutritional needs.   - Monitor electrolytes (including Phos) closely for refeeding syndrome and replete PRN.

## 2020-03-26 NOTE — PROGRESS NOTE ADULT - ASSESSMENT
78 yo underweight female with FTT and unknown PMHx brought in by EMS after found down on floor for unknown period of time, found to have STEMI (in all leads) and STEMI code was activated s/p diagnostic cardiac cath 03/18/2020 with Dr Castanon revealing pLAD 70%, pD1 80-90%, no current plan for intervention, cath also revealing LVEF 15-20% with severe apical ballooning & akinesis suggestive of Takutsobo Cardiomyopathy, pt with hypoxemic respiratory failure since admission Palliative team consulted for further dispo planning (possible hospice). Pt with high suspicion for COVID as pt. with lympophenia; elevated AST, cxr with infiltrates.  Pt. is was transferred to COVID unit for further management now COVID neg x2. Transferred to F for further management and placement.

## 2020-03-26 NOTE — PROGRESS NOTE ADULT - SUBJECTIVE AND OBJECTIVE BOX
Hospital course:  80 yo underweight female with FTT and unknown PMHx brought in by EMS after found down on floor for unknown period of time, found to have STEMI (in all leads) and STEMI code was activated s/p diagnostic cardiac cath 03/18/2020 with Dr Castanon revealing pLAD 70%, pD1 80-90%, no current plan for intervention, cath also revealing LVEF 15-20% with severe apical ballooning & akinesis suggestive of Takutsobo Cardiomyopathy,  pt with hypoxemic respiratory failure since admission and initial CT Chest on 3/18/2020 revealed e/o mucus plugging and small infiltrate in LLL, started on Ceftriaxone for likely post-obstructive PNA, desaturated on 03/19/2020 and CT ruled out PE but showed essentially total LLL collapse likely from mucus plugging, started on HFNC with improvement in inflation of LLL which has subsequently been titrated to NC and currently satting on RA, initially with AG metabolic acidosis 2/2 starvation and hypernatremia (now resolved) but continues with improving encephalopathy possibly in setting of infection vs. FTT vs underlying dementia/other underlying neuro issue,  NGT placed as pt has lost swallow reflex for meds/nutrition, stepped down to 5U for additional management from CCU on 3/22/20. Multiple complex SW issues, has step daughter who does not want to be involved in the care of the patient, with plan to get ethics team involved (may need guardian appointed). Palliative team consulted for further dispo planning (possible hospice). Pt with suspicion for COVID as pt. with lympophenia; elevated AST, cxr with infiltrates and pt's hospital roommate positive for COVID19.  Pt was transferred to COVID unit for r/o. Pt NEGATIVE COVID x2. Pt transferred to F for further management.      SUBJECTIVE / INTERVAL HPI: Patient seen and examined at bedside. Patient is unresponsive to questions but follows simple commands. Appears to be in no acute distress. Tracks movement with eyes and looks when name is called, but is nonverbal at time of exam. Unable to participate in review of systems.     VITAL SIGNS:  Vital Signs Last 24 Hrs  T(C): 37.2 (26 Mar 2020 09:00), Max: 37.2 (26 Mar 2020 09:00)  T(F): 98.9 (26 Mar 2020 09:00), Max: 98.9 (26 Mar 2020 09:00)  HR: 87 (26 Mar 2020 09:00) (51 - 104)  BP: 103/59 (26 Mar 2020 09:00) (97/52 - 143/69)  BP(mean): 76 (26 Mar 2020 09:00) (71 - 101)  RR: 20 (26 Mar 2020 09:00) (12 - 24)  SpO2: 96% (26 Mar 2020 09:00) (94% - 98%)    PHYSICAL EXAM:  General: Frail, underweight, no acute distress  HEENT: NGT secured in place, MMM, PERRL, EOMI  Neck: supple, no JVD appreciated  Cardiovascular: +S1/S2; RRR  Respiratory: CTA B/L; no W/R/R  Gastrointestinal: soft, NT/ND, catectic   Extremities: WWP; no edema, clubbing or cyanosis, cathectic appearance   Vascular: 2+ radial, DP/PT pulses B/L  Neurological: AAOx0 at time of exam, nonverbal but follows simple commands     MEDICATIONS:  MEDICATIONS  (STANDING):  ascorbic acid 500 milliGRAM(s) Oral every 24 hours  aspirin  chewable 81 milliGRAM(s) Oral daily  atorvastatin 80 milliGRAM(s) Oral at bedtime  dextrose 5%. 1000 milliLiter(s) (50 mL/Hr) IV Continuous <Continuous>  dextrose 50% Injectable 12.5 Gram(s) IV Push once  dextrose 50% Injectable 25 Gram(s) IV Push once  dextrose 50% Injectable 25 Gram(s) IV Push once  enoxaparin Injectable 30 milliGRAM(s) SubCutaneous every 24 hours  insulin lispro (HumaLOG) corrective regimen sliding scale   SubCutaneous Before meals and at bedtime  metoprolol tartrate 12.5 milliGRAM(s) Oral every 12 hours  multivitamin  Chewable 1 Tablet(s) Oral every 24 hours    MEDICATIONS  (PRN):  dextrose 40% Gel 15 Gram(s) Oral once PRN Blood Glucose LESS THAN 70 milliGRAM(s)/deciliter  glucagon  Injectable 1 milliGRAM(s) IntraMuscular once PRN Glucose LESS THAN 70 milligrams/deciliter      ALLERGIES:  Allergies    No Known Allergies    Intolerances        LABS:                        13.1   9.19  )-----------( 182      ( 26 Mar 2020 07:16 )             40.2     03-26    137  |  101  |  16  ----------------------------<  102<H>  4.5   |  28  |  0.44<L>    Ca    8.6      26 Mar 2020 07:16  Phos  2.1     03-26  Mg     1.8     03-26    TPro  5.4<L>  /  Alb  2.4<L>  /  TBili  0.6  /  DBili  x   /  AST  38  /  ALT  32  /  AlkPhos  60  03-26    PT/INR - ( 25 Mar 2020 19:18 )   PT: 11.3 sec;   INR: 0.99          PTT - ( 25 Mar 2020 19:18 )  PTT:31.4 sec    CAPILLARY BLOOD GLUCOSE      POCT Blood Glucose.: 125 mg/dL (26 Mar 2020 06:35)      RADIOLOGY & ADDITIONAL TESTS: Reviewed. Hospital course:  78 yo underweight female with FTT and unknown PMHx brought in by EMS after found down on floor for unknown period of time, found to have STEMI (in all leads) and STEMI code was activated s/p diagnostic cardiac cath 03/18/2020 with Dr Castanon revealing pLAD 70%, pD1 80-90%, no current plan for intervention, cath also revealing LVEF 15-20% with severe apical ballooning & akinesis suggestive of Takutsobo Cardiomyopathy,  pt with hypoxemic respiratory failure since admission and initial CT Chest on 3/18/2020 revealed e/o mucus plugging and small infiltrate in LLL, started on Ceftriaxone for likely post-obstructive PNA, desaturated on 03/19/2020 and CT ruled out PE but showed essentially total LLL collapse likely from mucus plugging, started on HFNC with improvement in inflation of LLL which has subsequently been titrated to NC and currently satting on RA, initially with AG metabolic acidosis 2/2 starvation and hypernatremia (now resolved) but continues with improving encephalopathy possibly in setting of infection vs. FTT vs underlying dementia/other underlying neuro issue,  NGT placed as pt has lost swallow reflex for meds/nutrition, stepped down to 5U for additional management from CCU on 3/22/20. Multiple complex SW issues, has step daughter who does not want to be involved in the care of the patient, with plan to get ethics team involved (may need guardian appointed). Palliative team consulted for further dispo planning (possible hospice). Pt with suspicion for COVID as pt. with lympophenia; elevated AST, cxr with infiltrates and pt's hospital roommate positive for COVID19.  Pt was transferred to COVID unit for r/o. Pt NEGATIVE COVID x2. Pt transferred to F for further management.      SUBJECTIVE / INTERVAL HPI: Patient seen and examined at bedside. Patient is unresponsive to questions but follows simple commands. Appears to be in no acute distress. Tracks movement with eyes and looks when name is called, but is nonverbal at time of exam. Unable to participate in review of systems.     VITAL SIGNS:  Vital Signs Last 24 Hrs  T(C): 37.2 (26 Mar 2020 09:00), Max: 37.2 (26 Mar 2020 09:00)  T(F): 98.9 (26 Mar 2020 09:00), Max: 98.9 (26 Mar 2020 09:00)  HR: 87 (26 Mar 2020 09:00) (51 - 104)  BP: 103/59 (26 Mar 2020 09:00) (97/52 - 143/69)  BP(mean): 76 (26 Mar 2020 09:00) (71 - 101)  RR: 20 (26 Mar 2020 09:00) (12 - 24)  SpO2: 96% (26 Mar 2020 09:00) (94% - 98%)    PHYSICAL EXAM:  General: Frail, underweight, no acute distress  HEENT: NGT secured in place, MMM, PERRL, EOMI  Neck: supple, no JVD appreciated  Cardiovascular: +S1/S2; RRR  Respiratory: CTA B/L; no W/R/R  Gastrointestinal: soft, NT/ND, catectic   Extremities: WWP; no edema, clubbing or cyanosis, cathectic appearance   Vascular: 2+ radial, DP/PT pulses B/L  Neurological: AAOx0 at time of exam, nonverbal but follows simple commands     MEDICATIONS:  MEDICATIONS  (STANDING):  ascorbic acid 500 milliGRAM(s) Oral every 24 hours  aspirin  chewable 81 milliGRAM(s) Oral daily  atorvastatin 80 milliGRAM(s) Oral at bedtime  dextrose 5%. 1000 milliLiter(s) (50 mL/Hr) IV Continuous <Continuous>  dextrose 50% Injectable 12.5 Gram(s) IV Push once  dextrose 50% Injectable 25 Gram(s) IV Push once  dextrose 50% Injectable 25 Gram(s) IV Push once  enoxaparin Injectable 30 milliGRAM(s) SubCutaneous every 24 hours  insulin lispro (HumaLOG) corrective regimen sliding scale   SubCutaneous Before meals and at bedtime  metoprolol tartrate 12.5 milliGRAM(s) Oral every 12 hours  multivitamin  Chewable 1 Tablet(s) Oral every 24 hours    MEDICATIONS  (PRN):  dextrose 40% Gel 15 Gram(s) Oral once PRN Blood Glucose LESS THAN 70 milliGRAM(s)/deciliter  glucagon  Injectable 1 milliGRAM(s) IntraMuscular once PRN Glucose LESS THAN 70 milligrams/deciliter      ALLERGIES: NKDA                        13.1   9.19  )-----------( 182      ( 26 Mar 2020 07:16 )             40.2     137  |  101  |  16  ----------------------------<  102<H>  4.5   |  28  |  0.44<L>    Ca    8.6      26 Mar 2020 07:16  Phos  2.1     03-26  Mg     1.8     03-26    TPro  5.4<L>  /  Alb  2.4<L>  /  TBili  0.6  /  DBili  x   /  AST  38  /  ALT  32  /  AlkPhos  60  03-26    PT/INR - ( 25 Mar 2020 19:18 )   PT: 11.3 sec;   INR: 0.99     PTT - ( 25 Mar 2020 19:18 )  PTT:31.4 sec      POCT Blood Glucose.: 125 mg/dL (26 Mar 2020 06:35)      RADIOLOGY & ADDITIONAL TESTS: Reviewed.

## 2020-03-26 NOTE — PROGRESS NOTE ADULT - PROBLEM SELECTOR PLAN 7
Unclear as to why pt can't initiate swallowing reflex, most likely 2/2 encephalopathy  - SLP evaluated pt on 3/22 and diet advanced to pureed diet w/ thin liquids with supplemental TFs; pt. tolerating oral feeds yesterday; SLP to evaluate today 3/24 if TF can be removed.    - Requires 100% supervision/feeding assistance & extra time to swallow. Cue to swallow as needed. Ensure oral cavity is clear prior to administering next bite or sip, upright position during and 30 min. follow meals. Misc orders placed. Unclear as to why pt can't initiate swallowing reflex, most likely 2/2 encephalopathy  - SLP evaluated pt on 3/22 and diet advanced to pureed diet w/ thin liquids with supplemental TFs; pt. tolerating oral feeds yesterday; Pt pulled NGT out. Tolerating PO.   - Requires 100% supervision/feeding assistance & extra time to swallow. Cue to swallow as needed. Ensure oral cavity is clear prior to administering next bite or sip, upright position during and 30 min. follow meals. Misc orders placed.

## 2020-03-26 NOTE — CONSULT NOTE ADULT - PROBLEM SELECTOR RECOMMENDATION 9
PPS 30%. Skin care PRN. Assist with ADL's PRN.
-The CT scans were reviewed. There was no josi mass or endobronchial lesion seen on the initial CT however there was evidence of mucus plugging. The repeat CT scan shows collapse of the LLL and mucus within the CORWIN bronchus and Left mainstem bronchus. Likely there is post obstructive pneumonia.   -There is a possibility there could be an underlying endobronchial lesion however given the fact that pneumonia is more likely at this point and given patients severe debility and frailty, it would be reasonable to treat for PNA for now.   -The patient is high risk for CORWIN collapse.     Recommend:  -At this time will not offer bronchoscopy as this patient is quite frail, would require intubation and would be considered high risk. Would favor conservative measures at this point w/ Abx and pulmonary toilet.   -Pt needs diligent pulmonary toilet. Would recommend starting Mucomyst to help with thinning her mucus and would couple this with frequent NT suctioning.   -Pt needs to have CPT via bed percussion q4 hours.   -Agree w/ ABX w/ Ceftriaxone to complete a 5-7 day course.   -Pt is quite frail, seems to have a very difficult social situation and poor quality of life, Goals of care discussions seem appropriate in this setting.

## 2020-03-26 NOTE — PROGRESS NOTE ADULT - ASSESSMENT
80 yo underweight female with FTT and unknown PMHx brought in by EMS after found down on floor for unknown period of time, found to have STEMI (in all leads) and STEMI code was activated s/p diagnostic cardiac cath 03/18/2020 with Dr Castanon revealing pLAD 70%, pD1 80-90%, no current plan for intervention, cath also revealing LVEF 15-20% with severe apical ballooning & akinesis suggestive of Takutsobo Cardiomyopathy, pt with hypoxemic respiratory failure since admission Palliative team consulted for further dispo planning (possible hospice). Pt with high suspicion for COVID as pt. with lympophenia; elevated AST, cxr with infiltrates.  Pt. is was transferred to COVID unit for further management now COVID neg x2. Transferred to F for further management.

## 2020-03-26 NOTE — CONSULT NOTE ADULT - PROBLEM SELECTOR RECOMMENDATION 3
-As noted above.
Patient has a EF of about 21% as per her last TTE. She would be a good candidate for hospice services, decision about home or inpatient needs to be had with patients surrogate.

## 2020-03-26 NOTE — PROGRESS NOTE ADULT - PROBLEM SELECTOR PLAN 3
R/T LLL collapse and likely post-obstructive PNA, HFNC transitioned to 4L humidified NC on 3/22 AM which has been titrated off to RA  3/24/20   - RVP negative  - CTPE 3/20: r/o PE, but did show LLL collapse with mucus plugging and likely post-obstructive PNA. Cannot r/o endobronchial mass on CT PE, but less likely, poor candidate for invasive w/u  - CXR 3/22: without LLL collapse, small left effusion, resolution L lung infiltrates as described  - Urine Legionella negative  - Send sputum cytology if she produces sputum  - Pulm consulted, appreciate recs including chest PT, mucomyst, aspiration precautions, frequent suctioning, aggressive pulmonary hygiene  - Ceftriaxone 1g IV QD (initiated 3/21) to complete 5 day course (day 4/5). Will restart abx if wbc increases as pt with significant pna on CT.  - Hold off on azithromycin due to prolonged QTc and low suspicion for atypical cause of PNA  - NGT and aspiration precautions in place R/T LLL collapse and likely post-obstructive PNA, HFNC transitioned to 4L humidified NC on 3/22 AM which has been titrated off to RA 3/24/20   - RVP negative  - CTPE 3/20: r/o PE, but did show LLL collapse with mucus plugging and likely post-obstructive PNA. Cannot r/o endobronchial mass on CT PE, but less likely, poor candidate for invasive w/u  - CXR 3/22: without LLL collapse, small left effusion, resolution L lung infiltrates as described  - Urine Legionella negative  - Send sputum cytology if she produces sputum  - Pulm consulted, appreciate recs including chest PT, mucomyst, aspiration precautions, frequent suctioning, aggressive pulmonary hygiene  - Ceftriaxone 1g IV QD (initiated 3/21) to completed 5 day course.  - Hold off on azithromycin due to prolonged QTc and low suspicion for atypical cause of PNA  - aspiration precautions in place

## 2020-03-26 NOTE — PROGRESS NOTE ADULT - PROBLEM SELECTOR PLAN 10
- CT chest 3/18: Few calcifications are seen in the breasts bilaterally.  - Refer for outpatient mammogram    Dispo: pending clinical progression; R/O COVID  VTE ppx: Lovenox

## 2020-03-27 ENCOUNTER — TRANSCRIPTION ENCOUNTER (OUTPATIENT)
Age: 80
End: 2020-03-27

## 2020-03-27 VITALS
SYSTOLIC BLOOD PRESSURE: 97 MMHG | TEMPERATURE: 98 F | DIASTOLIC BLOOD PRESSURE: 61 MMHG | RESPIRATION RATE: 18 BRPM | OXYGEN SATURATION: 96 % | HEART RATE: 83 BPM

## 2020-03-27 LAB
ANION GAP SERPL CALC-SCNC: 6 MMOL/L — SIGNIFICANT CHANGE UP (ref 5–17)
BUN SERPL-MCNC: 13 MG/DL — SIGNIFICANT CHANGE UP (ref 7–23)
CALCIUM SERPL-MCNC: 8.4 MG/DL — SIGNIFICANT CHANGE UP (ref 8.4–10.5)
CHLORIDE SERPL-SCNC: 102 MMOL/L — SIGNIFICANT CHANGE UP (ref 96–108)
CO2 SERPL-SCNC: 29 MMOL/L — SIGNIFICANT CHANGE UP (ref 22–31)
CREAT SERPL-MCNC: 0.37 MG/DL — LOW (ref 0.5–1.3)
GLUCOSE BLDC GLUCOMTR-MCNC: 93 MG/DL — SIGNIFICANT CHANGE UP (ref 70–99)
GLUCOSE BLDC GLUCOMTR-MCNC: 99 MG/DL — SIGNIFICANT CHANGE UP (ref 70–99)
GLUCOSE SERPL-MCNC: 94 MG/DL — SIGNIFICANT CHANGE UP (ref 70–99)
HCT VFR BLD CALC: 33.5 % — LOW (ref 34.5–45)
HGB BLD-MCNC: 11.2 G/DL — LOW (ref 11.5–15.5)
MAGNESIUM SERPL-MCNC: 2 MG/DL — SIGNIFICANT CHANGE UP (ref 1.6–2.6)
MCHC RBC-ENTMCNC: 30.3 PG — SIGNIFICANT CHANGE UP (ref 27–34)
MCHC RBC-ENTMCNC: 33.4 GM/DL — SIGNIFICANT CHANGE UP (ref 32–36)
MCV RBC AUTO: 90.5 FL — SIGNIFICANT CHANGE UP (ref 80–100)
NRBC # BLD: 0 /100 WBCS — SIGNIFICANT CHANGE UP (ref 0–0)
PHOSPHATE SERPL-MCNC: 1.8 MG/DL — LOW (ref 2.5–4.5)
PLATELET # BLD AUTO: 215 K/UL — SIGNIFICANT CHANGE UP (ref 150–400)
POTASSIUM SERPL-MCNC: 3.8 MMOL/L — SIGNIFICANT CHANGE UP (ref 3.5–5.3)
POTASSIUM SERPL-SCNC: 3.8 MMOL/L — SIGNIFICANT CHANGE UP (ref 3.5–5.3)
RBC # BLD: 3.7 M/UL — LOW (ref 3.8–5.2)
RBC # FLD: 15.4 % — HIGH (ref 10.3–14.5)
SODIUM SERPL-SCNC: 137 MMOL/L — SIGNIFICANT CHANGE UP (ref 135–145)
WBC # BLD: 7.01 K/UL — SIGNIFICANT CHANGE UP (ref 3.8–10.5)
WBC # FLD AUTO: 7.01 K/UL — SIGNIFICANT CHANGE UP (ref 3.8–10.5)

## 2020-03-27 PROCEDURE — 99358 PROLONG SERVICE W/O CONTACT: CPT

## 2020-03-27 PROCEDURE — 71045 X-RAY EXAM CHEST 1 VIEW: CPT | Mod: 26

## 2020-03-27 PROCEDURE — 99239 HOSP IP/OBS DSCHRG MGMT >30: CPT

## 2020-03-27 RX ORDER — METOPROLOL TARTRATE 50 MG
1 TABLET ORAL
Qty: 0 | Refills: 0 | DISCHARGE
Start: 2020-03-27

## 2020-03-27 RX ORDER — ASPIRIN/CALCIUM CARB/MAGNESIUM 324 MG
1 TABLET ORAL
Qty: 0 | Refills: 0 | DISCHARGE
Start: 2020-03-27

## 2020-03-27 RX ORDER — ATORVASTATIN CALCIUM 80 MG/1
1 TABLET, FILM COATED ORAL
Qty: 0 | Refills: 0 | DISCHARGE
Start: 2020-03-27

## 2020-03-27 RX ADMIN — Medication 12.5 MILLIGRAM(S): at 09:39

## 2020-03-27 RX ADMIN — ENOXAPARIN SODIUM 30 MILLIGRAM(S): 100 INJECTION SUBCUTANEOUS at 06:58

## 2020-03-27 RX ADMIN — Medication 81 MILLIGRAM(S): at 11:33

## 2020-03-27 NOTE — DISCHARGE NOTE PROVIDER - NSDCMRMEDTOKEN_GEN_ALL_CORE_FT
aspirin 81 mg oral tablet, chewable: 1 tab(s) orally once a day  atorvastatin 80 mg oral tablet: 1 tab(s) orally once a day (at bedtime)  metoprolol: 1 tab(s) orally 2 times a day

## 2020-03-27 NOTE — CHART NOTE - NSCHARTNOTEFT_GEN_A_CORE
Admitting Diagnosis:   Patient is a 79y old  Female who presents with a chief complaint of STEMI (27 Mar 2020 15:03).  Pt underwent cardiac cath on 3.18.  CT chest on 3.20 with no evidence of PE.  Pt with noted left ventricular ejection fraction of 21%.  Pt was rule out COVID-19; now negative with cultures returned.  Pt with limited cognitive impairment status; medical notes indicate possible encephalopathy.  Pt had NGT in place until 3/23 and pulled out.  Palliative care following; determining GOC.  SLP recommended pureed diet with thin liquids on 3/22.  Pt requires 100% supervision/feeding assistance & extra time to swallow.  Maintain aspiration precautions.  Limited subjective information obtained likely due to cognitive status.  Answers some questions but then trails off.  Observed Pt consuming lunch; positive appetite noted.  Calorie count ordered on 3/22 but no documents filled out.  Pt indicating she prefers applesauce to jello.  Needs RSA protocol.      PAST MEDICAL & SURGICAL HISTORY:  None noted    Current Nutrition Order:  Diet, Dysphagia 1 Pureed-Thin Liquids:   Supplement Feeding Modality:  Oral  Ensure High Protein Cans or Servings Per Day:  1       Frequency:  Three Times a day (03-23-20 @ 18:21)    PO Intake: Good (%) [   ]  Fair (25-50%) [ x  ] Poor (<25%) [   ]    GI Issues: Fecal incontinence noted; BMI documented in flowsheet for 3.27    Pain: Unable to assess    Skin Integrity: Pressure injury to left buttock noted on flowsheet    Labs: Albumin remains depleted; lytes within normal limits.  Adequate glycemic control.  03-27  137  |  102  |  13  ----------------------------<  94  3.8   |  29  |  0.37<L>  Ca    8.4      27 Mar 2020 06:36  Phos  1.8     03-27  Mg     2.0     03-27    TPro  5.4<L>  /  Alb  2.4<L>  /  TBili  0.6  /  DBili  x   /  AST  38  /  ALT  32  /  AlkPhos  60  03-26    CAPILLARY BLOOD GLUCOSE  POCT Blood Glucose.: 99 mg/dL (27 Mar 2020 13:10)  POCT Blood Glucose.: 93 mg/dL (27 Mar 2020 09:15)  POCT Blood Glucose.: 83 mg/dL (26 Mar 2020 17:31)      Medications:  MEDICATIONS  (STANDING):  ascorbic acid 500 milliGRAM(s) Oral every 24 hours  aspirin  chewable 81 milliGRAM(s) Oral daily  atorvastatin 80 milliGRAM(s) Oral at bedtime  dextrose 5%. 1000 milliLiter(s) (50 mL/Hr) IV Continuous <Continuous>  dextrose 50% Injectable 12.5 Gram(s) IV Push once  dextrose 50% Injectable 25 Gram(s) IV Push once  dextrose 50% Injectable 25 Gram(s) IV Push once  enoxaparin Injectable 30 milliGRAM(s) SubCutaneous every 24 hours  insulin lispro (HumaLOG) corrective regimen sliding scale   SubCutaneous Before meals and at bedtime  metoprolol tartrate 12.5 milliGRAM(s) Oral every 12 hours  multivitamin  Chewable 1 Tablet(s) Oral every 24 hours    MEDICATIONS  (PRN):  dextrose 40% Gel 15 Gram(s) Oral once PRN Blood Glucose LESS THAN 70 milliGRAM(s)/deciliter  glucagon  Injectable 1 milliGRAM(s) IntraMuscular once PRN Glucose LESS THAN 70 milligrams/deciliter      Anthropometrics:  Weight Hx: 41.8kg (3/27; suspect inaccurate given other Wt's); 35.3kg (3/24); 35kg (3/22); 35.5kg (3/19)  Ht: 150cm; BMI: 15.6 using Wt on 3/24; IBW: 44.5kg; %IBW: 79%    Nutrition Focused Physical Exam: Completed on 3/23; malnutrition chart note also placed on 3/23.  Agree with findings.    Estimated energy needs: IBW used for calculations as Pt Wt may be variable due to CHF but appears consistent with admission.  Current Wt < 80% IBW.  Nutrient needs based on [facility] standards of care for maintenance in adults, adjusted for CHF, pressure injury.  Fluids per team.  Calories: 7948-3117 kcals (25-30 kcals/kg)  Protein: 53-62gm pro (1.2-1.4 gm pro/kg)    Previous Nutrition Diagnosis: Severe protein calorie malnutrition in setting of suspected chronic illness r/t FTT AEB NFPE findings of moderate temporal muscle wasting, severe clavicle wasting, moderate orbital fat wasting, and severe buccal and triceps fat wasting.      Active [ x ]  Resolved [   ]    Goal: Prevent further signs/symptoms of malnutrition.  Po intake to meet within 75% of EER.    Recommendations:  1. Continue dysphagia pureed with thin liquids diet order.  Continue ensure high protein 3x/day.  Each ensure high protein provides 160 kcals and 16 gm protein.  2. Maintain strict aspiration precautions  3. Consider SLP follow-up to provide most appropriate food/solids and liquids textures  4. Incorporate food preferences into meal plan; add applesauce to dinner meals  5. Monitor BMP and replete as needed  6. Obtain weight q 3-5 days and monitor weekly    Education: Deferred at this time given Pt's cognitive status.    Risk Level: High [ x ] Moderate [   ] Low [   ]

## 2020-03-27 NOTE — DISCHARGE NOTE NURSING/CASE MANAGEMENT/SOCIAL WORK - PATIENT PORTAL LINK FT
You can access the FollowMyHealth Patient Portal offered by [facility] by registering at the following website: [email or web address]. By joining [facility]’s FollowMyHealth portal, you will also be able to view your health information using other applications (apps) compatible with our system.

## 2020-03-27 NOTE — PROGRESS NOTE ADULT - PROBLEM SELECTOR PLAN 10
- CT chest 3/18: Few calcifications are seen in the breasts bilaterally.  - Refer for outpatient mammogram    Dispo: [facility]  VTE ppx: Lovenox

## 2020-03-27 NOTE — DISCHARGE NOTE NURSING/CASE MANAGEMENT/SOCIAL WORK - NSDCFUADDAPPT_GEN_ALL_CORE_FT
Please follow up with staff at [facility] regarding your ongoing treatment and care, you also may be able to schedule follow up with an outpatient provider from [facility].

## 2020-03-27 NOTE — PROGRESS NOTE ADULT - SUBJECTIVE AND OBJECTIVE BOX
OVERNIGHT EVENTS: No acute events reported overnight    SUBJECTIVE / INTERVAL HPI: Patient seen and examined at bedside. Patient is unable to participate in review of systems due to underlying neurologic state, however reports she is in no acute distress.     VITAL SIGNS:  Vital Signs Last 24 Hrs  T(C): 36.7 (26 Mar 2020 21:10), Max: 37.2 (26 Mar 2020 09:00)  T(F): 98.1 (26 Mar 2020 21:10), Max: 98.9 (26 Mar 2020 09:00)  HR: 84 (26 Mar 2020 21:10) (63 - 87)  BP: 115/72 (26 Mar 2020 21:10) (103/59 - 126/63)  BP(mean): 76 (26 Mar 2020 09:00) (76 - 89)  RR: 18 (26 Mar 2020 21:10) (18 - 20)  SpO2: 96% (26 Mar 2020 21:10) (96% - 97%)    PHYSICAL EXAM:  General: Frail, cathectic female, no apparent distress   HEENT: NC/AT; PERRL, anicteric sclera; MMM  Neck: supple, no JVD  Cardiovascular: +S1/S2; RRR, no M/G/R appreciated  Respiratory: CTA B/L; no W/R/R  Gastrointestinal: soft, NT/ND; no masses, no pain or grimace to light or deep palpation  Extremities: WWP; no edema, clubbing or cyanosis  Vascular: 2+ radial, DP/PT pulses B/L  Neurological: AAOx0-1, verbal but unsure of location, time or name, opens eyes to name and tracks movement    MEDICATIONS:  MEDICATIONS  (STANDING):  ascorbic acid 500 milliGRAM(s) Oral every 24 hours  aspirin  chewable 81 milliGRAM(s) Oral daily  atorvastatin 80 milliGRAM(s) Oral at bedtime  dextrose 5%. 1000 milliLiter(s) (50 mL/Hr) IV Continuous <Continuous>  dextrose 50% Injectable 12.5 Gram(s) IV Push once  dextrose 50% Injectable 25 Gram(s) IV Push once  dextrose 50% Injectable 25 Gram(s) IV Push once  enoxaparin Injectable 30 milliGRAM(s) SubCutaneous every 24 hours  insulin lispro (HumaLOG) corrective regimen sliding scale   SubCutaneous Before meals and at bedtime  metoprolol tartrate 12.5 milliGRAM(s) Oral every 12 hours  multivitamin  Chewable 1 Tablet(s) Oral every 24 hours    MEDICATIONS  (PRN):  dextrose 40% Gel 15 Gram(s) Oral once PRN Blood Glucose LESS THAN 70 milliGRAM(s)/deciliter  glucagon  Injectable 1 milliGRAM(s) IntraMuscular once PRN Glucose LESS THAN 70 milligrams/deciliter      ALLERGIES:  Allergies    No Known Allergies    Intolerances        LABS:                        11.2   7.01  )-----------( 215      ( 27 Mar 2020 06:36 )             33.5     03-26    137  |  101  |  16  ----------------------------<  102<H>  4.5   |  28  |  0.44<L>    Ca    8.6      26 Mar 2020 07:16  Phos  2.1     03-26  Mg     1.8     03-26    TPro  5.4<L>  /  Alb  2.4<L>  /  TBili  0.6  /  DBili  x   /  AST  38  /  ALT  32  /  AlkPhos  60  03-26    PT/INR - ( 25 Mar 2020 19:18 )   PT: 11.3 sec;   INR: 0.99          PTT - ( 25 Mar 2020 19:18 )  PTT:31.4 sec    CAPILLARY BLOOD GLUCOSE      POCT Blood Glucose.: 83 mg/dL (26 Mar 2020 17:31)      RADIOLOGY & ADDITIONAL TESTS: Reviewed.

## 2020-03-27 NOTE — DISCHARGE NOTE PROVIDER - NSDCFUADDAPPT_GEN_ALL_CORE_FT
Please follow up with staff at [facility] regarding your ongoing treatment and care, you also may be able to schedule follow up with an outpatient provider from [facility].

## 2020-03-27 NOTE — PROGRESS NOTE ADULT - PROBLEM SELECTOR PLAN 8
Cachectic, underweight, anorexic BMI=15.8, multiple pressure ulcers on body, RD/ SLP/SW following  - Most likely has not been taken care of, no recorded home address or family contacts.   - Mgmt as described above

## 2020-03-27 NOTE — DISCHARGE NOTE NURSING/CASE MANAGEMENT/SOCIAL WORK - NSDCPEWEB_GEN_ALL_CORE
[facility] for Tobacco Control website --- [email or web address] --- [email or web address]

## 2020-03-27 NOTE — PROGRESS NOTE ADULT - PROBLEM SELECTOR PLAN 7
Unclear as to why pt can't initiate swallowing reflex, most likely 2/2 encephalopathy  - SLP evaluated pt on 3/22 and diet advanced to pureed diet w/ thin liquids with supplemental TFs; pt. tolerating oral feeds    - Requires 100% supervision/feeding assistance & extra time to swallow. Cue to swallow as needed. Ensure oral cavity is clear prior to administering next bite or sip, upright position during and 30 min. follow meals. Misc orders placed.

## 2020-03-27 NOTE — PROGRESS NOTE ADULT - PROBLEM SELECTOR PLAN 3
R/T LLL collapse and likely post-obstructive PNA, HFNC transitioned to 4L humidified NC on 3/22 AM which has been titrated off to RA since 3/24/20   - RVP negative  - CTPE 3/20: r/o PE, but did show LLL collapse with mucus plugging and likely post-obstructive PNA. Cannot r/o endobronchial mass on CT PE, but less likely, poor candidate for invasive w/u  - CXR 3/22: without LLL collapse, small left effusion, resolution L lung infiltrates as described  - Pulm consulted, appreciate recs including chest PT, mucomyst, aspiration precautions, frequent suctioning, aggressive pulmonary hygiene  - Ceftriaxone 1g IV QD (initiated 3/21) to complete 5 day course (day 5/5). Will restart abx if wbc increases as pt with significant pna on CT.

## 2020-03-27 NOTE — CHART NOTE - NSCHARTNOTEFT_GEN_A_CORE
PALLIATIVE MEDICINE COORDINATION OF CARE NOTE FOR LORA PINEDA      Patient last seen on: __3/26____ in order to manage: _GOC_____ and was recommended: ___Hopsice____      ______ Minutes; Start: _____  End: ____, of non-face-to-face prolonged service provided that relates to (face-to-face) care that has or will occur and ongoing patient management, including one or more of the following:     - Reviewed records from other physicians or other health care professional services, including one or more of the following: other medical records and diagnostic / radiology study results     HPI:  HPI: 78 yo F unknown PMH brought in by EMS w/ STEMI. Pt taken straight to cath lab on arrival. Pt was not able to provide hx, and no other hx provided. Pt was put on non-rebreather mask by EMS and taken straight to cath lab. There were no contacts listed or documented home address. Home address was written as hospital address by EMS. No home or family numbers listed.  Vitals: 96.4 F, , 162/96--->90/54, RR 15-20, spo2=97-98 on non-rebreather (15L/min)  Labs drawn during Cath: WBC 14.7, lymphocytes 0.78 (low), Hgb 15.9, INR=1.47, Na 149, K=3.1, HCO3-18, AG=24, BUN=62, Ca 10.7, AST=65, lactate dehydrogenase 383, troponin 1.10, creatine kinase 193, ABG PH=7.39 PO2=93 PCO2=34   Imaging: CXR neg for infiltrate, however hyperinflated (18 Mar 2020 19:15)      - Other: iStop reviewed.    NO Rx found on iStop review. Ref #:     - Other: Medication reviewed.    The patient HAS NOT used PRN's in the last 24h.    MEDICATIONS  (STANDING):  ascorbic acid 500 milliGRAM(s) Oral every 24 hours  aspirin  chewable 81 milliGRAM(s) Oral daily  atorvastatin 80 milliGRAM(s) Oral at bedtime  dextrose 5%. 1000 milliLiter(s) (50 mL/Hr) IV Continuous <Continuous>  dextrose 50% Injectable 12.5 Gram(s) IV Push once  dextrose 50% Injectable 25 Gram(s) IV Push once  dextrose 50% Injectable 25 Gram(s) IV Push once  enoxaparin Injectable 30 milliGRAM(s) SubCutaneous every 24 hours  insulin lispro (HumaLOG) corrective regimen sliding scale   SubCutaneous Before meals and at bedtime  metoprolol tartrate 12.5 milliGRAM(s) Oral every 12 hours  multivitamin  Chewable 1 Tablet(s) Oral every 24 hours    MEDICATIONS  (PRN):  dextrose 40% Gel 15 Gram(s) Oral once PRN Blood Glucose LESS THAN 70 milliGRAM(s)/deciliter  glucagon  Injectable 1 milliGRAM(s) IntraMuscular once PRN Glucose LESS THAN 70 milligrams/deciliter      - Other: Advanced directives       - Other: Coordination/Plan of care     Discussed with primary team and _____ PALLIATIVE MEDICINE COORDINATION OF CARE NOTE FOR LORA PINEDA      Patient last seen on: __3/26____ in order to manage: _GOC_____ and was recommended: ___Hospice____      ___30___ Minutes; Start: _1030____  End: __11am__, of non-face-to-face prolonged service provided that relates to (face-to-face) care that has or will occur and ongoing patient management, including one or more of the following:     - Reviewed records from other physicians or other health care professional services, including one or more of the following: other medical records and diagnostic / radiology study results     HPI:  HPI: 78 yo F unknown PMH brought in by EMS w/ STEMI. Pt taken straight to cath lab on arrival. Pt was not able to provide hx, and no other hx provided. Pt was put on non-rebreather mask by EMS and taken straight to cath lab. There were no contacts listed or documented home address. Home address was written as hospital address by EMS. No home or family numbers listed.  Vitals: 96.4 F, , 162/96--->90/54, RR 15-20, spo2=97-98 on non-rebreather (15L/min)  Labs drawn during Cath: WBC 14.7, lymphocytes 0.78 (low), Hgb 15.9, INR=1.47, Na 149, K=3.1, HCO3-18, AG=24, BUN=62, Ca 10.7, AST=65, lactate dehydrogenase 383, troponin 1.10, creatine kinase 193, ABG PH=7.39 PO2=93 PCO2=34   Imaging: CXR neg for infiltrate, however hyperinflated (18 Mar 2020 19:15)      - Other: Medication reviewed.    The patient HAS NOT used PRN's in the last 24h.    MEDICATIONS  (STANDING):  ascorbic acid 500 milliGRAM(s) Oral every 24 hours  aspirin  chewable 81 milliGRAM(s) Oral daily  atorvastatin 80 milliGRAM(s) Oral at bedtime  dextrose 5%. 1000 milliLiter(s) (50 mL/Hr) IV Continuous <Continuous>  dextrose 50% Injectable 12.5 Gram(s) IV Push once  dextrose 50% Injectable 25 Gram(s) IV Push once  dextrose 50% Injectable 25 Gram(s) IV Push once  enoxaparin Injectable 30 milliGRAM(s) SubCutaneous every 24 hours  insulin lispro (HumaLOG) corrective regimen sliding scale   SubCutaneous Before meals and at bedtime  metoprolol tartrate 12.5 milliGRAM(s) Oral every 12 hours  multivitamin  Chewable 1 Tablet(s) Oral every 24 hours    MEDICATIONS  (PRN):  dextrose 40% Gel 15 Gram(s) Oral once PRN Blood Glucose LESS THAN 70 milliGRAM(s)/deciliter  glucagon  Injectable 1 milliGRAM(s) IntraMuscular once PRN Glucose LESS THAN 70 milligrams/deciliter      - Other: Advanced directives  Patient remains full code. Has no family who are willing to make any decisions on her behalf.       - Other: Coordination/Plan of care     Discussed with primary team    Patient has a qualifying diagnosis of Heart failure with an EF of 21%. She has no one willing to make any decisions for her regarding her goals of care and is unable to make them at this time. She will eventually need guardianship if her mental status does not return. Patient to go to [facility] at this time. Palliative care will sign off at this time. Please reconsult as needed.

## 2020-03-27 NOTE — DISCHARGE NOTE PROVIDER - HOSPITAL COURSE
#Discharge: do not delete        79F with an unknown past medical history    Presented after being found down for an unknown amount of time, and was found to have had a STEMI and was cathed.    Problem List/Main Diagnoses (system-based):     Inpatient treatment course:     1. STEMI - Patient admitted with STEMI, cathed on 3/18 with pLAD 70% occlusion, LVEF of 15-20%. 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 No intervention taken.        PNA - Patient develped post obstructive PNA, complicated by desaturation due to mucus plugging. Patient initially required HFNC, but was then transitioned to regular NC then to room air without further complication.        Palliative encounter - Patient without known living will. No known HCP. Daughter in law contacted and was unwilling or unable to make medical decisions. Decision made to send patient to [facility] for ongiong evaluation and treatment. Patient does not have decision making capacity, and is AAOx0 but follows commands during and prior to hospital stay.         New medications: ASA 81 mg, metoprolol tartrate 12.5 BID, atorvastatin 80 mg PO Qd    Labs to be followed outpatient: None    Exam to be followed outpatient: None #Discharge: do not delete        79F with an unknown past medical history    Presented after being found down for an unknown amount of time, and was found to have had a STEMI and was cathed.    Problem List/Main Diagnoses (system-based):     Inpatient treatment course:     1. r/o STEMI - Patient admitted with STEMI, cathed on 3/18 with pLAD 70% occlusion, LVEF of 15-20%. 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 No intervention taken.        2. HFrEF - LVEF found to be 15-20% on cardiac cath. No previous known history of heart failure with reduced ejection fraction. No s/s of volume overload. Patient started on asa, metoprolol and atorvastatin. To follow up with cardiology as an outpatient.         3. PNA - Patient develped post obstructive PNA, complicated by desaturation due to mucus plugging. Patient initially required HFNC, but was then transitioned to regular NC then to room air without further complication.        4. Palliative encounter - Patient without known living will. No known HCP. Daughter in law contacted and was unwilling or unable to make medical decisions. Decision made to send patient to [facility] ([place] for ongiong evaluation and treatment. Patient does not have decision making capacity, and is AAOx0 but follows commands during and prior to hospital stay. Patient also severely protein calorie malnourished         New medications: ASA 81 mg, metoprolol tartrate 12.5 BID, atorvastatin 80 mg PO Qd    Labs to be followed outpatient: None    Exam to be followed outpatient: None

## 2020-03-27 NOTE — DISCHARGE NOTE PROVIDER - NSDCCPCAREPLAN_GEN_ALL_CORE_FT
PRINCIPAL DISCHARGE DIAGNOSIS  Diagnosis: ST elevation MI (STEMI)  Assessment and Plan of Treatment: Admitted with ST elevations, found to have nonobstructive disease, no stents placed

## 2020-03-27 NOTE — PROGRESS NOTE ADULT - PROBLEM SELECTOR PLAN 1
Euvolemic on exam, most likely 2/2 AG metabolic acidosis 2/2 starvation ketosis vs stress/emotional vs infection.  EKG 3/22: 1 mm ST-elevation in V3-V4 and TWI in inferolateral leads 2/2 Takusubo cardiomyopathy. Diagnostic LHC/RHC on 03/18/2020 with Dr. JACOBY Castanon revealed pLAD 70%, mLad 60-70%, pD1 80-90%. pLCX 30-40%, OM1/RCA with mild LI,  EF 15-20%, EDP 17, severe apical ballooning, akinesis suggestive of Takutsobo CM; RHC PA 21/10 PCWP 14. PA sat 67%. AoSat 96%. CO 2.9. CI 2.16, R groin & R radial.  Echo 3/20/2020: MV moderately thickened, systolic anterior motion of the MV resulting in an LVOT gradient of 62.00 mmHg and severe LVOT obstruction, moderate MR, hypertrophy of the basal septum, LVEF 21%, normal basal wall motion, rest of the segments are severely hypokinetic to akinetic.   -c/w aspirin 81 mg daily, Lipitor 80mg daily  - c/w Lopressor 12.5 mg PO BID as beneficial for Takutsobo w/ increased pre-load capability  - Hold off on ACEi/ARB for now 2/2 SBP 90s; will initiate once BP stable

## 2020-03-27 NOTE — PROGRESS NOTE ADULT - PROBLEM SELECTOR PROBLEM 1
Takotsubo cardiomyopathy
Cardiomyopathy
Takotsubo cardiomyopathy
Takotsubo cardiomyopathy
Cardiomyopathy
Takotsubo cardiomyopathy

## 2020-03-27 NOTE — PROGRESS NOTE ADULT - ASSESSMENT
80 yo underweight female with FTT and unknown PMHx brought in by EMS after found down on floor for unknown period of time, found to have STEMI (in all leads) and STEMI code was activated s/p diagnostic cardiac cath 03/18/2020 with Dr Castanon revealing pLAD 70%, pD1 80-90%, no current plan for intervention, cath also revealing LVEF 15-20% with severe apical ballooning & akinesis suggestive of Takutsobo Cardiomyopathy, pt with hypoxemic respiratory failure since admission Palliative team consulted for further dispo planning (possible hospice). Pt with high suspicion for COVID as pt. with lympophenia; elevated AST, cxr with infiltrates.  Pt. is was transferred to COVID unit for further management now COVID neg x2. Transferred to [facility] for further management and possible placement at [facility] vs. hospice (qualifies due to low EF).

## 2020-03-27 NOTE — DISCHARGE NOTE NURSING/CASE MANAGEMENT/SOCIAL WORK - NSDCPEEMAIL_GEN_ALL_CORE
[facility] email [email or web address]

## 2020-03-28 LAB — GLUCOSE BLDC GLUCOMTR-MCNC: 134 MG/DL — HIGH (ref 70–99)

## 2020-03-31 DIAGNOSIS — R13.10 DYSPHAGIA, UNSPECIFIED: ICD-10-CM

## 2020-03-31 DIAGNOSIS — D69.6 THROMBOCYTOPENIA, UNSPECIFIED: ICD-10-CM

## 2020-03-31 DIAGNOSIS — G93.41 METABOLIC ENCEPHALOPATHY: ICD-10-CM

## 2020-03-31 DIAGNOSIS — I21.3 ST ELEVATION (STEMI) MYOCARDIAL INFARCTION OF UNSPECIFIED SITE: ICD-10-CM

## 2020-03-31 DIAGNOSIS — E87.3 ALKALOSIS: ICD-10-CM

## 2020-03-31 DIAGNOSIS — I45.81 LONG QT SYNDROME: ICD-10-CM

## 2020-03-31 DIAGNOSIS — E43 UNSPECIFIED SEVERE PROTEIN-CALORIE MALNUTRITION: ICD-10-CM

## 2020-03-31 DIAGNOSIS — R65.10 SYSTEMIC INFLAMMATORY RESPONSE SYNDROME (SIRS) OF NON-INFECTIOUS ORIGIN WITHOUT ACUTE ORGAN DYSFUNCTION: ICD-10-CM

## 2020-03-31 DIAGNOSIS — E27.8 OTHER SPECIFIED DISORDERS OF ADRENAL GLAND: ICD-10-CM

## 2020-03-31 DIAGNOSIS — E87.2 ACIDOSIS: ICD-10-CM

## 2020-03-31 DIAGNOSIS — J98.11 ATELECTASIS: ICD-10-CM

## 2020-03-31 DIAGNOSIS — Z51.5 ENCOUNTER FOR PALLIATIVE CARE: ICD-10-CM

## 2020-03-31 DIAGNOSIS — R92.1 MAMMOGRAPHIC CALCIFICATION FOUND ON DIAGNOSTIC IMAGING OF BREAST: ICD-10-CM

## 2020-03-31 DIAGNOSIS — E86.0 DEHYDRATION: ICD-10-CM

## 2020-03-31 DIAGNOSIS — E88.89 OTHER SPECIFIED METABOLIC DISORDERS: ICD-10-CM

## 2020-03-31 DIAGNOSIS — J96.01 ACUTE RESPIRATORY FAILURE WITH HYPOXIA: ICD-10-CM

## 2020-03-31 DIAGNOSIS — I51.81 TAKOTSUBO SYNDROME: ICD-10-CM

## 2020-03-31 DIAGNOSIS — E04.9 NONTOXIC GOITER, UNSPECIFIED: ICD-10-CM

## 2020-03-31 DIAGNOSIS — J18.8 OTHER PNEUMONIA, UNSPECIFIED ORGANISM: ICD-10-CM

## 2020-03-31 DIAGNOSIS — I50.21 ACUTE SYSTOLIC (CONGESTIVE) HEART FAILURE: ICD-10-CM

## 2020-03-31 DIAGNOSIS — R73.9 HYPERGLYCEMIA, UNSPECIFIED: ICD-10-CM

## 2020-03-31 DIAGNOSIS — R62.7 ADULT FAILURE TO THRIVE: ICD-10-CM

## 2020-03-31 DIAGNOSIS — E87.0 HYPEROSMOLALITY AND HYPERNATREMIA: ICD-10-CM

## 2020-03-31 DIAGNOSIS — I25.10 ATHEROSCLEROTIC HEART DISEASE OF NATIVE CORONARY ARTERY WITHOUT ANGINA PECTORIS: ICD-10-CM

## 2020-03-31 DIAGNOSIS — I34.0 NONRHEUMATIC MITRAL (VALVE) INSUFFICIENCY: ICD-10-CM

## 2020-04-30 PROCEDURE — C1769: CPT

## 2020-04-30 PROCEDURE — 83615 LACTATE (LD) (LDH) ENZYME: CPT

## 2020-04-30 PROCEDURE — 82962 GLUCOSE BLOOD TEST: CPT

## 2020-04-30 PROCEDURE — 84480 ASSAY TRIIODOTHYRONINE (T3): CPT

## 2020-04-30 PROCEDURE — 82728 ASSAY OF FERRITIN: CPT

## 2020-04-30 PROCEDURE — 94640 AIRWAY INHALATION TREATMENT: CPT

## 2020-04-30 PROCEDURE — 82533 TOTAL CORTISOL: CPT

## 2020-04-30 PROCEDURE — 87635 SARS-COV-2 COVID-19 AMP PRB: CPT

## 2020-04-30 PROCEDURE — 70450 CT HEAD/BRAIN W/O DYE: CPT

## 2020-04-30 PROCEDURE — 83605 ASSAY OF LACTIC ACID: CPT

## 2020-04-30 PROCEDURE — 97530 THERAPEUTIC ACTIVITIES: CPT

## 2020-04-30 PROCEDURE — 80307 DRUG TEST PRSMV CHEM ANLYZR: CPT

## 2020-04-30 PROCEDURE — 36415 COLL VENOUS BLD VENIPUNCTURE: CPT

## 2020-04-30 PROCEDURE — 76536 US EXAM OF HEAD AND NECK: CPT

## 2020-04-30 PROCEDURE — 84436 ASSAY OF TOTAL THYROXINE: CPT

## 2020-04-30 PROCEDURE — 93005 ELECTROCARDIOGRAM TRACING: CPT

## 2020-04-30 PROCEDURE — C1889: CPT

## 2020-04-30 PROCEDURE — 86140 C-REACTIVE PROTEIN: CPT

## 2020-04-30 PROCEDURE — 84100 ASSAY OF PHOSPHORUS: CPT

## 2020-04-30 PROCEDURE — 82607 VITAMIN B-12: CPT

## 2020-04-30 PROCEDURE — 71275 CT ANGIOGRAPHY CHEST: CPT

## 2020-04-30 PROCEDURE — 83880 ASSAY OF NATRIURETIC PEPTIDE: CPT

## 2020-04-30 PROCEDURE — 85652 RBC SED RATE AUTOMATED: CPT

## 2020-04-30 PROCEDURE — 93970 EXTREMITY STUDY: CPT

## 2020-04-30 PROCEDURE — 71250 CT THORAX DX C-: CPT

## 2020-04-30 PROCEDURE — 83935 ASSAY OF URINE OSMOLALITY: CPT

## 2020-04-30 PROCEDURE — 87798 DETECT AGENT NOS DNA AMP: CPT

## 2020-04-30 PROCEDURE — 82553 CREATINE MB FRACTION: CPT

## 2020-04-30 PROCEDURE — 92610 EVALUATE SWALLOWING FUNCTION: CPT

## 2020-04-30 PROCEDURE — 82746 ASSAY OF FOLIC ACID SERUM: CPT

## 2020-04-30 PROCEDURE — 82010 KETONE BODYS QUAN: CPT

## 2020-04-30 PROCEDURE — 92526 ORAL FUNCTION THERAPY: CPT

## 2020-04-30 PROCEDURE — 80053 COMPREHEN METABOLIC PANEL: CPT

## 2020-04-30 PROCEDURE — 84484 ASSAY OF TROPONIN QUANT: CPT

## 2020-04-30 PROCEDURE — 81001 URINALYSIS AUTO W/SCOPE: CPT

## 2020-04-30 PROCEDURE — 82803 BLOOD GASES ANY COMBINATION: CPT

## 2020-04-30 PROCEDURE — 84300 ASSAY OF URINE SODIUM: CPT

## 2020-04-30 PROCEDURE — 82140 ASSAY OF AMMONIA: CPT

## 2020-04-30 PROCEDURE — C1887: CPT

## 2020-04-30 PROCEDURE — 80048 BASIC METABOLIC PNL TOTAL CA: CPT

## 2020-04-30 PROCEDURE — 85730 THROMBOPLASTIN TIME PARTIAL: CPT

## 2020-04-30 PROCEDURE — 87633 RESP VIRUS 12-25 TARGETS: CPT

## 2020-04-30 PROCEDURE — 80061 LIPID PANEL: CPT

## 2020-04-30 PROCEDURE — 83930 ASSAY OF BLOOD OSMOLALITY: CPT

## 2020-04-30 PROCEDURE — 85379 FIBRIN DEGRADATION QUANT: CPT

## 2020-04-30 PROCEDURE — C1894: CPT

## 2020-04-30 PROCEDURE — 87581 M.PNEUMON DNA AMP PROBE: CPT

## 2020-04-30 PROCEDURE — C8929: CPT

## 2020-04-30 PROCEDURE — 97116 GAIT TRAINING THERAPY: CPT

## 2020-04-30 PROCEDURE — 84443 ASSAY THYROID STIM HORMONE: CPT

## 2020-04-30 PROCEDURE — 83036 HEMOGLOBIN GLYCOSYLATED A1C: CPT

## 2020-04-30 PROCEDURE — 84439 ASSAY OF FREE THYROXINE: CPT

## 2020-04-30 PROCEDURE — 82550 ASSAY OF CK (CPK): CPT

## 2020-04-30 PROCEDURE — 87486 CHLMYD PNEUM DNA AMP PROBE: CPT

## 2020-04-30 PROCEDURE — 71045 X-RAY EXAM CHEST 1 VIEW: CPT

## 2020-04-30 PROCEDURE — 85027 COMPLETE CBC AUTOMATED: CPT

## 2020-04-30 PROCEDURE — 86901 BLOOD TYPING SEROLOGIC RH(D): CPT

## 2020-04-30 PROCEDURE — 87449 NOS EACH ORGANISM AG IA: CPT

## 2020-04-30 PROCEDURE — 85025 COMPLETE CBC W/AUTO DIFF WBC: CPT

## 2020-04-30 PROCEDURE — 85610 PROTHROMBIN TIME: CPT

## 2020-04-30 PROCEDURE — 86850 RBC ANTIBODY SCREEN: CPT

## 2020-04-30 PROCEDURE — 83735 ASSAY OF MAGNESIUM: CPT

## 2020-04-30 PROCEDURE — 87040 BLOOD CULTURE FOR BACTERIA: CPT

## 2022-01-26 ENCOUNTER — INPATIENT (INPATIENT)
Facility: HOSPITAL | Age: 82
LOS: 6 days | Discharge: NURSING, REHAB OR LONG-TERM CARE | DRG: 64 | End: 2022-02-02
Attending: PSYCHIATRY & NEUROLOGY | Admitting: PSYCHIATRY & NEUROLOGY
Payer: MEDICARE

## 2022-01-26 VITALS
TEMPERATURE: 98 F | OXYGEN SATURATION: 99 % | HEIGHT: 59.06 IN | HEART RATE: 64 BPM | WEIGHT: 104.94 LBS | RESPIRATION RATE: 16 BRPM | SYSTOLIC BLOOD PRESSURE: 189 MMHG | DIASTOLIC BLOOD PRESSURE: 79 MMHG

## 2022-01-26 LAB
ALBUMIN SERPL ELPH-MCNC: 2.9 G/DL — LOW (ref 3.3–5)
ALP SERPL-CCNC: 65 U/L — SIGNIFICANT CHANGE UP (ref 40–120)
ALT FLD-CCNC: 13 U/L — SIGNIFICANT CHANGE UP (ref 10–45)
ANION GAP SERPL CALC-SCNC: 8 MMOL/L — SIGNIFICANT CHANGE UP (ref 5–17)
APPEARANCE UR: ABNORMAL
APTT BLD: 29.9 SEC — SIGNIFICANT CHANGE UP (ref 27.5–35.5)
AST SERPL-CCNC: 47 U/L — HIGH (ref 10–40)
BASOPHILS # BLD AUTO: 0.04 K/UL — SIGNIFICANT CHANGE UP (ref 0–0.2)
BASOPHILS NFR BLD AUTO: 0.5 % — SIGNIFICANT CHANGE UP (ref 0–2)
BILIRUB SERPL-MCNC: 0.4 MG/DL — SIGNIFICANT CHANGE UP (ref 0.2–1.2)
BILIRUB UR-MCNC: NEGATIVE — SIGNIFICANT CHANGE UP
BUN SERPL-MCNC: 17 MG/DL — SIGNIFICANT CHANGE UP (ref 7–23)
CALCIUM SERPL-MCNC: 8.3 MG/DL — LOW (ref 8.4–10.5)
CHLORIDE SERPL-SCNC: 107 MMOL/L — SIGNIFICANT CHANGE UP (ref 96–108)
CO2 SERPL-SCNC: 28 MMOL/L — SIGNIFICANT CHANGE UP (ref 22–31)
COLOR SPEC: YELLOW — SIGNIFICANT CHANGE UP
CREAT SERPL-MCNC: 0.68 MG/DL — SIGNIFICANT CHANGE UP (ref 0.5–1.3)
DIFF PNL FLD: NEGATIVE — SIGNIFICANT CHANGE UP
EOSINOPHIL # BLD AUTO: 0.04 K/UL — SIGNIFICANT CHANGE UP (ref 0–0.5)
EOSINOPHIL NFR BLD AUTO: 0.5 % — SIGNIFICANT CHANGE UP (ref 0–6)
GLUCOSE SERPL-MCNC: 127 MG/DL — HIGH (ref 70–99)
GLUCOSE UR QL: NEGATIVE — SIGNIFICANT CHANGE UP
HCT VFR BLD CALC: 37 % — SIGNIFICANT CHANGE UP (ref 34.5–45)
HGB BLD-MCNC: 11.8 G/DL — SIGNIFICANT CHANGE UP (ref 11.5–15.5)
IMM GRANULOCYTES NFR BLD AUTO: 0.7 % — SIGNIFICANT CHANGE UP (ref 0–1.5)
INR BLD: 1.11 — SIGNIFICANT CHANGE UP (ref 0.88–1.16)
KETONES UR-MCNC: NEGATIVE — SIGNIFICANT CHANGE UP
LACTATE SERPL-SCNC: 1.2 MMOL/L — SIGNIFICANT CHANGE UP (ref 0.5–2)
LEUKOCYTE ESTERASE UR-ACNC: NEGATIVE — SIGNIFICANT CHANGE UP
LYMPHOCYTES # BLD AUTO: 1.45 K/UL — SIGNIFICANT CHANGE UP (ref 1–3.3)
LYMPHOCYTES # BLD AUTO: 17.2 % — SIGNIFICANT CHANGE UP (ref 13–44)
MCHC RBC-ENTMCNC: 28.9 PG — SIGNIFICANT CHANGE UP (ref 27–34)
MCHC RBC-ENTMCNC: 31.9 GM/DL — LOW (ref 32–36)
MCV RBC AUTO: 90.5 FL — SIGNIFICANT CHANGE UP (ref 80–100)
MONOCYTES # BLD AUTO: 0.78 K/UL — SIGNIFICANT CHANGE UP (ref 0–0.9)
MONOCYTES NFR BLD AUTO: 9.3 % — SIGNIFICANT CHANGE UP (ref 2–14)
NEUTROPHILS # BLD AUTO: 6.06 K/UL — SIGNIFICANT CHANGE UP (ref 1.8–7.4)
NEUTROPHILS NFR BLD AUTO: 71.8 % — SIGNIFICANT CHANGE UP (ref 43–77)
NITRITE UR-MCNC: NEGATIVE — SIGNIFICANT CHANGE UP
NRBC # BLD: 0 /100 WBCS — SIGNIFICANT CHANGE UP (ref 0–0)
PH UR: 6.5 — SIGNIFICANT CHANGE UP (ref 5–8)
PLATELET # BLD AUTO: 288 K/UL — SIGNIFICANT CHANGE UP (ref 150–400)
POTASSIUM SERPL-MCNC: 2.9 MMOL/L — CRITICAL LOW (ref 3.5–5.3)
POTASSIUM SERPL-SCNC: 2.9 MMOL/L — CRITICAL LOW (ref 3.5–5.3)
PROT SERPL-MCNC: 6.2 G/DL — SIGNIFICANT CHANGE UP (ref 6–8.3)
PROT UR-MCNC: NEGATIVE MG/DL — SIGNIFICANT CHANGE UP
PROTHROM AB SERPL-ACNC: 13.3 SEC — SIGNIFICANT CHANGE UP (ref 10.6–13.6)
RBC # BLD: 4.09 M/UL — SIGNIFICANT CHANGE UP (ref 3.8–5.2)
RBC # FLD: 12.6 % — SIGNIFICANT CHANGE UP (ref 10.3–14.5)
SARS-COV-2 RNA SPEC QL NAA+PROBE: SIGNIFICANT CHANGE UP
SODIUM SERPL-SCNC: 143 MMOL/L — SIGNIFICANT CHANGE UP (ref 135–145)
SP GR SPEC: 1.02 — SIGNIFICANT CHANGE UP (ref 1–1.03)
UROBILINOGEN FLD QL: 0.2 E.U./DL — SIGNIFICANT CHANGE UP
WBC # BLD: 8.43 K/UL — SIGNIFICANT CHANGE UP (ref 3.8–10.5)
WBC # FLD AUTO: 8.43 K/UL — SIGNIFICANT CHANGE UP (ref 3.8–10.5)

## 2022-01-26 PROCEDURE — 71045 X-RAY EXAM CHEST 1 VIEW: CPT | Mod: 26

## 2022-01-26 PROCEDURE — 70496 CT ANGIOGRAPHY HEAD: CPT | Mod: 26

## 2022-01-26 PROCEDURE — 99285 EMERGENCY DEPT VISIT HI MDM: CPT

## 2022-01-26 PROCEDURE — 70450 CT HEAD/BRAIN W/O DYE: CPT | Mod: 26,MA,59

## 2022-01-26 PROCEDURE — 70551 MRI BRAIN STEM W/O DYE: CPT | Mod: 26,MA

## 2022-01-26 PROCEDURE — 70498 CT ANGIOGRAPHY NECK: CPT | Mod: 26

## 2022-01-26 RX ORDER — POTASSIUM CHLORIDE 20 MEQ
10 PACKET (EA) ORAL ONCE
Refills: 0 | Status: COMPLETED | OUTPATIENT
Start: 2022-01-26 | End: 2022-01-26

## 2022-01-26 RX ORDER — SODIUM CHLORIDE 9 MG/ML
1000 INJECTION INTRAMUSCULAR; INTRAVENOUS; SUBCUTANEOUS ONCE
Refills: 0 | Status: COMPLETED | OUTPATIENT
Start: 2022-01-26 | End: 2022-01-26

## 2022-01-26 RX ORDER — POTASSIUM CHLORIDE 20 MEQ
40 PACKET (EA) ORAL ONCE
Refills: 0 | Status: COMPLETED | OUTPATIENT
Start: 2022-01-26 | End: 2022-01-26

## 2022-01-26 RX ADMIN — Medication 100 MILLIEQUIVALENT(S): at 18:50

## 2022-01-26 RX ADMIN — SODIUM CHLORIDE 1000 MILLILITER(S): 9 INJECTION INTRAMUSCULAR; INTRAVENOUS; SUBCUTANEOUS at 17:57

## 2022-01-26 NOTE — ED ADULT NURSE NOTE - OBJECTIVE STATEMENT
Pt BIBA from [facility] w c/o altered mental status for 10 days. As per EMS COVID + for past 10 days. Pt nonverbal, Pt received awake . Vitals remain stable as documented. No s/s of distress of discomfort. Pt assisted to T&P Q2. Incontinence care provided PRN, placed on primafit. Rt arm single lumen line noted with d/c dressing.

## 2022-01-26 NOTE — ED ADULT NURSE NOTE - NSIMPLEMENTINTERV_GEN_ALL_ED
Implemented All Fall with Harm Risk Interventions:  [place] to call system. Call bell, personal items and telephone within reach. Instruct patient to call for assistance. Room bathroom lighting operational. Non-slip footwear when patient is off stretcher. Physically safe environment: no spills, clutter or unnecessary equipment. Stretcher in lowest position, wheels locked, appropriate side rails in place. Provide visual cue, wrist band, yellow gown, etc. Monitor gait and stability. Monitor for mental status changes and reorient to person, place, and time. Review medications for side effects contributing to fall risk. Reinforce activity limits and safety measures with patient and family. Provide visual clues: red socks.

## 2022-01-26 NOTE — ED PROVIDER NOTE - OBJECTIVE STATEMENT
81 F pmh dementia- ao x 2 at baseline- had covid 12-13 d ago- complc w [facility] pna- completed ceftriaxone- poor po intake since dx- and still not eating/speaking- new thyroid nodule noted- sent in for head CT- electrolyte replenishment 81 F pmh dementia- ao x 2 at baseline- had covid 12-13 d ago- complc w [facility] pna- completed ceftriaxone- poor po intake since dx- and still not eating/speaking- new thyroid nodule noted- sent in for head CT- electrolyte replenishment  mod severity  no sig exac/allev factors

## 2022-01-27 DIAGNOSIS — R73.03 PREDIABETES: ICD-10-CM

## 2022-01-27 DIAGNOSIS — F03.90 UNSPECIFIED DEMENTIA WITHOUT BEHAVIORAL DISTURBANCE: ICD-10-CM

## 2022-01-27 DIAGNOSIS — I63.9 CEREBRAL INFARCTION, UNSPECIFIED: ICD-10-CM

## 2022-01-27 DIAGNOSIS — E01.0 IODINE-DEFICIENCY RELATED DIFFUSE (ENDEMIC) GOITER: ICD-10-CM

## 2022-01-27 DIAGNOSIS — R00.1 BRADYCARDIA, UNSPECIFIED: ICD-10-CM

## 2022-01-27 DIAGNOSIS — E87.6 HYPOKALEMIA: ICD-10-CM

## 2022-01-27 DIAGNOSIS — J90 PLEURAL EFFUSION, NOT ELSEWHERE CLASSIFIED: ICD-10-CM

## 2022-01-27 LAB
A1C WITH ESTIMATED AVERAGE GLUCOSE RESULT: 5.7 % — HIGH (ref 4–5.6)
ANION GAP SERPL CALC-SCNC: 10 MMOL/L — SIGNIFICANT CHANGE UP (ref 5–17)
ANION GAP SERPL CALC-SCNC: 8 MMOL/L — SIGNIFICANT CHANGE UP (ref 5–17)
ANION GAP SERPL CALC-SCNC: 9 MMOL/L — SIGNIFICANT CHANGE UP (ref 5–17)
BUN SERPL-MCNC: 12 MG/DL — SIGNIFICANT CHANGE UP (ref 7–23)
BUN SERPL-MCNC: 12 MG/DL — SIGNIFICANT CHANGE UP (ref 7–23)
BUN SERPL-MCNC: 13 MG/DL — SIGNIFICANT CHANGE UP (ref 7–23)
CALCIUM SERPL-MCNC: 8.4 MG/DL — SIGNIFICANT CHANGE UP (ref 8.4–10.5)
CALCIUM SERPL-MCNC: 8.7 MG/DL — SIGNIFICANT CHANGE UP (ref 8.4–10.5)
CALCIUM SERPL-MCNC: 9 MG/DL — SIGNIFICANT CHANGE UP (ref 8.4–10.5)
CHLORIDE SERPL-SCNC: 100 MMOL/L — SIGNIFICANT CHANGE UP (ref 96–108)
CHLORIDE SERPL-SCNC: 102 MMOL/L — SIGNIFICANT CHANGE UP (ref 96–108)
CHLORIDE SERPL-SCNC: 104 MMOL/L — SIGNIFICANT CHANGE UP (ref 96–108)
CHOLEST SERPL-MCNC: 111 MG/DL — SIGNIFICANT CHANGE UP
CO2 SERPL-SCNC: 28 MMOL/L — SIGNIFICANT CHANGE UP (ref 22–31)
CO2 SERPL-SCNC: 29 MMOL/L — SIGNIFICANT CHANGE UP (ref 22–31)
CO2 SERPL-SCNC: 32 MMOL/L — HIGH (ref 22–31)
CREAT SERPL-MCNC: 0.5 MG/DL — SIGNIFICANT CHANGE UP (ref 0.5–1.3)
CREAT SERPL-MCNC: 0.53 MG/DL — SIGNIFICANT CHANGE UP (ref 0.5–1.3)
CREAT SERPL-MCNC: 0.59 MG/DL — SIGNIFICANT CHANGE UP (ref 0.5–1.3)
ESTIMATED AVERAGE GLUCOSE: 117 MG/DL — HIGH (ref 68–114)
GLUCOSE SERPL-MCNC: 111 MG/DL — HIGH (ref 70–99)
GLUCOSE SERPL-MCNC: 87 MG/DL — SIGNIFICANT CHANGE UP (ref 70–99)
GLUCOSE SERPL-MCNC: 98 MG/DL — SIGNIFICANT CHANGE UP (ref 70–99)
HCT VFR BLD CALC: 39.7 % — SIGNIFICANT CHANGE UP (ref 34.5–45)
HDLC SERPL-MCNC: 43 MG/DL — LOW
HGB BLD-MCNC: 12.7 G/DL — SIGNIFICANT CHANGE UP (ref 11.5–15.5)
LIPID PNL WITH DIRECT LDL SERPL: 48 MG/DL — SIGNIFICANT CHANGE UP
MAGNESIUM SERPL-MCNC: 1.8 MG/DL — SIGNIFICANT CHANGE UP (ref 1.6–2.6)
MAGNESIUM SERPL-MCNC: 2.1 MG/DL — SIGNIFICANT CHANGE UP (ref 1.6–2.6)
MCHC RBC-ENTMCNC: 28.7 PG — SIGNIFICANT CHANGE UP (ref 27–34)
MCHC RBC-ENTMCNC: 32 GM/DL — SIGNIFICANT CHANGE UP (ref 32–36)
MCV RBC AUTO: 89.6 FL — SIGNIFICANT CHANGE UP (ref 80–100)
NON HDL CHOLESTEROL: 68 MG/DL — SIGNIFICANT CHANGE UP
NRBC # BLD: 0 /100 WBCS — SIGNIFICANT CHANGE UP (ref 0–0)
PHOSPHATE SERPL-MCNC: 2.2 MG/DL — LOW (ref 2.5–4.5)
PLATELET # BLD AUTO: 292 K/UL — SIGNIFICANT CHANGE UP (ref 150–400)
POTASSIUM SERPL-MCNC: 3 MMOL/L — LOW (ref 3.5–5.3)
POTASSIUM SERPL-MCNC: 3.3 MMOL/L — LOW (ref 3.5–5.3)
POTASSIUM SERPL-SCNC: 3 MMOL/L — LOW (ref 3.5–5.3)
POTASSIUM SERPL-SCNC: 3.3 MMOL/L — LOW (ref 3.5–5.3)
RBC # BLD: 4.43 M/UL — SIGNIFICANT CHANGE UP (ref 3.8–5.2)
RBC # FLD: 12.5 % — SIGNIFICANT CHANGE UP (ref 10.3–14.5)
SODIUM SERPL-SCNC: 139 MMOL/L — SIGNIFICANT CHANGE UP (ref 135–145)
SODIUM SERPL-SCNC: 141 MMOL/L — SIGNIFICANT CHANGE UP (ref 135–145)
SODIUM SERPL-SCNC: 142 MMOL/L — SIGNIFICANT CHANGE UP (ref 135–145)
TRIGL SERPL-MCNC: 100 MG/DL — SIGNIFICANT CHANGE UP
TSH SERPL-MCNC: 0.41 UIU/ML — SIGNIFICANT CHANGE UP (ref 0.27–4.2)
WBC # BLD: 11.87 K/UL — HIGH (ref 3.8–10.5)
WBC # FLD AUTO: 11.87 K/UL — HIGH (ref 3.8–10.5)

## 2022-01-27 PROCEDURE — 99221 1ST HOSP IP/OBS SF/LOW 40: CPT

## 2022-01-27 PROCEDURE — 93306 TTE W/DOPPLER COMPLETE: CPT | Mod: 26

## 2022-01-27 PROCEDURE — 99223 1ST HOSP IP/OBS HIGH 75: CPT

## 2022-01-27 RX ORDER — METOPROLOL TARTRATE 50 MG
12.5 TABLET ORAL EVERY 12 HOURS
Refills: 0 | Status: DISCONTINUED | OUTPATIENT
Start: 2022-01-27 | End: 2022-01-27

## 2022-01-27 RX ORDER — POTASSIUM CHLORIDE 20 MEQ
10 PACKET (EA) ORAL ONCE
Refills: 0 | Status: COMPLETED | OUTPATIENT
Start: 2022-01-27 | End: 2022-01-27

## 2022-01-27 RX ORDER — MAGNESIUM SULFATE 500 MG/ML
2 VIAL (ML) INJECTION ONCE
Refills: 0 | Status: COMPLETED | OUTPATIENT
Start: 2022-01-27 | End: 2022-01-27

## 2022-01-27 RX ORDER — POTASSIUM CHLORIDE 20 MEQ
20 PACKET (EA) ORAL
Refills: 0 | Status: DISCONTINUED | OUTPATIENT
Start: 2022-01-27 | End: 2022-01-27

## 2022-01-27 RX ORDER — ENOXAPARIN SODIUM 100 MG/ML
40 INJECTION SUBCUTANEOUS EVERY 24 HOURS
Refills: 0 | Status: DISCONTINUED | OUTPATIENT
Start: 2022-01-27 | End: 2022-02-01

## 2022-01-27 RX ORDER — CHOLECALCIFEROL (VITAMIN D3) 125 MCG
2000 CAPSULE ORAL DAILY
Refills: 0 | Status: DISCONTINUED | OUTPATIENT
Start: 2022-01-27 | End: 2022-02-02

## 2022-01-27 RX ORDER — ASPIRIN/CALCIUM CARB/MAGNESIUM 324 MG
300 TABLET ORAL DAILY
Refills: 0 | Status: DISCONTINUED | OUTPATIENT
Start: 2022-01-27 | End: 2022-01-28

## 2022-01-27 RX ORDER — ATORVASTATIN CALCIUM 80 MG/1
80 TABLET, FILM COATED ORAL AT BEDTIME
Refills: 0 | Status: DISCONTINUED | OUTPATIENT
Start: 2022-01-27 | End: 2022-02-02

## 2022-01-27 RX ADMIN — Medication 100 MILLIEQUIVALENT(S): at 11:20

## 2022-01-27 RX ADMIN — Medication 300 MILLIGRAM(S): at 11:21

## 2022-01-27 RX ADMIN — ENOXAPARIN SODIUM 40 MILLIGRAM(S): 100 INJECTION SUBCUTANEOUS at 11:21

## 2022-01-27 RX ADMIN — Medication 100 MILLIEQUIVALENT(S): at 19:26

## 2022-01-27 RX ADMIN — Medication 50 MILLIEQUIVALENT(S): at 09:36

## 2022-01-27 RX ADMIN — Medication 25 GRAM(S): at 16:54

## 2022-01-27 NOTE — PATIENT PROFILE ADULT - FALL HARM RISK - HARM RISK INTERVENTIONS
Assistance with ambulation/Assistance OOB with selected safe patient handling equipment/Communicate Risk of Fall with Harm to all staff/Discuss with provider need for PT consult/Monitor gait and stability/Reinforce activity limits and safety measures with patient and family/Tailored Fall Risk Interventions/Visual Cue: Yellow wristband and red socks/Bed in lowest position, wheels locked, appropriate side rails in place/Call bell, personal items and telephone in reach/Instruct patient to call for assistance before getting out of bed or chair/Non-slip footwear when patient is out of bed/[place] to call system/Physically safe environment - no spills, clutter or unnecessary equipment/Purposeful Proactive Rounding/Room/bathroom lighting operational, light cord in reach

## 2022-01-27 NOTE — PHYSICAL THERAPY INITIAL EVALUATION ADULT - RANGE OF MOTION EXAMINATION, REHAB EVAL
left foot deformity/bilateral upper extremity ROM was WFL (within functional limits)/bilateral lower extremity ROM was WFL (within functional limits)/deficits as listed below

## 2022-01-27 NOTE — CONSULT NOTE ADULT - SUBJECTIVE AND OBJECTIVE BOX
PAST MEDICAL & SURGICAL HISTORY:  Dementia      Home Medications:  aspirin 81 mg oral tablet, chewable: 1 tab(s) orally once a day (27 Mar 2020 15:11)  atorvastatin 80 mg oral tablet: 1 tab(s) orally once a day (at bedtime) (27 Mar 2020 15:11)  metoprolol: 1 tab(s) orally 2 times a day (27 Mar 2020 15:11)      MEDICATIONS  (STANDING):  aspirin Suppository 300 milliGRAM(s) Rectal daily  atorvastatin 80 milliGRAM(s) Oral at bedtime  cholecalciferol 2000 Unit(s) Oral daily  enoxaparin Injectable 40 milliGRAM(s) SubCutaneous every 24 hours  magnesium sulfate  IVPB 2 Gram(s) IV Intermittent once    MEDICATIONS  (PRN):      .  VITAL SIGNS:  T(C): 36.2 (22 @ 09:07), Max: 36.9 (22 @ 18:41)  T(F): 97.1 (22 @ 09:07), Max: 98.5 (22 @ 18:41)  HR: 62 (22 @ 11:18) (52 - 65)  BP: 157/94 (22 @ 11:18) (134/60 - 189/79)  BP(mean): 118 (22 @ 11:18) (87 - 118)  RR: 37 (22 @ 11:18) (13 - 37)  SpO2: 94% (22 @ 11:18) (92% - 99%)  Wt(kg): --    PHYSICAL EXAM:    Constitutional: WDWN resting comfortably in bed; NAD  Head: NC/AT  Eyes: PERRL, EOMI, anicteric sclera  ENT: no nasal discharge; uvula midline, no oropharyngeal erythema or exudates; MMM  Neck: supple; no JVD or thyromegaly  Respiratory: CTA B/L; no W/R/R, no retractions  Cardiac: +S1/S2; RRR; no M/R/G; PMI non-displaced  Gastrointestinal: soft, NT/ND; no rebound or guarding; +BSx4  Genitourinary: normal external genitalia  Back: spine midline, no bony tenderness or step-offs; no CVAT B/L  Extremities: WWP, no clubbing or cyanosis; no peripheral edema  Musculoskeletal: NROM x4; no joint swelling, tenderness or erythema  Vascular: 2+ radial, femoral, DP/PT pulses B/L  Dermatologic: skin warm, dry and intact; no rashes, wounds, or scars  Lymphatic: no submandibular or cervical LAD  Neurologic: AAOx3; CNII-XII grossly intact; no focal deficits  Psychiatric: affect and characteristics of appearance, verbalizations, behaviors are appropriate      .  LABS:                         12.7   11.87 )-----------( 292      ( 2022 06:54 )             39.7         142  |  102  |  13  ----------------------------<  111<H>  3.0<L>   |  32<H>  |  0.59    Ca    9.0      2022 06:53  Phos  2.2       Mg     1.8         TPro  6.2  /  Alb  2.9<L>  /  TBili  0.4  /  DBili  x   /  AST  47<H>  /  ALT  13  /  AlkPhos  65      PT/INR - ( 2022 17:44 )   PT: 13.3 sec;   INR: 1.11          PTT - ( 2022 17:44 )  PTT:29.9 sec  Urinalysis Basic - ( 2022 18:21 )    Color: Yellow / Appearance: SL Cloudy / S.025 / pH: x  Gluc: x / Ketone: NEGATIVE  / Bili: Negative / Urobili: 0.2 E.U./dL   Blood: x / Protein: NEGATIVE mg/dL / Nitrite: NEGATIVE   Leuk Esterase: NEGATIVE / RBC: x / WBC x   Sq Epi: x / Non Sq Epi: x / Bacteria: x          RADIOLOGY, EKG & ADDITIONAL TESTS: Reviewed.     < from: TTE Echo Complete w/ Contrast w/ Doppler (20 @ 09:29) >  CONCLUSIONS:     1. The aortic valve is calcified. There is no evidence of aortic regurgitation.   2. The mitral valve is mild to moderately thickened. Systolic anterior motion of the mitral valve resulting in an LVOT gradient of 62.00 mmHg and severe LVOT obstruction. There is probably moderate mitral regurgitation.   3. The rightventricle is normal in size. Right ventricular systolic function is normal.   4. There is hypertrophy of the basal septum. Left ventricular ejection fraction is 21%. Normal basal wall motion. Rest of the segments are severely hypokinetic to akinetic.Consider stress induced cardiomyopathy.   5. Trivial pericardial effusion. Bilateral pleural effusion noted.    < end of copied text >      < from: CT Angio Chest PE Protocol w/ IV Cont (20 @ 15:31) >  IMPRESSION: No evidence of PE.  Worsening of lung findings in the left lower lobe. Please see the report of the prior CT chest dated 3/18/2020. There is now evidence of near complete consolidation collapse left lower lobe. An endobronchial lesion cannot be ruled out. Could represent (postobstructive) pneumonia. Trace left pleural effusion.    < end of copied text >  < from: CT Angio Chest PE Protocol w/ IV Cont (20 @ 15:31) >  he heart is normal in size. No pericardial effusion is seen. No mediastinal, hilar or axillary lymphadenopathy is seen. Asymmetric multinodular goiter noted left greater than right. 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 found to have subacute left frontoparietal infarct, with course complicated by Bradyarrhythmia for which cardiology is being consulted    Patient is non verbal and unable to communicate. She is non verbal. Tele reviewed. Patient with Sinus bradycardia as low as 42, with goofd chronotropy with the HR back at 60. Patient has been off BB for 2 days.     PAST MEDICAL & SURGICAL HISTORY:  Dementia      Home Medications:  aspirin 81 mg oral tablet, chewable: 1 tab(s) orally once a day (27 Mar 2020 15:11)  atorvastatin 80 mg oral tablet: 1 tab(s) orally once a day (at bedtime) (27 Mar 2020 15:11)  metoprolol: 1 tab(s) orally 2 times a day (27 Mar 2020 15:11)      MEDICATIONS  (STANDING):  aspirin Suppository 300 milliGRAM(s) Rectal daily  atorvastatin 80 milliGRAM(s) Oral at bedtime  cholecalciferol 2000 Unit(s) Oral daily  enoxaparin Injectable 40 milliGRAM(s) SubCutaneous every 24 hours  magnesium sulfate  IVPB 2 Gram(s) IV Intermittent once    MEDICATIONS  (PRN):      .  VITAL SIGNS:  T(C): 36.2 (22 @ 09:07), Max: 36.9 (22 @ 18:41)  T(F): 97.1 (22 @ 09:07), Max: 98.5 (22 @ 18:41)  HR: 62 (22 @ 11:18) (52 - 65)  BP: 157/94 (22 @ 11:18) (134/60 - 189/79)  BP(mean): 118 (22 @ 11:18) (87 - 118)  RR: 37 (22 @ 11:18) (13 - 37)  SpO2: 94% (22 @ 11:18) (92% - 99%)  Wt(kg): --    PHYSICAL EXAM:    Constitutional: WDWN resting comfortably in bed; NAD  Head: NC/AT  Eyes: PERRL, EOMI, anicteric sclera  ENT: no nasal discharge; uvula midline, no oropharyngeal erythema or exudates; MMM  Neck: supple; no JVD or thyromegaly  Respiratory: CTA B/L; no W/R/R, no retractions  Cardiac: +S1/S2; RRR; no M/R/G; PMI non-displaced  Gastrointestinal: soft, NT/ND; no rebound or guarding; +BSx4  Genitourinary: normal external genitalia  Back: spine midline, no bony tenderness or step-offs; no CVAT B/L  Extremities: WWP, no clubbing or cyanosis; no peripheral edema  Musculoskeletal: NROM x4; no joint swelling, tenderness or erythema  Vascular: 2+ radial, femoral, DP/PT pulses B/L  Dermatologic: skin warm, dry and intact; no rashes, wounds, or scars  Lymphatic: no submandibular or cervical LAD  Neurologic: AAOx3; CNII-XII grossly intact; no focal deficits  Psychiatric: affect and characteristics of appearance, verbalizations, behaviors are appropriate      .  LABS:                         12.7   11.87 )-----------( 292      ( 2022 06:54 )             39.7         142  |  102  |  13  ----------------------------<  111<H>  3.0<L>   |  32<H>  |  0.59    Ca    9.0      2022 06:53  Phos  2.2       Mg     1.8         TPro  6.2  /  Alb  2.9<L>  /  TBili  0.4  /  DBili  x   /  AST  47<H>  /  ALT  13  /  AlkPhos  65      PT/INR - ( 2022 17:44 )   PT: 13.3 sec;   INR: 1.11          PTT - ( 2022 17:44 )  PTT:29.9 sec  Urinalysis Basic - ( 2022 18:21 )    Color: Yellow / Appearance: SL Cloudy / S.025 / pH: x  Gluc: x / Ketone: NEGATIVE  / Bili: Negative / Urobili: 0.2 E.U./dL   Blood: x / Protein: NEGATIVE mg/dL / Nitrite: NEGATIVE   Leuk Esterase: NEGATIVE / RBC: x / WBC x   Sq Epi: x / Non Sq Epi: x / Bacteria: x          RADIOLOGY, EKG & ADDITIONAL TESTS: Reviewed.     < from: TTE Echo Complete w/ Contrast w/ Doppler (20 @ 09:29) >  CONCLUSIONS:     1. The aortic valve is calcified. There is no evidence of aortic regurgitation.   2. The mitral valve is mild to moderately thickened. Systolic anterior motion of the mitral valve resulting in an LVOT gradient of 62.00 mmHg and severe LVOT obstruction. There is probably moderate mitral regurgitation.   3. The rightventricle is normal in size. Right ventricular systolic function is normal.   4. There is hypertrophy of the basal septum. Left ventricular ejection fraction is 21%. Normal basal wall motion. Rest of the segments are severely hypokinetic to akinetic.Consider stress induced cardiomyopathy.   5. Trivial pericardial effusion. Bilateral pleural effusion noted.    < end of copied text >      < from: CT Angio Chest PE Protocol w/ IV Cont (20 @ 15:31) >  IMPRESSION: No evidence of PE.  Worsening of lung findings in the left lower lobe. Please see the report of the prior CT chest dated 3/18/2020. There is now evidence of near complete consolidation collapse left lower lobe. An endobronchial lesion cannot be ruled out. Could represent (postobstructive) pneumonia. Trace left pleural effusion.    < end of copied text >  < from: CT Angio Chest PE Protocol w/ IV Cont (20 @ 15:31) >  he heart is normal in size. No pericardial effusion is seen. No mediastinal, hilar or axillary lymphadenopathy is seen. Asymmetric multinodular goiter noted left greater than right. NG tube     < end of copied text >     Patient is an 80 yo Female with PMHx of ICM 2/2 CAD with EF of 15-20%, HOCM with Gradient of 62, with recent hospitalization for severe apical akinesis and hypodynamic basal wall suggestive of Takatsubos Cardiomyopathy, admitted with AMS from [place]. found to have subacute left frontoparietal infarct, with course complicated by Bradyarrhythmia for which cardiology is being consulted    Patient is non verbal and unable to communicate. She is non verbal. Tele reviewed. Patient with Sinus bradycardia as low as 42, with goofd chronotropy with the HR back at 60. Patient has been off BB for 2 days.     Tele Reviewed: patient with sinus bradycardia with fixed DE interval and intermittent short pause.     PAST MEDICAL & SURGICAL HISTORY:  Dementia      Home Medications:  aspirin 81 mg oral tablet, chewable: 1 tab(s) orally once a day (27 Mar 2020 15:11)  atorvastatin 80 mg oral tablet: 1 tab(s) orally once a day (at bedtime) (27 Mar 2020 15:11)  metoprolol: 1 tab(s) orally 2 times a day (27 Mar 2020 15:11)      MEDICATIONS  (STANDING):  aspirin Suppository 300 milliGRAM(s) Rectal daily  atorvastatin 80 milliGRAM(s) Oral at bedtime  cholecalciferol 2000 Unit(s) Oral daily  enoxaparin Injectable 40 milliGRAM(s) SubCutaneous every 24 hours  magnesium sulfate  IVPB 2 Gram(s) IV Intermittent once    MEDICATIONS  (PRN):      .  VITAL SIGNS:  T(C): 36.2 (22 @ 09:07), Max: 36.9 (22 @ 18:41)  T(F): 97.1 (22 @ 09:07), Max: 98.5 (22 @ 18:41)  HR: 62 (22 @ 11:18) (52 - 65)  BP: 157/94 (22 @ 11:18) (134/60 - 189/79)  BP(mean): 118 (22 @ 11:18) (87 - 118)  RR: 37 (22 @ 11:18) (13 - 37)  SpO2: 94% (22 @ 11:18) (92% - 99%)  Wt(kg): --    PHYSICAL EXAM:    Constitutional: WDWN resting comfortably in bed; NAD  Head: NC/AT  ENT: Very Dry MM  Neck: supple; no JVD  Respiratory: CTA B/L;   Cardiac: +S1/S2; RRr, [place]; no M/R/G;   Gastrointestinal: soft, NT/ND; no rebound or guarding; +BS  Extremities: WWP, no peripheral edema  Vascular: 2+ radial, femoral, DP/PT pulses B/L      .  LABS:                         12.7   11.87 )-----------( 292      ( 2022 06:54 )             39.7         142  |  102  |  13  ----------------------------<  111<H>  3.0<L>   |  32<H>  |  0.59    Ca    9.0      2022 06:53  Phos  2.2       Mg     1.8         TPro  6.2  /  Alb  2.9<L>  /  TBili  0.4  /  DBili  x   /  AST  47<H>  /  ALT  13  /  AlkPhos  65      PT/INR - ( 2022 17:44 )   PT: 13.3 sec;   INR: 1.11          PTT - ( 2022 17:44 )  PTT:29.9 sec  Urinalysis Basic - ( 2022 18:21 )    Color: Yellow / Appearance: SL Cloudy / S.025 / pH: x  Gluc: x / Ketone: NEGATIVE  / Bili: Negative / Urobili: 0.2 E.U./dL   Blood: x / Protein: NEGATIVE mg/dL / Nitrite: NEGATIVE   Leuk Esterase: NEGATIVE / RBC: x / WBC x   Sq Epi: x / Non Sq Epi: x / Bacteria: x          RADIOLOGY, EKG & ADDITIONAL TESTS: Reviewed.     < from: TTE Echo Complete w/ Contrast w/ Doppler (20 @ 09:29) >  CONCLUSIONS:     1. The aortic valve is calcified. There is no evidence of aortic regurgitation.   2. The mitral valve is mild to moderately thickened. Systolic anterior motion of the mitral valve resulting in an LVOT gradient of 62.00 mmHg and severe LVOT obstruction. There is probably moderate mitral regurgitation.   3. The rightventricle is normal in size. Right ventricular systolic function is normal.   4. There is hypertrophy of the basal septum. Left ventricular ejection fraction is 21%. Normal basal wall motion. Rest of the segments are severely hypokinetic to akinetic.Consider stress induced cardiomyopathy.   5. Trivial pericardial effusion. Bilateral pleural effusion noted.    < end of copied text >      < from: CT Angio Chest PE Protocol w/ IV Cont (20 @ 15:31) >  IMPRESSION: No evidence of PE.  Worsening of lung findings in the left lower lobe. Please see the report of the prior CT chest dated 3/18/2020. There is now evidence of near complete consolidation collapse left lower lobe. An endobronchial lesion cannot be ruled out. Could represent (postobstructive) pneumonia. Trace left pleural effusion.    < end of copied text >  < from: CT Angio Chest PE Protocol w/ IV Cont (20 @ 15:31) >  he heart is normal in size. No pericardial effusion is seen. No mediastinal, hilar or axillary lymphadenopathy is seen. Asymmetric multinodular goiter noted left greater than right. NG tube     < end of copied text >

## 2022-01-27 NOTE — PROGRESS NOTE ADULT - ASSESSMENT
81y Female with PMHx of CHF (reduced EF), HTN, hx of STEMI in the past, dementia, COVID in 2020 and recently with COVID 13 days ago (unclear vaccination status) who presented to the ED brought in from [facility] due to altered mental status and hypokalemia. CT/MRI done which showed early subacute ischemia left parietal lobe with few punctate foci of subacute ischemia high posterior left frontal lobe. therefore, admitted to stroke team for further workup.     Neuro  #CVA workup  - aspirin 300 suppository only for now   - atorvastatin 80mg daily  - lovenox sq for DVT prophylaxis  - MRI Brain without contrast early subacute ischemia left parietal lobe with few punctate foci of subacute ischemia high posterior left frontal lobe.  - Stroke Code HCT Results: Area of hypodensity is noted in the left high frontoparietal lobe which is suspicious for acute/subacute   infarction.  - Stroke Code CTA Results:  1. Bilateral pleural effusions.  2. Food debris/secretions in the dependent esophagus compatible with   gastroesophageal reflux.  3. Multinodular thyromegaly.  4. No acute vascular findings.  - obtain collateral from family/rehab    Cards  - stroke subacute, no need for permissive HTN, may continue with normotension  #CHF   - hold lisinipril & metoprolol for now due to bradycardia   - TTE with bubble  #bradycardia  - second degree type 1 heart block on tele   - cards consult in am     #HLD  - high dose statin as above in CVA  - LDL results: pending    Pulm  - call provider if SPO2 < 94%  - consider pulm consult due to bilateral pleural effusions     GI  #Nutrition/Fluids/Electrolytes   - replete K<4 and Mg <2  #hypokalemic in the ED  - repleted by ED  - f/u AM K  - Diet: NPO, failed swallow  - Bedside swallow eval in the AM       Infectious Disease  - Stroke Code CXR results:  Left lower lobe consolidation. Consider chest CT for further   evaluation. Cardiomegaly  - recent COVID 1 infection, obtain collateral from rehab, may have received course of abx for PNA       Endocrine  - A1C results: pending       - TSH results: pending, thyroidmegaly on CTA     DVT Prophylaxis  - lovenox sq for DVT prophylaxis   - SCDs for DVT prophylaxis     Dispo:  pending PT/OT eval    81y Female with PMHx of CHF (reduced EF), HTN, hx of STEMI in the past, dementia, COVID in 2020 and recently with COVID 13 days ago (unclear vaccination status) who presented to the ED brought in from [facility] due to altered mental status and hypokalemia. CT/MRI done which showed early subacute ischemia left parietal lobe with few punctate foci of subacute ischemia high posterior left frontal lobe. therefore, admitted to stroke team for further workup.     Neuro  #CVA workup  - aspirin 300 suppository only for now   - atorvastatin 80mg daily  - lovenox sq for DVT prophylaxis  - MRI Brain without contrast early subacute ischemia left parietal lobe with few punctate foci of subacute ischemia high posterior left frontal lobe.  - Stroke Code HCT Results: Area of hypodensity is noted in the left high frontoparietal lobe which is suspicious for acute/subacute   infarction.  - Stroke Code CTA Results: Bilateral pleural effusions, Food debris/secretions in the dependent esophagus compatible with   gastroesophageal reflux, Multinodular thyromegaly.  - obtain collateral from family/rehab  - consider palliative care    Cards  - stroke subacute, no need for permissive HTN, may continue with normotension  #CHF   - hold lisinopril & metoprolol for now due to bradycardia   - TTE with bubble    #bradycardia  - second degree type 1 heart block on tele   - cards following, appreciate recs     #HLD  - high dose statin as above in CVA  - LDL results: pending    Pulm  - call provider if SPO2 < 94%  - consider pulm consult due to bilateral pleural effusions     GI  #Nutrition/Fluids/Electrolytes   - replete K<4 and Mg <2  - pt hypokalemic in ED, repleted, AM potassium 3.0, repletion in progress  - repleted Mg  - Diet: NPO, failed swallow  - Bedside swallow eval in the AM     Infectious Disease  - Stroke Code CXR results:  Left lower lobe consolidation. Consider chest CT for further   evaluation. Cardiomegaly  - recent COVID infection, obtain collateral from rehab, may have received course of abx for PNA     Endocrine  - A1C results: 5.7    - TSH results: .410, thyromegaly on CTA     DVT Prophylaxis  - lovenox sq for DVT prophylaxis   - SCDs for DVT prophylaxis     IDR Goals: Goals reviewed at interdisciplinary rounds with case management, social work, physical therapy, occupational therapy, and speech language pathology.   Please see specific therapy  notes for in depth goals.    Dispo: return to [place], no additional needs per PT/OT     Discussed daily hospital plans and goals with patient and family at bedside. (Called and updated family.)    Discussed with Neurology Attending Dr. Lester

## 2022-01-27 NOTE — H&P ADULT - NSHPPHYSICALEXAM_GEN_ALL_CORE
Physical exam:  General: No acute distress, awake and alert  Cardiovascular: bradycardic, Regular rate and rhythm, no murmurs, rubs, or gallops. No bruits  Pulmonary: Anterior breath sounds clear bilaterally, no crackles or wheezing. No use of accessory muscles  GI: Abdomen soft, non-tender, non-distended    Neurologic:  -Mental status: A&Ox0, eyes open, not following commands, non intelligible speech  -Cranial nerves:   II: right hemianopsia   III, IV, VI: left gaze preference, able to overcome  VII: slight right facial asymmetry   Motor/sensation: spontaneous movements of the left side to antigravity, grimaces and withdraws to noxious on the right side  Coordination: unable to assess    NIHSS: 24

## 2022-01-27 NOTE — CONSULT NOTE ADULT - PROBLEM SELECTOR RECOMMENDATION 5
small; as seen on imaging; Pt. does not appear to be short of breath and is not hypoxic; likely due to known CHF, f/u TTE

## 2022-01-27 NOTE — CONSULT NOTE ADULT - PROBLEM SELECTOR RECOMMENDATION 9
MRI shows "evolving acute infarct in the left distal MCA vascular territory distribution, no hemorrhage;" cont. work-up and mgmt per Neuro, PT/OT, NPO pending SLP evaluation; suggest Palliative Care consult

## 2022-01-27 NOTE — PROGRESS NOTE ADULT - SUBJECTIVE AND OBJECTIVE BOX
Neurology Stroke Progress Note    INTERVAL HPI/OVERNIGHT EVENTS:  Pt admitted overnight to unit. Pt sleeping comfortably, in no acute distress Patient seen and examined.  number ______ used.    MEDICATIONS  (STANDING):  aspirin Suppository 300 milliGRAM(s) Rectal daily  atorvastatin 80 milliGRAM(s) Oral at bedtime  cholecalciferol 2000 Unit(s) Oral daily  enoxaparin Injectable 40 milliGRAM(s) SubCutaneous every 24 hours  magnesium sulfate  IVPB 2 Gram(s) IV Intermittent once  potassium chloride  10 mEq/100 mL IVPB 10 milliEquivalent(s) IV Intermittent once    MEDICATIONS  (PRN):      Allergies    No Known Allergies    Intolerances        Vital Signs Last 24 Hrs  T(C): 36.2 (2022 09:07), Max: 36.9 (2022 18:41)  T(F): 97.1 (2022 09:07), Max: 98.5 (2022 18:41)  HR: 53 (2022 08:51) (52 - 64)  BP: 168/76 (2022 08:51) (134/60 - 189/79)  BP(mean): 109 (2022 08:51) (87 - 109)  RR: 17 (2022 08:51) (13 - 18)  SpO2: 95% (2022 08:51) (92% - 99%)    Physical exam:  General: No acute distress, awake and alert  Eyes: Anicteric sclerae, moist conjunctivae, see below for CNs  Neck: trachea midline, FROM, supple, no thyromegaly or lymphadenopathy  Cardiovascular: Regular rate and rhythm, no murmurs, rubs, or gallops. No carotid bruits.   Pulmonary: Anterior breath sounds clear bilaterally, no crackles or wheezing. No use of accessory muscles  GI: Abdomen soft, non-distended, non-tender  Extremities: Radial and DP pulses +2, no edema    Neurologic:  -Mental status: Awake, alert, oriented to person, place, and time. Speech is fluent with intact naming, repetition, and comprehension, no dysarthria. Recent and remote memory intact. Follows commands. Attention/concentration intact. Fund of knowledge appropriate.  -Cranial nerves:   II: Visual fields are full to confrontation.  III, IV, VI: Extraocular movements are intact without nystagmus. Pupils equally round and reactive to light  V:  Facial sensation V1-V3 equal and intact   VII: Face is symmetric with normal eye closure and smile  VIII: Hearing is bilaterally intact to finger rub  IX, X: Uvula is midline and soft palate rises symmetrically  XI: Head turning and shoulder shrug are intact.  XII: Tongue protrudes midline  Motor: Normal bulk and tone. No pronator drift. Strength bilateral upper extremity 5/5, bilateral lower extremities 5/5.  Rapid alternating movements intact and symmetric  Sensation: Intact to light touch bilaterally. No neglect or extinction on double simultaneous testing.  Coordination: No dysmetria on finger-to-nose and heel-to-shin bilaterally  Reflexes: Downgoing toes bilaterally   Gait: Narrow gait and steady    LABS:                        12.7   11.87 )-----------( 292      ( 2022 06:54 )             39.7         142  |  102  |  13  ----------------------------<  111<H>  3.0<L>   |  32<H>  |  0.59    Ca    9.0      2022 06:53  Phos  2.2       Mg     1.8         TPro  6.2  /  Alb  2.9<L>  /  TBili  0.4  /  DBili  x   /  AST  47<H>  /  ALT  13  /  AlkPhos  65      PT/INR - ( 2022 17:44 )   PT: 13.3 sec;   INR: 1.11          PTT - ( 2022 17:44 )  PTT:29.9 sec  Urinalysis Basic - ( 2022 18:21 )    Color: Yellow / Appearance: SL Cloudy / S.025 / pH: x  Gluc: x / Ketone: NEGATIVE  / Bili: Negative / Urobili: 0.2 E.U./dL   Blood: x / Protein: NEGATIVE mg/dL / Nitrite: NEGATIVE   Leuk Esterase: NEGATIVE / RBC: x / WBC x   Sq Epi: x / Non Sq Epi: x / Bacteria: x        RADIOLOGY & ADDITIONAL TESTS:     Neurology Stroke Progress Note    INTERVAL HPI/OVERNIGHT EVENTS:  Pt admitted overnight to unit. Pt sleeping comfortably, in no acute distress. No verbal output, pt not engaged in encounter. Unable to obtain ROS.     MEDICATIONS  (STANDING):  aspirin Suppository 300 milliGRAM(s) Rectal daily  atorvastatin 80 milliGRAM(s) Oral at bedtime  cholecalciferol 2000 Unit(s) Oral daily  enoxaparin Injectable 40 milliGRAM(s) SubCutaneous every 24 hours  magnesium sulfate  IVPB 2 Gram(s) IV Intermittent once  potassium chloride  10 mEq/100 mL IVPB 10 milliEquivalent(s) IV Intermittent once    MEDICATIONS  (PRN):      Allergies    No Known Allergies    Intolerances    Vital Signs Last 24 Hrs  T(C): 36.2 (2022 09:07), Max: 36.9 (2022 18:41)  T(F): 97.1 (2022 09:07), Max: 98.5 (2022 18:41)  HR: 53 (2022 08:51) (52 - 64)  BP: 168/76 (2022 08:51) (134/60 - 189/79)  BP(mean): 109 (2022 08:51) (87 - 109)  RR: 17 (2022 08:51) (13 - 18)  SpO2: 95% (2022 08:51) (92% - 99%)    Physical Exam:   General: No acute distress, awake and alert  Cardiovascular: bradycardic, Regular rate and rhythm, no murmurs, rubs, or gallops. No bruits  Pulmonary: Anterior breath sounds clear bilaterally, no crackles or wheezing. No use of accessory muscles  GI: Abdomen soft, non-tender, non-distended    Neurologic:  -Mental status: A&Ox0, eyes open, not following commands, non intelligible speech  -Cranial nerves:   II: right hemianopsia   III, IV, VI: left gaze preference, able to overcome  VII: slight right facial asymmetry   Motor/sensation: spontaneous movements of the left side to antigravity, grimaces and withdraws to noxious on the right side  Coordination: unable to assess    LABS:                        12.7   11.87 )-----------( 292      ( 2022 06:54 )             39.7         142  |  102  |  13  ----------------------------<  111<H>  3.0<L>   |  32<H>  |  0.59    Ca    9.0      2022 06:53  Phos  2.2       Mg     1.8         TPro  6.2  /  Alb  2.9<L>  /  TBili  0.4  /  DBili  x   /  AST  47<H>  /  ALT  13  /  AlkPhos  65      PT/INR - ( 2022 17:44 )   PT: 13.3 sec;   INR: 1.11          PTT - ( 2022 17:44 )  PTT:29.9 sec  Urinalysis Basic - ( 2022 18:21 )    Color: Yellow / Appearance: SL Cloudy / S.025 / pH: x  Gluc: x / Ketone: NEGATIVE  / Bili: Negative / Urobili: 0.2 E.U./dL   Blood: x / Protein: NEGATIVE mg/dL / Nitrite: NEGATIVE   Leuk Esterase: NEGATIVE / RBC: x / WBC x   Sq Epi: x / Non Sq Epi: x / Bacteria: x      RADIOLOGY & ADDITIONAL TESTS:

## 2022-01-27 NOTE — OCCUPATIONAL THERAPY INITIAL EVALUATION ADULT - ANTICIPATED DISCHARGE DISPOSITION, OT EVAL
Subacute Rehab. Pt D/C from inpatient rehab at this time./rehabilitation facility D/C from inpatient rehab as pt total/dependent care at baseline./extended care/rehabilitation facility

## 2022-01-27 NOTE — H&P ADULT - ASSESSMENT
IV-tPA (Y/N):    no                Reason IV-tPA not given: out of the window, subacute stroke on MRI    ASSESSMENT/PLAN:  1y Female with PMHx of CHF (reduced EF), HTN, hx of STEMI in the past, dementia, COVID in 2020 and recently with COVID 13 days ago (unclear vaccination status) who presented to the ED brought in from [facility] due to altered mental status and hypokalemia. CT/MRI done which showed early subacute ischemia left parietal lobe with few punctate foci of subacute ischemia high posterior left frontal lobe. therefore, admitted to stroke team for further workup.     Neuro  #CVA workup  - aspirin 300 suppository only for now   - atorvastatin 80mg daily  - lovenox sq for DVT prophylaxis  - MRI Brain without contrast early subacute ischemia left parietal lobe with few punctate foci of subacute ischemia high posterior left frontal lobe.  - Stroke Code HCT Results: Area of hypodensity is noted in the left high frontoparietal lobe which is suspicious for acute/subacute   infarction.  - Stroke Code CTA Results:  1. Bilateral pleural effusions.  2. Food debris/secretions in the dependent esophagus compatible with   gastroesophageal reflux.  3. Multinodular thyromegaly.  4. No acute vascular findings.  - obtain collateral from family/rehab    Cards  - stroke subacute, no need for permissive HTN, may continue with normotension  #CHF   - hold lisinipril & metoprolol for now due to bradycardia   - TTE with bubble  #bradycardia  - second degree type 1 heart block on tele   - cards consult in am     #HLD  - high dose statin as above in CVA  - LDL results: pending    Pulm  - call provider if SPO2 < 94%  - consider pulm consult due to bilateral pleural effusions     GI  #Nutrition/Fluids/Electrolytes   - replete K<4 and Mg <2  - Diet: NPO, failed swallow  - Bedside swallow eval in the AM       Infectious Disease  - Stroke Code CXR results:  Left lower lobe consolidation. Consider chest CT for further   evaluation. Cardiomegaly  - recent COVID 1 infection, obtain collateral from rehab, may have received course of abx for PNA       Endocrine  - A1C results: pending       - TSH results: pending, thyroidmegaly on CTA     DVT Prophylaxis  - lovenox sq for DVT prophylaxis   - SCDs for DVT prophylaxis     Dispo:  pending PT/OT eval  IV-tPA (Y/N):    no                Reason IV-tPA not given: out of the window, subacute stroke on MRI    ASSESSMENT/PLAN:  81y Female with PMHx of CHF (reduced EF), HTN, hx of STEMI in the past, dementia, COVID in 2020 and recently with COVID 13 days ago (unclear vaccination status) who presented to the ED brought in from [facility] due to altered mental status and hypokalemia. CT/MRI done which showed early subacute ischemia left parietal lobe with few punctate foci of subacute ischemia high posterior left frontal lobe. therefore, admitted to stroke team for further workup.     Neuro  #CVA workup  - aspirin 300 suppository only for now   - atorvastatin 80mg daily  - lovenox sq for DVT prophylaxis  - MRI Brain without contrast early subacute ischemia left parietal lobe with few punctate foci of subacute ischemia high posterior left frontal lobe.  - Stroke Code HCT Results: Area of hypodensity is noted in the left high frontoparietal lobe which is suspicious for acute/subacute   infarction.  - Stroke Code CTA Results:  1. Bilateral pleural effusions.  2. Food debris/secretions in the dependent esophagus compatible with   gastroesophageal reflux.  3. Multinodular thyromegaly.  4. No acute vascular findings.  - obtain collateral from family/rehab    Cards  - stroke subacute, no need for permissive HTN, may continue with normotension  #CHF   - hold lisinipril & metoprolol for now due to bradycardia   - TTE with bubble  #bradycardia  - second degree type 1 heart block on tele   - cards consult in am     #HLD  - high dose statin as above in CVA  - LDL results: pending    Pulm  - call provider if SPO2 < 94%  - consider pulm consult due to bilateral pleural effusions     GI  #Nutrition/Fluids/Electrolytes   - replete K<4 and Mg <2  #hypokalemic in the ED  - repleted by ED  - f/u AM K  - Diet: NPO, failed swallow  - Bedside swallow eval in the AM       Infectious Disease  - Stroke Code CXR results:  Left lower lobe consolidation. Consider chest CT for further   evaluation. Cardiomegaly  - recent COVID 1 infection, obtain collateral from rehab, may have received course of abx for PNA       Endocrine  - A1C results: pending       - TSH results: pending, thyroidmegaly on CTA     DVT Prophylaxis  - lovenox sq for DVT prophylaxis   - SCDs for DVT prophylaxis     Dispo:  pending PT/OT eval

## 2022-01-27 NOTE — OCCUPATIONAL THERAPY INITIAL EVALUATION ADULT - MANUAL MUSCLE TESTING RESULTS, REHAB EVAL
pt able to spontaneously move BUE and LLE. MMT not formally assessed 2/2 decreased command following however, LUE/LLE 3/5, RUE 3-/5 RLE.

## 2022-01-27 NOTE — CONSULT NOTE ADULT - ASSESSMENT
per Neurology    80 yo Female with PMHx of CHF (reduced EF), HTN, hx of STEMI in the past, dementia, COVID in 2020 and recently with COVID 13 days ago (unclear vaccination status) who presented to the ED brought in from [facility] due to altered mental status and hypokalemia. CT/MRI done which showed early subacute ischemia left parietal lobe with few punctate foci of subacute ischemia high posterior left frontal lobe. therefore, admitted to stroke team for further workup.     Neuro  #CVA workup  - aspirin 300 suppository only for now   - atorvastatin 80mg daily  - lovenox sq for DVT prophylaxis  - MRI Brain without contrast early subacute ischemia left parietal lobe with few punctate foci of subacute ischemia high posterior left frontal lobe.  - Stroke Code HCT Results: Area of hypodensity is noted in the left high frontoparietal lobe which is suspicious for acute/subacute   infarction.  - Stroke Code CTA Results:  1. Bilateral pleural effusions.  2. Food debris/secretions in the dependent esophagus compatible with   gastroesophageal reflux.  3. Multinodular thyromegaly.  4. No acute vascular findings.  - obtain collateral from family/rehab    Cards  - stroke subacute, no need for permissive HTN, may continue with normotension  #CHF   - hold lisinipril & metoprolol for now due to bradycardia   - TTE with bubble  #bradycardia  - second degree type 1 heart block on tele   - cards consult in am     #HLD  - high dose statin as above in CVA  - LDL results: pending    Pulm  - call provider if SPO2 < 94%  - consider pulm consult due to bilateral pleural effusions     GI  #Nutrition/Fluids/Electrolytes   - replete K<4 and Mg <2  #hypokalemic in the ED  - repleted by ED  - f/u AM K  - Diet: NPO, failed swallow  - Bedside swallow eval in the AM       Infectious Disease  - Stroke Code CXR results:  Left lower lobe consolidation. Consider chest CT for further   evaluation. Cardiomegaly  - recent COVID 1 infection, obtain collateral from rehab, may have received course of abx for PNA       Endocrine  - A1C results: pending       - TSH results: pending, thyroidmegaly on CTA     DVT Prophylaxis  - lovenox sq for DVT prophylaxis   - SCDs for DVT prophylaxis

## 2022-01-27 NOTE — PHYSICAL THERAPY INITIAL EVALUATION ADULT - PERTINENT HX OF CURRENT PROBLEM, REHAB EVAL
81y Female with PMHx of CHF (reduced EF), HTN, hx of STEMI in the past, dementia, COVID in 2020 and recently with COVID 13 days ago (unclear vaccination status) who presented to the ED brought in from [facility] due to altered mental status and hypokalemia. CT/MRI done which showed subacute left frontoparietal infarct, therefore, admitted to stroke team for further workup.

## 2022-01-27 NOTE — CONSULT NOTE ADULT - ASSESSMENT
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 Suspect gradient likely lower now  -Primary team concerned over bradycardia noted on tele. Patient non verbal and unable to relay symptoms  -She has good chronotropy however given HR able to Increase to the 60's with movement and off BB  -At this time would continue to keep off Lopressor and any CCB  -Patient with overall poor prognosis. Noted that on previous hospitalization Palliative and Ethics were consulted for her care.  -Would continue to manage conservatively.  -No need for further cardiac intervention

## 2022-01-27 NOTE — OCCUPATIONAL THERAPY INITIAL EVALUATION ADULT - LIVES WITH, PROFILE
Pt resides at [facility]. Pt is total care according to notes in Pt's chart.

## 2022-01-27 NOTE — H&P ADULT - HISTORY OF PRESENT ILLNESS
**STROKE HPI***  (All info obtained from chart, unable to contact family or doctor for collateral)    HPI: 81y Female with PMHx of CHF (reduced EF), HTN, hx of STEMI in the past, dementia, COVID in 2020 and recently with COVID 13 days ago (unclear vaccination status) who presented to the ED brought in from [facility] due to altered mental status and hypokalemia. CT/MRI done which showed subacute left frontoparietal infarct, therefore, admitted to stroke team for further workup.   Unable to obtain ROS from patient, as patient A&Ox3, not following commands and unintelligible speech. Attempted to reach family, however, no response.   As per info obtained from the ED, the patient is normally A&O x3, however, has been increasingly lethargic and confused, which was initially attributed to COVID.     T(C): 36.1 (01-27-22 @ 00:48), Max: 36.9 (01-26-22 @ 18:41)  HR: 52 (01-27-22 @ 04:00) (52 - 64)  BP: 134/60 (01-27-22 @ 04:00) (134/60 - 189/79)  RR: 13 (01-27-22 @ 04:00) (13 - 18)  SpO2: 94% (01-27-22 @ 04:00) (92% - 99%)    Medications taking at Rehab facility:   aspirin 81mg daily  atorvastatin 20mg daily   cholecalfierol 1000 unit 2 tab daily   dronabinal 2.5 daily for anorexia   Lovenox 40 units subq daily for DVT prophylaxis   lisinipril 2.5 mg daily for HF  metoprolol tartate 12.5 mg two times a day for HF    Vital Signs Last 24 Hrs  T(C): 36.1 (27 Jan 2022 00:48), Max: 36.9 (26 Jan 2022 18:41)  T(F): 97 (27 Jan 2022 00:48), Max: 98.5 (26 Jan 2022 18:41)  HR: 52 (27 Jan 2022 04:00) (52 - 64)  BP: 134/60 (27 Jan 2022 04:00) (134/60 - 189/79)  BP(mean): 87 (27 Jan 2022 04:00) (87 - 106)  RR: 13 (27 Jan 2022 04:00) (13 - 18)  SpO2: 94% (27 Jan 2022 04:00) (92% - 99%)

## 2022-01-27 NOTE — PHYSICAL THERAPY INITIAL EVALUATION ADULT - ADDITIONAL COMMENTS
Pt. is non-verbal and is a poor historian, information obtained from paperwork from rehab : patient is total care prior to admission. Pt. with history of dementia.

## 2022-01-27 NOTE — H&P ADULT - NSHPLABSRESULTS_GEN_ALL_CORE
LABS:                        11.8   8.43  )-----------( 288      ( 2022 17:44 )             37.0         143  |  107  |  17  ----------------------------<  127<H>  2.9<LL>   |  28  |  0.68    Ca    8.3<L>      2022 17:44  Mg     1.9         TPro  6.2  /  Alb  2.9<L>  /  TBili  0.4  /  DBili  x   /  AST  47<H>  /  ALT  13  /  AlkPhos  65      PT/INR - ( 2022 17:44 )   PT: 13.3 sec;   INR: 1.11          PTT - ( 2022 17:44 )  PTT:29.9 sec      Urinalysis Basic - ( 2022 18:21 )    Color: Yellow / Appearance: SL Cloudy / S.025 / pH: x  Gluc: x / Ketone: NEGATIVE  / Bili: Negative / Urobili: 0.2 E.U./dL   Blood: x / Protein: NEGATIVE mg/dL / Nitrite: NEGATIVE   Leuk Esterase: NEGATIVE / RBC: x / WBC x   Sq Epi: x / Non Sq Epi: x / Bacteria: x        RADIOLOGY & ADDITIONAL STUDIES:    HCT:  CT Head No Cont (22 @ 20:13) >    IMPRESSION:  Study degraded by streak artifacts. Area of hypodensity is noted in the   left high frontoparietal lobe which is suspicious for acute/subacute   infarction.    No acute intracranial hemorrhage is seen.      CTA:  CT Angio Neck w/ IV Cont (22 @ 23:59) >    IMPRESSION:  1. Bilateral pleural effusions.  2. Food debris/secretions in the dependent esophagus compatible with   gastroesophageal reflux.  3. Multinodular thyromegaly.  4. No acute vascular findings.    CT Angio Head w/ IV Cont (22 @ 23:59) >  IMPRESSION:  1. Partial opacification of the right maxillary sinus compatible with   sinusitis.  2. No acute vascular findings.    MR Head No Cont (22 @ 23:09) >    IMPRESSION:  Positive early subacute ischemia left parietal lobe with few punctate   foci of subacute ischemia high posterior left frontal lobe.      -----------------------------------------------------------------------------------------------------------------

## 2022-01-27 NOTE — CONSULT NOTE ADULT - ATTENDING COMMENTS
Initial attending contact date 1/27/22 . See fellow note written above for details. I reviewed the fellow documentation. I have personally seen and examined this patient. I reviewed vitals, labs, medications, cardiac studies, and additional imaging. I agree with the above fellow's findings and plans as written above with the following additions/statements.      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

## 2022-01-27 NOTE — OCCUPATIONAL THERAPY INITIAL EVALUATION ADULT - PERTINENT HX OF CURRENT PROBLEM, REHAB EVAL
81y Female with PMHx of CHF (reduced EF), HTN, hx of STEMI in the past, dementia, COVID in 2020 and recently with COVID 13 days ago (unclear vaccination status) who presented to the ED brought in from [facility] due to altered mental status and hypokalemia. CT/MRI done which showed subacute left frontoparietal infarct, therefore, admitted to stroke team for further workup.

## 2022-01-27 NOTE — CONSULT NOTE ADULT - SUBJECTIVE AND OBJECTIVE BOX
Patient is a 81y old  Female who presents with a chief complaint of AMS, stroke on CT/MRI (2022 13:21)    HPI:   **STROKE HPI***  (All info obtained from chart, unable to contact family or doctor for collateral)    HPI: 81y Female with PMHx of CHF (reduced EF), HTN, hx of STEMI in the past, dementia, COVID in 2020 and recently with COVID 13 days ago (unclear vaccination status) who presented to the ED brought in from [facility] due to altered mental status and hypokalemia. CT/MRI done which showed subacute left frontoparietal infarct, therefore, admitted to stroke team for further workup.   Unable to obtain ROS from patient, as patient A&Ox3, not following commands and unintelligible speech. Attempted to reach family, however, no response.   As per info obtained from the ED, the patient is normally A&O x3, however, has been increasingly lethargic and confused, which was initially attributed to COVID.     T(C): 36.1 (22 @ 00:48), Max: 36.9 (22 @ 18:41)  HR: 52 (22 @ 04:00) (52 - 64)  BP: 134/60 (22 @ 04:00) (134/60 - 189/79)  RR: 13 (22 @ 04:00) (13 - 18)  SpO2: 94% (22 @ 04:00) (92% - 99%)    Medications taking at Rehab facility:   aspirin 81mg daily  atorvastatin 20mg daily   cholecalfierol 1000 unit 2 tab daily   dronabinal 2.5 daily for anorexia   Lovenox 40 units subq daily for DVT prophylaxis   lisinipril 2.5 mg daily for HF  metoprolol tartate 12.5 mg two times a day for HF    (2022 00:39)    Review of Systems: 12 point review of systems otherwise negative    PAST MEDICAL & SURGICAL HISTORY:  Dementia    Social History:  came from nursing home  ([place])  unable to obtain rest of social history (2022 00:39)    FAMILY HISTORY:  non-contributory    MEDICATIONS  (STANDING):  aspirin Suppository 300 milliGRAM(s) Rectal daily  atorvastatin 80 milliGRAM(s) Oral at bedtime  cholecalciferol 2000 Unit(s) Oral daily  enoxaparin Injectable 40 milliGRAM(s) SubCutaneous every 24 hours  magnesium sulfate  IVPB 2 Gram(s) IV Intermittent once    MEDICATIONS  (PRN):      Allergies    No Known Allergies    Intolerances          Vital Signs Last 24 Hrs  T(C): 36.2 (2022 09:07), Max: 36.9 (2022 18:41)  T(F): 97.1 (2022 09:07), Max: 98.5 (2022 18:41)  HR: 62 (2022 11:18) (52 - 65)  BP: 157/94 (2022 11:18) (134/60 - 189/79)  BP(mean): 118 (2022 11:18) (87 - 118)  RR: 37 (2022 11:18) (13 - 37)  SpO2: 94% (2022 11:18) (92% - 99%)  CAPILLARY BLOOD GLUCOSE      POCT Blood Glucose.: 124 mg/dL (2022 16:55)       @ 07: @ 07:00  --------------------------------------------------------  IN: 0 mL / OUT: 1000 mL / NET: -1000 mL     @ 07: @ 15:23  --------------------------------------------------------  IN: 0 mL / OUT: 800 mL / NET: -800 mL        Physical Exam:  (earlier today)  Daily Height in cm: 150 (2022 16:57)    Daily Weight in k.2 (2022 00:48)  General:  comfortable-appearing in NAD  HEENT:  MMM  CV:  RRR  Lungs:  CTA B/L anteriorly; normal WOB on RA  Abdomen:  soft NT ND  Extremities:  no edema B/L LE  Skin:  WWP  :  +PrimaFit  Neuro:  AAOx0, doesn't follow commands    LABS:                        12.7   11.87 )-----------( 292      ( 2022 06:54 )             39.7         142  |  102  |  13  ----------------------------<  111<H>  3.0<L>   |  32<H>  |  0.59    Ca    9.0      2022 06:53  Phos  2.2       Mg     1.8         TPro  6.2  /  Alb  2.9<L>  /  TBili  0.4  /  DBili  x   /  AST  47<H>  /  ALT  13  /  AlkPhos  65      PT/INR - ( 2022 17:44 )   PT: 13.3 sec;   INR: 1.11          PTT - ( 2022 17:44 )  PTT:29.9 sec  Urinalysis Basic - ( 2022 18:21 )    Color: Yellow / Appearance: SL Cloudy / S.025 / pH: x  Gluc: x / Ketone: NEGATIVE  / Bili: Negative / Urobili: 0.2 E.U./dL   Blood: x / Protein: NEGATIVE mg/dL / Nitrite: NEGATIVE   Leuk Esterase: NEGATIVE / RBC: x / WBC x   Sq Epi: x / Non Sq Epi: x / Bacteria: x

## 2022-01-28 DIAGNOSIS — I50.22 CHRONIC SYSTOLIC (CONGESTIVE) HEART FAILURE: ICD-10-CM

## 2022-01-28 DIAGNOSIS — I42.1 OBSTRUCTIVE HYPERTROPHIC CARDIOMYOPATHY: ICD-10-CM

## 2022-01-28 DIAGNOSIS — I34.0 NONRHEUMATIC MITRAL (VALVE) INSUFFICIENCY: ICD-10-CM

## 2022-01-28 LAB
ANION GAP SERPL CALC-SCNC: 10 MMOL/L — SIGNIFICANT CHANGE UP (ref 5–17)
BUN SERPL-MCNC: 14 MG/DL — SIGNIFICANT CHANGE UP (ref 7–23)
BUN SERPL-MCNC: 15 MG/DL — SIGNIFICANT CHANGE UP (ref 7–23)
CALCIUM SERPL-MCNC: 8.4 MG/DL — SIGNIFICANT CHANGE UP (ref 8.4–10.5)
CHLORIDE SERPL-SCNC: 101 MMOL/L — SIGNIFICANT CHANGE UP (ref 96–108)
CO2 SERPL-SCNC: 27 MMOL/L — SIGNIFICANT CHANGE UP (ref 22–31)
CO2 SERPL-SCNC: 30 MMOL/L — SIGNIFICANT CHANGE UP (ref 22–31)
CREAT SERPL-MCNC: 0.51 MG/DL — SIGNIFICANT CHANGE UP (ref 0.5–1.3)
GLUCOSE BLDC GLUCOMTR-MCNC: 100 MG/DL — HIGH (ref 70–99)
GLUCOSE BLDC GLUCOMTR-MCNC: 123 MG/DL — HIGH (ref 70–99)
GLUCOSE BLDC GLUCOMTR-MCNC: 77 MG/DL — SIGNIFICANT CHANGE UP (ref 70–99)
GLUCOSE SERPL-MCNC: 78 MG/DL — SIGNIFICANT CHANGE UP (ref 70–99)
HCT VFR BLD CALC: 40.7 % — SIGNIFICANT CHANGE UP (ref 34.5–45)
HGB BLD-MCNC: 13.1 G/DL — SIGNIFICANT CHANGE UP (ref 11.5–15.5)
MAGNESIUM SERPL-MCNC: 2.1 MG/DL — SIGNIFICANT CHANGE UP (ref 1.6–2.6)
MCHC RBC-ENTMCNC: 28.8 PG — SIGNIFICANT CHANGE UP (ref 27–34)
MCHC RBC-ENTMCNC: 32.2 GM/DL — SIGNIFICANT CHANGE UP (ref 32–36)
MCV RBC AUTO: 89.5 FL — SIGNIFICANT CHANGE UP (ref 80–100)
NRBC # BLD: 0 /100 WBCS — SIGNIFICANT CHANGE UP (ref 0–0)
PHOSPHATE SERPL-MCNC: 2.5 MG/DL — SIGNIFICANT CHANGE UP (ref 2.5–4.5)
PLATELET # BLD AUTO: 287 K/UL — SIGNIFICANT CHANGE UP (ref 150–400)
POTASSIUM SERPL-MCNC: 3.2 MMOL/L — LOW (ref 3.5–5.3)
POTASSIUM SERPL-MCNC: 3.4 MMOL/L — LOW (ref 3.5–5.3)
POTASSIUM SERPL-MCNC: 3.6 MMOL/L — SIGNIFICANT CHANGE UP (ref 3.5–5.3)
POTASSIUM SERPL-SCNC: 3.2 MMOL/L — LOW (ref 3.5–5.3)
POTASSIUM SERPL-SCNC: 3.4 MMOL/L — LOW (ref 3.5–5.3)
POTASSIUM SERPL-SCNC: 3.6 MMOL/L — SIGNIFICANT CHANGE UP (ref 3.5–5.3)
RBC # BLD: 4.55 M/UL — SIGNIFICANT CHANGE UP (ref 3.8–5.2)
RBC # FLD: 12.6 % — SIGNIFICANT CHANGE UP (ref 10.3–14.5)
SODIUM SERPL-SCNC: 141 MMOL/L — SIGNIFICANT CHANGE UP (ref 135–145)
WBC # BLD: 8.57 K/UL — SIGNIFICANT CHANGE UP (ref 3.8–10.5)
WBC # FLD AUTO: 8.57 K/UL — SIGNIFICANT CHANGE UP (ref 3.8–10.5)

## 2022-01-28 PROCEDURE — 71045 X-RAY EXAM CHEST 1 VIEW: CPT | Mod: 26

## 2022-01-28 PROCEDURE — 74018 RADEX ABDOMEN 1 VIEW: CPT | Mod: 26

## 2022-01-28 PROCEDURE — 99233 SBSQ HOSP IP/OBS HIGH 50: CPT

## 2022-01-28 RX ORDER — POTASSIUM CHLORIDE 20 MEQ
20 PACKET (EA) ORAL EVERY 4 HOURS
Refills: 0 | Status: DISCONTINUED | OUTPATIENT
Start: 2022-01-28 | End: 2022-01-28

## 2022-01-28 RX ORDER — POTASSIUM CHLORIDE 20 MEQ
10 PACKET (EA) ORAL
Refills: 0 | Status: COMPLETED | OUTPATIENT
Start: 2022-01-28 | End: 2022-01-28

## 2022-01-28 RX ORDER — SODIUM CHLORIDE 9 MG/ML
1000 INJECTION, SOLUTION INTRAVENOUS
Refills: 0 | Status: DISCONTINUED | OUTPATIENT
Start: 2022-01-28 | End: 2022-01-29

## 2022-01-28 RX ORDER — POTASSIUM CHLORIDE 20 MEQ
40 PACKET (EA) ORAL ONCE
Refills: 0 | Status: DISCONTINUED | OUTPATIENT
Start: 2022-01-28 | End: 2022-01-28

## 2022-01-28 RX ORDER — CLOPIDOGREL BISULFATE 75 MG/1
75 TABLET, FILM COATED ORAL DAILY
Refills: 0 | Status: DISCONTINUED | OUTPATIENT
Start: 2022-01-29 | End: 2022-01-29

## 2022-01-28 RX ORDER — POTASSIUM CHLORIDE 20 MEQ
40 PACKET (EA) ORAL ONCE
Refills: 0 | Status: COMPLETED | OUTPATIENT
Start: 2022-01-28 | End: 2022-01-28

## 2022-01-28 RX ORDER — POTASSIUM CHLORIDE 20 MEQ
10 PACKET (EA) ORAL
Refills: 0 | Status: DISCONTINUED | OUTPATIENT
Start: 2022-01-28 | End: 2022-01-28

## 2022-01-28 RX ORDER — ASPIRIN/CALCIUM CARB/MAGNESIUM 324 MG
81 TABLET ORAL EVERY 24 HOURS
Refills: 0 | Status: DISCONTINUED | OUTPATIENT
Start: 2022-01-29 | End: 2022-02-02

## 2022-01-28 RX ADMIN — Medication 100 MILLIEQUIVALENT(S): at 09:28

## 2022-01-28 RX ADMIN — Medication 100 MILLIEQUIVALENT(S): at 10:24

## 2022-01-28 RX ADMIN — Medication 300 MILLIGRAM(S): at 11:20

## 2022-01-28 RX ADMIN — ATORVASTATIN CALCIUM 80 MILLIGRAM(S): 80 TABLET, FILM COATED ORAL at 21:54

## 2022-01-28 RX ADMIN — SODIUM CHLORIDE 70 MILLILITER(S): 9 INJECTION, SOLUTION INTRAVENOUS at 12:59

## 2022-01-28 RX ADMIN — Medication 100 MILLIEQUIVALENT(S): at 11:19

## 2022-01-28 RX ADMIN — ENOXAPARIN SODIUM 40 MILLIGRAM(S): 100 INJECTION SUBCUTANEOUS at 11:19

## 2022-01-28 RX ADMIN — Medication 40 MILLIEQUIVALENT(S): at 19:08

## 2022-01-28 NOTE — PROGRESS NOTE ADULT - ASSESSMENT
81y Female with PMHx of CHF (reduced EF), HTN, hx of STEMI in the past, dementia, COVID in 2020 and recently with COVID 13 days ago (unclear vaccination status) who presented to the ED brought in from [facility] due to altered mental status and hypokalemia. CT/MRI done which showed early subacute ischemia left parietal lobe with few punctate foci of subacute ischemia high posterior left frontal lobe. therefore, admitted to stroke team for further workup.     Neuro  #CVA workup  - aspirin 300 suppository only for now   - atorvastatin 80mg daily  - lovenox sq for DVT prophylaxis  - MRI Brain without contrast early subacute ischemia left parietal lobe with few punctate foci of subacute ischemia high posterior left frontal lobe.  - Stroke Code HCT Results: Area of hypodensity is noted in the left high frontoparietal lobe which is suspicious for acute/subacute   infarction.  - Stroke Code CTA Results: Bilateral pleural effusions, Food debris/secretions in the dependent esophagus compatible with   gastroesophageal reflux, Multinodular thyromegaly.  - obtain collateral from family/rehab  - consider palliative care    Cards  - stroke subacute, no need for permissive HTN, may continue with normotension  #CHF   - hold lisinopril & metoprolol for now due to bradycardia   - TTE with bubble    #bradycardia  - second degree type 1 heart block on tele   - cards following, appreciate recs     #HLD  - high dose statin as above in CVA  - LDL results: 48    Pulm  - call provider if SPO2 < 94%  - consider pulm consult due to bilateral pleural effusions     GI  #Nutrition/Fluids/Electrolytes   - replete K<4 and Mg <2  - pt hypokalemic in ED, repleted, AM potassium 3.0, repletion in progress  - repleted Mg  - Diet: NPO, failed swallow  - f/u speech and swallow    Infectious Disease  - Stroke Code CXR results:  Left lower lobe consolidation. Consider chest CT for further   evaluation. Cardiomegaly  - recent COVID infection, obtain collateral from rehab, may have received course of abx for PNA     Endocrine  - A1C results: 5.7    - TSH results: .410, thyromegaly on CTA     DVT Prophylaxis  - lovenox sq for DVT prophylaxis   - SCDs for DVT prophylaxis     IDR Goals: Goals reviewed at interdisciplinary rounds with case management, social work, physical therapy, occupational therapy, and speech language pathology.   Please see specific therapy  notes for in depth goals.    Dispo: return to [place], no additional needs per PT/OT     Discussed daily hospital plans and goals with patient and family at bedside. (Called and updated family.)    Discussed with Neurology Attending Dr. Lester 81y Female with PMHx of CHF (reduced EF), HTN, hx of STEMI in the past, dementia, COVID in 2020 and recently with COVID 13 days ago (unclear vaccination status) who presented to the ED brought in from [facility] due to altered mental status and hypokalemia. CT/MRI done which showed early subacute ischemia left parietal lobe with few punctate foci of subacute ischemia high posterior left frontal lobe. therefore, admitted to stroke team for further workup.     Neuro  #CVA workup  - aspirin 300 suppository only for now   - atorvastatin 80mg daily  - lovenox sq for DVT prophylaxis  - MRI Brain without contrast early subacute ischemia left parietal lobe with few punctate foci of subacute ischemia high posterior left frontal lobe.  - Stroke Code HCT Results: Area of hypodensity is noted in the left high frontoparietal lobe which is suspicious for acute/subacute   infarction.  - Stroke Code CTA Results: Bilateral pleural effusions, Food debris/secretions in the dependent esophagus compatible with   gastroesophageal reflux, Multinodular thyromegaly.  - obtain collateral from family/rehab  - palliative consult placed.    Cards  - stroke subacute, no need for permissive HTN, may continue with normotension  #CHF   - hold lisinopril & metoprolol for now due to bradycardia   - TTE with bubble    #bradycardia  - second degree type 1 heart block on tele   - cards following, appreciate recs     #HLD  - high dose statin as above in CVA  - LDL results: 48    Pulm  - call provider if SPO2 < 94%  - consider pulm consult due to bilateral pleural effusions     GI  #Nutrition/Fluids/Electrolytes   - replete K<4 and Mg <2  - pt hypokalemic, continue to replete prn  - repleted Mg  - Diet: will plan to start tube feeds through ngt  - pt unable to participate in speech and swallow exam    Infectious Disease  - Stroke Code CXR results:  Left lower lobe consolidation. Consider chest CT for further   evaluation. Cardiomegaly  - recent COVID infection, obtain collateral from rehab, may have received course of abx for PNA     Endocrine  - A1C results: 5.7    - TSH results: .410, thyromegaly on CTA     DVT Prophylaxis  - lovenox sq for DVT prophylaxis   - SCDs for DVT prophylaxis     IDR Goals: Goals reviewed at interdisciplinary rounds with case management, social work, physical therapy, occupational therapy, and speech language pathology.   Please see specific therapy  notes for in depth goals.    Dispo: return to [facility], no additional needs per PT/OT     Discussed daily hospital plans and goals with patient and family at bedside. (Called and updated family.)    Discussed with Neurology Attending Dr. Lester

## 2022-01-28 NOTE — PROGRESS NOTE ADULT - SUBJECTIVE AND OBJECTIVE BOX
Neurology Stroke Progress Note    INTERVAL HPI/OVERNIGHT EVENTS:  Patient seen and examined. Unable to obtain subjective hx or ROS 2/2 to pt's mental status.     MEDICATIONS  (STANDING):  aspirin Suppository 300 milliGRAM(s) Rectal daily  atorvastatin 80 milliGRAM(s) Oral at bedtime  cholecalciferol 2000 Unit(s) Oral daily  enoxaparin Injectable 40 milliGRAM(s) SubCutaneous every 24 hours  potassium chloride  10 mEq/100 mL IVPB 10 milliEquivalent(s) IV Intermittent every 1 hour    MEDICATIONS  (PRN):      Allergies    No Known Allergies    Intolerances        Vital Signs Last 24 Hrs  T(C): 35.8 (2022 09:19), Max: 36.1 (2022 06:29)  T(F): 96.5 (2022 09:19), Max: 97 (2022 06:29)  HR: 53 (2022 08:50) (51 - 68)  BP: 138/75 (2022 08:50) (92/61 - 168/71)  BP(mean): 101 (2022 08:50) (72 - 118)  RR: 20 (2022 08:50) (14 - 37)  SpO2: 93% (2022 08:50) (93% - 96%)    Physical exam:  General: No acute distress, awake and alert  Eyes: Anicteric sclerae, moist conjunctivae, see below for CNs  Neck: trachea midline, FROM, supple, no thyromegaly or lymphadenopathy  Cardiovascular: Regular rate and irregular rhythm, no murmurs, rubs, or gallops. No carotid bruits.   Pulmonary: Anterior breath sounds clear bilaterally, no crackles or wheezing. No use of accessory muscles  GI: Abdomen soft, non-distended, non-tender  Extremities: Radial and DP pulses +2, no edema    Neurologic:  -Mental status: Awake, alert, oriented x0. Speech is   -Cranial nerves:   II: right hemianopsia   III, IV, VI: left gaze preference, able to overcome  VII: slight right facial asymmetry   Motor: spontaneous movements of the left side to antigravity, grimaces and withdraws to noxious on the right side  Sensation: Intact to light touch bilaterally. No neglect or extinction on double simultaneous testing.  Coordination: unable to assess    LABS:                        13.1   8.57  )-----------( 287      ( 2022 08:22 )             40.7         141  |  101  |  14  ----------------------------<  78  3.2<L>   |  30  |  0.51    Ca    8.4      2022 08:22  Phos  2.5       Mg     2.1         TPro  6.2  /  Alb  2.9<L>  /  TBili  0.4  /  DBili  x   /  AST  47<H>  /  ALT  13  /  AlkPhos  65      PT/INR - ( 2022 17:44 )   PT: 13.3 sec;   INR: 1.11          PTT - ( 2022 17:44 )  PTT:29.9 sec  Urinalysis Basic - ( 2022 18:21 )    Color: Yellow / Appearance: SL Cloudy / S.025 / pH: x  Gluc: x / Ketone: NEGATIVE  / Bili: Negative / Urobili: 0.2 E.U./dL   Blood: x / Protein: NEGATIVE mg/dL / Nitrite: NEGATIVE   Leuk Esterase: NEGATIVE / RBC: x / WBC x   Sq Epi: x / Non Sq Epi: x / Bacteria: x        RADIOLOGY & ADDITIONAL TESTS:     Neurology Stroke Progress Note    INTERVAL HPI/OVERNIGHT EVENTS:  Patient seen and examined. Unable to obtain subjective hx or ROS 2/2 to pt's mental status/dementia.     MEDICATIONS  (STANDING):  aspirin Suppository 300 milliGRAM(s) Rectal daily  atorvastatin 80 milliGRAM(s) Oral at bedtime  cholecalciferol 2000 Unit(s) Oral daily  enoxaparin Injectable 40 milliGRAM(s) SubCutaneous every 24 hours  potassium chloride  10 mEq/100 mL IVPB 10 milliEquivalent(s) IV Intermittent every 1 hour    MEDICATIONS  (PRN):      Allergies    No Known Allergies    Intolerances        Vital Signs Last 24 Hrs  T(C): 35.8 (2022 09:19), Max: 36.1 (2022 06:29)  T(F): 96.5 (2022 09:19), Max: 97 (2022 06:29)  HR: 53 (2022 08:50) (51 - 68)  BP: 138/75 (2022 08:50) (92/61 - 168/71)  BP(mean): 101 (2022 08:50) (72 - 118)  RR: 20 (2022 08:50) (14 - 37)  SpO2: 93% (2022 08:50) (93% - 96%)    Physical exam:  General: No acute distress, awake and alert  Eyes: Anicteric sclerae, moist conjunctivae, see below for CNs  Neck: trachea midline, FROM, supple, no thyromegaly or lymphadenopathy  Cardiovascular: Regular rate and irregular rhythm, no murmurs, rubs, or gallops. No carotid bruits.   Pulmonary: Anterior breath sounds clear bilaterally, no crackles or wheezing. No use of accessory muscles  GI: Abdomen soft, non-distended, non-tender  Extremities: Radial and DP pulses +2, no edema    Neurologic:  -Mental status: Awake, alert, oriented x0. Speech is   -Cranial nerves:   II: right hemianopsia   III, IV, VI: left gaze preference, able to overcome  VII: slight right facial asymmetry   Motor: spontaneous movements of the left side to antigravity, grimaces and withdraws to noxious on the right side  Sensation: Intact to light touch bilaterally. No neglect or extinction on double simultaneous testing.  Coordination: unable to assess    LABS:                        13.1   8.57  )-----------( 287      ( 2022 08:22 )             40.7         141  |  101  |  14  ----------------------------<  78  3.2<L>   |  30  |  0.51    Ca    8.4      2022 08:22  Phos  2.5       Mg     2.1         TPro  6.2  /  Alb  2.9<L>  /  TBili  0.4  /  DBili  x   /  AST  47<H>  /  ALT  13  /  AlkPhos  65      PT/INR - ( 2022 17:44 )   PT: 13.3 sec;   INR: 1.11          PTT - ( 2022 17:44 )  PTT:29.9 sec  Urinalysis Basic - ( 2022 18:21 )    Color: Yellow / Appearance: SL Cloudy / S.025 / pH: x  Gluc: x / Ketone: NEGATIVE  / Bili: Negative / Urobili: 0.2 E.U./dL   Blood: x / Protein: NEGATIVE mg/dL / Nitrite: NEGATIVE   Leuk Esterase: NEGATIVE / RBC: x / WBC x   Sq Epi: x / Non Sq Epi: x / Bacteria: x        RADIOLOGY & ADDITIONAL TESTS:

## 2022-01-28 NOTE — PROGRESS NOTE ADULT - SUBJECTIVE AND OBJECTIVE BOX
Patient is a 81y old  Female who presents with a chief complaint of AMS, stroke on CT/MRI (2022 07:29)      INTERVAL HPI/OVERNIGHT EVENTS:    Pt. seen and examined earlier today  No complaints elicited; ROS limited due to advanced dementia    Review of Systems: 12 point review of systems otherwise negative    MEDICATIONS  (STANDING):  aspirin Suppository 300 milliGRAM(s) Rectal daily  atorvastatin 80 milliGRAM(s) Oral at bedtime  cholecalciferol 2000 Unit(s) Oral daily  enoxaparin Injectable 40 milliGRAM(s) SubCutaneous every 24 hours  potassium chloride  10 mEq/100 mL IVPB 10 milliEquivalent(s) IV Intermittent every 1 hour    MEDICATIONS  (PRN):      Allergies    No Known Allergies    Intolerances          Vital Signs Last 24 Hrs  T(C): 35.8 (2022 09:19), Max: 36.1 (2022 06:29)  T(F): 96.5 (2022 09:19), Max: 97 (2022 06:29)  HR: 53 (2022 08:50) (51 - 68)  BP: 138/75 (2022 08:50) (92/61 - 168/71)  BP(mean): 101 (2022 08:50) (72 - 110)  RR: 20 (2022 08:50) (14 - 20)  SpO2: 93% (2022 08:50) (93% - 95%)  CAPILLARY BLOOD GLUCOSE           @ : @ 07:00  --------------------------------------------------------  IN: 300 mL / OUT: 1300 mL / NET: -1000 mL     @ : @ 11:44  --------------------------------------------------------  IN: 100 mL / OUT: 0 mL / NET: 100 mL        Physical Exam:  (earlier today)  Daily     Daily   General:  comfortable-appearing in NAD  HEENT:  MMM  CV:  RRR, no JVD  Lungs:  CTA B/L anteriorly; normal WOB on RA  Abdomen:  soft NT ND  Extremities:  no edema B/L LE  Skin:  WWP  :  +PrimaFit  Neuro:  AAOx0, doesn't follow commands    LABS:                        13.1   8.57  )-----------( 287      ( 2022 08:22 )             40.7         141  |  101  |  14  ----------------------------<  78  3.2<L>   |  30  |  0.51    Ca    8.4      2022 08:22  Phos  2.5       Mg     2.1         TPro  6.2  /  Alb  2.9<L>  /  TBili  0.4  /  DBili  x   /  AST  47<H>  /  ALT  13  /  AlkPhos  65      PT/INR - ( 2022 17:44 )   PT: 13.3 sec;   INR: 1.11          PTT - ( 2022 17:44 )  PTT:29.9 sec  Urinalysis Basic - ( 2022 18:21 )    Color: Yellow / Appearance: SL Cloudy / S.025 / pH: x  Gluc: x / Ketone: NEGATIVE  / Bili: Negative / Urobili: 0.2 E.U./dL   Blood: x / Protein: NEGATIVE mg/dL / Nitrite: NEGATIVE   Leuk Esterase: NEGATIVE / RBC: x / WBC x   Sq Epi: x / Non Sq Epi: x / Bacteria: x

## 2022-01-28 NOTE — GOALS OF CARE CONVERSATION - ADVANCED CARE PLANNING - CONVERSATION DETAILS
Pt's stepdaughter Lizet, Spoke to pt's stepdaughter Lizet who is listed as emergency contact and Lizet's daughter Erinn. Previous documentation in 3/2020 stating that Lizet did not want to make an medical decisions for pt. Lizet reports that her father was  to pt and he passed away a couple of years ago. Pt never appointed a health care proxy. Discussed with Lizet that pt has suffered a stroke and mental status is worse than baseline with right sided weakness. At this point, Lizet states she now feels comfortable with making medical decisions for pt. She states she last spoke to pt when she was at the nursing home less than a year ago. Discussed with Lizet that pt was unable to participate in swallow exam and therefore has been kept npo. Discussed nutrition and feeding and Lizet would like to temporary nutrition through NGT for now. Risks/benefits of peg tube was discussed however Lizet would like more time to see if pt improves. Also discussed DNR/DNI status, which Lizet will take some time to think about. She states she never had end of life/goals of care discussion with pt and would like to visit and see her in person before making decisions. Palliative consult also placed.

## 2022-01-29 DIAGNOSIS — R63.8 OTHER SYMPTOMS AND SIGNS CONCERNING FOOD AND FLUID INTAKE: ICD-10-CM

## 2022-01-29 DIAGNOSIS — U07.1 COVID-19: ICD-10-CM

## 2022-01-29 DIAGNOSIS — E78.5 HYPERLIPIDEMIA, UNSPECIFIED: ICD-10-CM

## 2022-01-29 LAB
ANION GAP SERPL CALC-SCNC: 7 MMOL/L — SIGNIFICANT CHANGE UP (ref 5–17)
ANION GAP SERPL CALC-SCNC: 8 MMOL/L — SIGNIFICANT CHANGE UP (ref 5–17)
ANION GAP SERPL CALC-SCNC: SIGNIFICANT CHANGE UP MMOL/L (ref 5–17)
BUN SERPL-MCNC: 17 MG/DL — SIGNIFICANT CHANGE UP (ref 7–23)
BUN SERPL-MCNC: 19 MG/DL — SIGNIFICANT CHANGE UP (ref 7–23)
CALCIUM SERPL-MCNC: 8.1 MG/DL — LOW (ref 8.4–10.5)
CALCIUM SERPL-MCNC: 8.2 MG/DL — LOW (ref 8.4–10.5)
CALCIUM SERPL-MCNC: SIGNIFICANT CHANGE UP (ref 8.4–10.5)
CHLORIDE SERPL-SCNC: 101 MMOL/L — SIGNIFICANT CHANGE UP (ref 96–108)
CHLORIDE SERPL-SCNC: 102 MMOL/L — SIGNIFICANT CHANGE UP (ref 96–108)
CHLORIDE SERPL-SCNC: SIGNIFICANT CHANGE UP (ref 96–108)
CO2 SERPL-SCNC: 28 MMOL/L — SIGNIFICANT CHANGE UP (ref 22–31)
CO2 SERPL-SCNC: 28 MMOL/L — SIGNIFICANT CHANGE UP (ref 22–31)
CREAT SERPL-MCNC: 0.5 MG/DL — SIGNIFICANT CHANGE UP (ref 0.5–1.3)
CREAT SERPL-MCNC: 0.51 MG/DL — SIGNIFICANT CHANGE UP (ref 0.5–1.3)
CREAT SERPL-MCNC: SIGNIFICANT CHANGE UP MG/DL (ref 0.5–1.3)
GLUCOSE BLDC GLUCOMTR-MCNC: 122 MG/DL — HIGH (ref 70–99)
GLUCOSE BLDC GLUCOMTR-MCNC: 148 MG/DL — HIGH (ref 70–99)
GLUCOSE BLDC GLUCOMTR-MCNC: 154 MG/DL — HIGH (ref 70–99)
GLUCOSE SERPL-MCNC: 153 MG/DL — HIGH (ref 70–99)
GLUCOSE SERPL-MCNC: 153 MG/DL — HIGH (ref 70–99)
GLUCOSE SERPL-MCNC: SIGNIFICANT CHANGE UP (ref 70–99)
HCT VFR BLD CALC: 36.7 % — SIGNIFICANT CHANGE UP (ref 34.5–45)
HGB BLD-MCNC: 12 G/DL — SIGNIFICANT CHANGE UP (ref 11.5–15.5)
MAGNESIUM SERPL-MCNC: 1.6 MG/DL — SIGNIFICANT CHANGE UP (ref 1.6–2.6)
MCHC RBC-ENTMCNC: 29.3 PG — SIGNIFICANT CHANGE UP (ref 27–34)
MCHC RBC-ENTMCNC: 32.7 GM/DL — SIGNIFICANT CHANGE UP (ref 32–36)
MCV RBC AUTO: 89.7 FL — SIGNIFICANT CHANGE UP (ref 80–100)
NRBC # BLD: 0 /100 WBCS — SIGNIFICANT CHANGE UP (ref 0–0)
PHOSPHATE SERPL-MCNC: 1.7 MG/DL — LOW (ref 2.5–4.5)
PLATELET # BLD AUTO: 267 K/UL — SIGNIFICANT CHANGE UP (ref 150–400)
POTASSIUM SERPL-MCNC: 4 MMOL/L — SIGNIFICANT CHANGE UP (ref 3.5–5.3)
POTASSIUM SERPL-MCNC: 4.1 MMOL/L — SIGNIFICANT CHANGE UP (ref 3.5–5.3)
POTASSIUM SERPL-SCNC: 4 MMOL/L — SIGNIFICANT CHANGE UP (ref 3.5–5.3)
POTASSIUM SERPL-SCNC: 4.1 MMOL/L — SIGNIFICANT CHANGE UP (ref 3.5–5.3)
RBC # BLD: 4.09 M/UL — SIGNIFICANT CHANGE UP (ref 3.8–5.2)
RBC # FLD: 12.8 % — SIGNIFICANT CHANGE UP (ref 10.3–14.5)
SODIUM SERPL-SCNC: 137 MMOL/L — SIGNIFICANT CHANGE UP (ref 135–145)
SODIUM SERPL-SCNC: 137 MMOL/L — SIGNIFICANT CHANGE UP (ref 135–145)
SODIUM SERPL-SCNC: SIGNIFICANT CHANGE UP (ref 135–145)
WBC # BLD: 10.7 K/UL — HIGH (ref 3.8–10.5)
WBC # FLD AUTO: 10.7 K/UL — HIGH (ref 3.8–10.5)

## 2022-01-29 PROCEDURE — 99233 SBSQ HOSP IP/OBS HIGH 50: CPT

## 2022-01-29 RX ORDER — MAGNESIUM SULFATE 500 MG/ML
2 VIAL (ML) INJECTION ONCE
Refills: 0 | Status: COMPLETED | OUTPATIENT
Start: 2022-01-29 | End: 2022-01-29

## 2022-01-29 RX ADMIN — ENOXAPARIN SODIUM 40 MILLIGRAM(S): 100 INJECTION SUBCUTANEOUS at 11:57

## 2022-01-29 RX ADMIN — ATORVASTATIN CALCIUM 80 MILLIGRAM(S): 80 TABLET, FILM COATED ORAL at 21:28

## 2022-01-29 RX ADMIN — Medication 2000 UNIT(S): at 11:57

## 2022-01-29 RX ADMIN — Medication 25 GRAM(S): at 16:02

## 2022-01-29 RX ADMIN — Medication 81 MILLIGRAM(S): at 11:57

## 2022-01-29 RX ADMIN — CLOPIDOGREL BISULFATE 75 MILLIGRAM(S): 75 TABLET, FILM COATED ORAL at 11:57

## 2022-01-29 RX ADMIN — Medication 85 MILLIMOLE(S): at 18:05

## 2022-01-29 NOTE — PROGRESS NOTE ADULT - SUBJECTIVE AND OBJECTIVE BOX
TRANSFER FROM [facility] TO [address] [place] [address] COURSE:     OVERNIGHT EVENTS: No acute events overnight. Placed on 2L NC, but taken off this morning. No respiratory distress.    SUBJECTIVE / INTERVAL HPI: Patient seen and examined at bedside.     Remaining ROS negative     PHYSICAL EXAM:  GENERAL: In NAD. Lying in bed comfortably. Off NC. NG tube in place  HEENT: NC/AT. PERRL. EOMI. NG tube in place  CV: Regular rate, irregular rhythm.  Lungs: CTAB  ABD: Soft, NT, ND.  EXT: WWP. No edema in b/l extremities  NEURO: AAOx0. Moves all 4 extremities spontaneously.     VITAL SIGNS:  Vital Signs Last 24 Hrs  T(C): 36.6 (29 Jan 2022 09:02), Max: 36.6 (29 Jan 2022 09:02)  T(F): 97.9 (29 Jan 2022 09:02), Max: 97.9 (29 Jan 2022 09:02)  HR: 62 (29 Jan 2022 12:00) (53 - 69)  BP: 116/57 (29 Jan 2022 12:00) (110/53 - 159/74)  BP(mean): 80 (29 Jan 2022 12:00) (77 - 106)  RR: 19 (29 Jan 2022 12:00) (15 - 20)  SpO2: 95% (29 Jan 2022 12:00) (93% - 99%)    MEDICATIONS:  MEDICATIONS  (STANDING):  aspirin  chewable 81 milliGRAM(s) Oral every 24 hours  atorvastatin 80 milliGRAM(s) Oral at bedtime  cholecalciferol 2000 Unit(s) Oral daily  enoxaparin Injectable 40 milliGRAM(s) SubCutaneous every 24 hours  magnesium sulfate  IVPB 2 Gram(s) IV Intermittent once  sodium phosphate IVPB 30 milliMole(s) IV Intermittent once    ALLERGIES:  No Known Allergies    LABS:                        12.0   10.70 )-----------( 267      ( 29 Jan 2022 13:21 )             36.7     01-29    137  |  101  |  19  ----------------------------<  153<H>  4.0   |  28  |  0.50    Ca    8.2<L>      29 Jan 2022 13:21  Phos  1.7     01-29  Mg     1.6     01-29    CAPILLARY BLOOD GLUCOSE  POCT Blood Glucose.: 148 mg/dL (29 Jan 2022 11:59)    RADIOLOGY & ADDITIONAL TESTS: Reviewed. TRANSFER FROM [facility] TO [address] [place] [address] COURSE: 81y Female with PMHx of CHF (reduced EF), HTN, hx of STEMI in the past, dementia, COVID in 2020 and recently with COVID 13 days ago (unclear vaccination status) who presented to the ED brought in from [facility] due to altered mental status and hypokalemia. CT/MRI done which showed early subacute ischemia left parietal lobe with few punctate foci of subacute ischemia high posterior left frontal lobe. Patient with dysphagia requiring NG tube for tube feeds. GOC discussion with family ongoing. Currently full code.    OVERNIGHT EVENTS: No acute events overnight. Placed on 2L NC, but taken off this morning. No respiratory distress.    SUBJECTIVE / INTERVAL HPI: Patient seen and examined at bedside.     Remaining ROS negative     PHYSICAL EXAM:  GENERAL: In NAD. Lying in bed comfortably. Off NC. NG tube in place  HEENT: NC/AT. PERRL. EOMI. NG tube in place  CV: Regular rate, irregular rhythm.  Lungs: CTAB  ABD: Soft, NT, ND.  EXT: WWP. No edema in b/l extremities  NEURO: AAOx0. Moves all 4 extremities spontaneously.     VITAL SIGNS:  Vital Signs Last 24 Hrs  T(C): 36.6 (29 Jan 2022 09:02), Max: 36.6 (29 Jan 2022 09:02)  T(F): 97.9 (29 Jan 2022 09:02), Max: 97.9 (29 Jan 2022 09:02)  HR: 62 (29 Jan 2022 12:00) (53 - 69)  BP: 116/57 (29 Jan 2022 12:00) (110/53 - 159/74)  BP(mean): 80 (29 Jan 2022 12:00) (77 - 106)  RR: 19 (29 Jan 2022 12:00) (15 - 20)  SpO2: 95% (29 Jan 2022 12:00) (93% - 99%)    MEDICATIONS:  MEDICATIONS  (STANDING):  aspirin  chewable 81 milliGRAM(s) Oral every 24 hours  atorvastatin 80 milliGRAM(s) Oral at bedtime  cholecalciferol 2000 Unit(s) Oral daily  enoxaparin Injectable 40 milliGRAM(s) SubCutaneous every 24 hours  magnesium sulfate  IVPB 2 Gram(s) IV Intermittent once  sodium phosphate IVPB 30 milliMole(s) IV Intermittent once    ALLERGIES:  No Known Allergies    LABS:                        12.0   10.70 )-----------( 267      ( 29 Jan 2022 13:21 )             36.7     01-29    137  |  101  |  19  ----------------------------<  153<H>  4.0   |  28  |  0.50    Ca    8.2<L>      29 Jan 2022 13:21  Phos  1.7     01-29  Mg     1.6     01-29    CAPILLARY BLOOD GLUCOSE  POCT Blood Glucose.: 148 mg/dL (29 Jan 2022 11:59)    RADIOLOGY & ADDITIONAL TESTS: Reviewed. TRANSFER FROM [facility] TO [address] [place] [address] COURSE: 81y Female with PMHx of CHF (reduced EF), HTN, hx of STEMI in the past, dementia, COVID in 2020 and recently with COVID 13 days ago (unclear vaccination status) who presented to the ED brought in from [facility] due to altered mental status and hypokalemia. CT/MRI done which showed early subacute ischemia left parietal lobe with few punctate foci of subacute ischemia high posterior left frontal lobe. Patient with dysphagia requiring NG tube for tube feeds. GOC discussion with family ongoing. Currently full code.    OVERNIGHT EVENTS: No acute events overnight. Placed on 2L NC, but taken off this morning. No respiratory distress.    SUBJECTIVE / INTERVAL HPI: Patient seen and examined at bedside.     Remaining ROS negative     PHYSICAL EXAM:  GENERAL: In NAD. Lying in bed comfortably. Off NC. NG tube in place  HEENT: NC/AT. PERRL. EOMI. NG tube in place  CV: Regular rate, irregular rhythm.  Lungs: CTAB  ABD: Soft, NT, ND.  EXT: WWP. No edema in b/l extremities  NEURO: AAOx0. R side flaccid paralysis.     VITAL SIGNS:  Vital Signs Last 24 Hrs  T(C): 36.6 (29 Jan 2022 09:02), Max: 36.6 (29 Jan 2022 09:02)  T(F): 97.9 (29 Jan 2022 09:02), Max: 97.9 (29 Jan 2022 09:02)  HR: 62 (29 Jan 2022 12:00) (53 - 69)  BP: 116/57 (29 Jan 2022 12:00) (110/53 - 159/74)  BP(mean): 80 (29 Jan 2022 12:00) (77 - 106)  RR: 19 (29 Jan 2022 12:00) (15 - 20)  SpO2: 95% (29 Jan 2022 12:00) (93% - 99%)    MEDICATIONS:  MEDICATIONS  (STANDING):  aspirin  chewable 81 milliGRAM(s) Oral every 24 hours  atorvastatin 80 milliGRAM(s) Oral at bedtime  cholecalciferol 2000 Unit(s) Oral daily  enoxaparin Injectable 40 milliGRAM(s) SubCutaneous every 24 hours  magnesium sulfate  IVPB 2 Gram(s) IV Intermittent once  sodium phosphate IVPB 30 milliMole(s) IV Intermittent once    ALLERGIES:  No Known Allergies    LABS:                        12.0   10.70 )-----------( 267      ( 29 Jan 2022 13:21 )             36.7     01-29    137  |  101  |  19  ----------------------------<  153<H>  4.0   |  28  |  0.50    Ca    8.2<L>      29 Jan 2022 13:21  Phos  1.7     01-29  Mg     1.6     01-29    CAPILLARY BLOOD GLUCOSE  POCT Blood Glucose.: 148 mg/dL (29 Jan 2022 11:59)    RADIOLOGY & ADDITIONAL TESTS: Reviewed.

## 2022-01-29 NOTE — PROGRESS NOTE ADULT - SUBJECTIVE AND OBJECTIVE BOX
INTERVAL HPI/OVERNIGHT EVENTS: No acute events overnight. Placed on 2L NC, but taken off this morning. No respiratory distress    VITAL SIGNS:  T(F): 97.9 (01-29-22 @ 09:02)  HR: 53 (01-29-22 @ 08:36)  BP: 159/74 (01-29-22 @ 08:36)  RR: 15 (01-29-22 @ 08:36)  SpO2: 99% (01-29-22 @ 08:36)  Wt(kg): --    PHYSICAL EXAM:  GENERAL: In NAD. Lying in bed comfortably. Off NC. NG tube in place  HEENT: NC/AT. PERRL. EOMI. NG tube in place  CV: Regular rate, irregular rhythm.  Lungs: CTAB  ABD: Soft, NT, ND.  EXT: WWP. No edema in b/l extremities  NEURO: AAOx0. Moves all 4 extremities spontaneously.     MEDICATIONS  (STANDING):  aspirin  chewable 81 milliGRAM(s) Oral every 24 hours  atorvastatin 80 milliGRAM(s) Oral at bedtime  cholecalciferol 2000 Unit(s) Oral daily  clopidogrel Tablet 75 milliGRAM(s) Oral daily  enoxaparin Injectable 40 milliGRAM(s) SubCutaneous every 24 hours    MEDICATIONS  (PRN):      Allergies    No Known Allergies    Intolerances        LABS:                        13.1   8.57  )-----------( 287      ( 28 Jan 2022 08:22 )             40.7     01-29    137  |  102  |  17  ----------------------------<  153<H>  4.1   |  28  |  0.51    Ca    8.1<L>      29 Jan 2022 00:58  Phos  2.5     01-28  Mg     2.1     01-28            RADIOLOGY & ADDITIONAL TESTS: Reviewed     INTERVAL HPI/OVERNIGHT EVENTS: No acute events overnight. Placed on 2L NC, but taken off this morning. No respiratory distress    VITAL SIGNS:  T(F): 97.9 (01-29-22 @ 09:02)  HR: 53 (01-29-22 @ 08:36)  BP: 159/74 (01-29-22 @ 08:36)  RR: 15 (01-29-22 @ 08:36)  SpO2: 99% (01-29-22 @ 08:36)  Wt(kg): --    PHYSICAL EXAM:  GENERAL: In NAD. Lying in bed comfortably. Off NC. NG tube in place  HEENT: NC/AT. PERRL. EOMI. NG tube in place  CV: Regular rate, irregular rhythm.  Lungs: CTAB  ABD: Soft, NT, ND.  EXT: WWP. No edema in b/l extremities  NEURO: AAOx0. RUE and LE flaccid. Moves left extremity spontaneously, against gravity    MEDICATIONS  (STANDING):  aspirin  chewable 81 milliGRAM(s) Oral every 24 hours  atorvastatin 80 milliGRAM(s) Oral at bedtime  cholecalciferol 2000 Unit(s) Oral daily  clopidogrel Tablet 75 milliGRAM(s) Oral daily  enoxaparin Injectable 40 milliGRAM(s) SubCutaneous every 24 hours    MEDICATIONS  (PRN):      Allergies    No Known Allergies    Intolerances        LABS:                        13.1   8.57  )-----------( 287      ( 28 Jan 2022 08:22 )             40.7     01-29    137  |  102  |  17  ----------------------------<  153<H>  4.1   |  28  |  0.51    Ca    8.1<L>      29 Jan 2022 00:58  Phos  2.5     01-28  Mg     2.1     01-28            RADIOLOGY & ADDITIONAL TESTS: Reviewed     TRANSFER FROM [facility] TO [address] [place] [address] COURSE:  81y Female with PMHx of CHF (reduced EF), HTN, hx of STEMI in the past, dementia, COVID in 2020 and recently with COVID 13 days ago (unclear vaccination status) who presented to the ED brought in from [facility] due to altered mental status and hypokalemia. CT/MRI done which showed early subacute ischemia left parietal lobe with few punctate foci of subacute ischemia high posterior left frontal lobe. Patient with dysphagia requiring NG tube for tube feeds. GOC discussion with family ongoing. Currently full code.    INTERVAL HPI/OVERNIGHT EVENTS: No acute events overnight. Placed on 2L NC, but taken off this morning. No respiratory distress    VITAL SIGNS:  T(F): 97.9 (01-29-22 @ 09:02)  HR: 53 (01-29-22 @ 08:36)  BP: 159/74 (01-29-22 @ 08:36)  RR: 15 (01-29-22 @ 08:36)  SpO2: 99% (01-29-22 @ 08:36)  Wt(kg): --    PHYSICAL EXAM:  GENERAL: In NAD. Lying in bed comfortably. Off NC. NG tube in place  HEENT: NC/AT. PERRL. EOMI. NG tube in place  CV: Regular rate, irregular rhythm.  Lungs: CTAB  ABD: Soft, NT, ND.  EXT: WWP. No edema in b/l extremities  NEURO: AAOx0. RUE and LE flaccid. Moves left extremity spontaneously, against gravity    MEDICATIONS  (STANDING):  aspirin  chewable 81 milliGRAM(s) Oral every 24 hours  atorvastatin 80 milliGRAM(s) Oral at bedtime  cholecalciferol 2000 Unit(s) Oral daily  clopidogrel Tablet 75 milliGRAM(s) Oral daily  enoxaparin Injectable 40 milliGRAM(s) SubCutaneous every 24 hours    MEDICATIONS  (PRN):      Allergies    No Known Allergies    Intolerances        LABS:                        13.1   8.57  )-----------( 287      ( 28 Jan 2022 08:22 )             40.7     01-29    137  |  102  |  17  ----------------------------<  153<H>  4.1   |  28  |  0.51    Ca    8.1<L>      29 Jan 2022 00:58  Phos  2.5     01-28  Mg     2.1     01-28            RADIOLOGY & ADDITIONAL TESTS: Reviewed

## 2022-01-29 NOTE — PROGRESS NOTE ADULT - ASSESSMENT
81y Female with PMHx of CHF (reduced EF), HTN, hx of STEMI in the past, dementia, COVID in 2020 and recently with COVID 13 days ago (unclear vaccination status) who presented to the ED brought in from [facility] due to altered mental status and hypokalemia. CT/MRI done which showed early subacute ischemia left parietal lobe with few punctate foci of subacute ischemia high posterior left frontal lobe. therefore, admitted to stroke team for further workup.     Neuro  #CVA workup  - ASA 81mg via NG tube  - plavix 75mg via NG tube  - atorvastatin 80mg daily  - lovenox sq for DVT prophylaxis  - MRI Brain without contrast early subacute ischemia left parietal lobe with few punctate foci of subacute ischemia high posterior left frontal lobe.  - Stroke Code HCT Results: Area of hypodensity is noted in the left high frontoparietal lobe which is suspicious for acute/subacute   infarction.  - Stroke Code CTA Results: Bilateral pleural effusions, Food debris/secretions in the dependent esophagus compatible with   gastroesophageal reflux, Multinodular thyromegaly.  - obtain collateral from family/rehab  - f/u palliative recs    Cards  - stroke subacute, no need for permissive HTN, may continue with normotension    #CHF   - hold lisinopril & metoprolol for now due to bradycardia   - TTE 1/27 ef 70%, no evidence of R to L shunt    #Bradycardia  - second degree type 1 heart block on tele   - Cards consulted, no signed off, no intervention at this time    #HLD  - high dose statin as above in CVA  - LDL results: 48    Pulm  - call provider if SPO2 < 94%  - consider pulm consult due to bilateral pleural effusions     GI  #Nutrition/Fluids/Electrolytes   - replete K<4 and Mg <2  - pt hypokalemic, continue to replete prn  - repleted Mg  - Diet: NG tube feeds    Infectious Disease  - Stroke Code CXR results:  Left lower lobe consolidation. Consider chest CT for further   evaluation. Cardiomegaly  - recent COVID infection, obtain collateral from rehab, may have received course of abx for PNA     Endocrine  - A1C results: 5.7    - TSH results: .410, thyromegaly on CTA     DVT Prophylaxis  - lovenox sq for DVT prophylaxis   - SCDs for DVT prophylaxis     IDR Goals: Goals reviewed at interdisciplinary rounds with case management, social work, physical therapy, occupational therapy, and speech language pathology.   Please see specific therapy  notes for in depth goals.    Dispo: return to [place], no additional needs per PT/OT     Discussed daily hospital plans and goals with patient and family at bedside. (Called and updated family.)    Discussed with Neurology Attending Dr. Lester 81y Female with PMHx of CHF (reduced EF), HTN, hx of STEMI in the past, dementia, COVID in 2020 and recently with COVID 13 days ago (unclear vaccination status) who presented to the ED brought in from [facility] due to altered mental status and hypokalemia. CT/MRI done which showed early subacute ischemia left parietal lobe with few punctate foci of subacute ischemia high posterior left frontal lobe. therefore, admitted to stroke team for further workup.     Neuro  #CVA workup  - ASA 81mg via NG tube  - stopped plavix today  - atorvastatin 80mg daily  - lovenox sq for DVT prophylaxis  - MRI Brain without contrast early subacute ischemia left parietal lobe with few punctate foci of subacute ischemia high posterior left frontal lobe.  - Stroke Code HCT Results: Area of hypodensity is noted in the left high frontoparietal lobe which is suspicious for acute/subacute   infarction.  - Stroke Code CTA Results: Bilateral pleural effusions, Food debris/secretions in the dependent esophagus compatible with   gastroesophageal reflux, Multinodular thyromegaly.  - obtain collateral from family/rehab  - f/u palliative recs    Cards  - stroke subacute, no need for permissive HTN, may continue with normotension    #CHF   - hold lisinopril & metoprolol for now due to bradycardia   - TTE 1/27 ef 70%, no evidence of R to L shunt    #Bradycardia  - second degree type 1 heart block on tele   - Cards consulted, no signed off, no intervention at this time    #HLD  - high dose statin as above in CVA  - LDL results: 48    Pulm  - call provider if SPO2 < 94%  - consider pulm consult due to bilateral pleural effusions     GI  #Nutrition/Fluids/Electrolytes   - replete K<4 and Mg <2  - pt hypokalemic, continue to replete prn  - repleted Mg  - Diet: NG tube feeds    Infectious Disease  - Stroke Code CXR results:  Left lower lobe consolidation. Consider chest CT for further   evaluation. Cardiomegaly  - recent COVID infection, obtain collateral from rehab, may have received course of abx for PNA     Endocrine  - A1C results: 5.7    - TSH results: .410, thyromegaly on CTA     DVT Prophylaxis  - lovenox sq for DVT prophylaxis   - SCDs for DVT prophylaxis     IDR Goals: Goals reviewed at interdisciplinary rounds with case management, social work, physical therapy, occupational therapy, and speech language pathology.   Please see specific therapy  notes for in depth goals.    Dispo: return to [place], no additional needs per PT/OT     Discussed daily hospital plans and goals with patient and family at bedside. (Called and updated family.)    Discussed with Neurology Attending Dr. Lester

## 2022-01-29 NOTE — PROGRESS NOTE ADULT - ASSESSMENT
81y Female with PMHx of CHF (reduced EF), HTN, hx of STEMI in the past, dementia, COVID in 2020 and recently with COVID 13 days ago (unclear vaccination status) who presented to the ED brought in from [facility] due to altered mental status and hypokalemia. CT/MRI done which showed early subacute ischemia left parietal lobe with few punctate foci of subacute ischemia high posterior left frontal lobe. therefore, admitted to stroke team for further workup.

## 2022-01-29 NOTE — DIETITIAN INITIAL EVALUATION ADULT. - ADD RECOMMEND
Monitor for feeding tolerance, GI distress. If pt becomes more awake/alert, SLP Prior to PO diet. Monitor for GOC- Honor at all times. Monitor Skin, Wts, Pain. RD to remain available for additional nutrition interventions as needed.

## 2022-01-29 NOTE — DIETITIAN INITIAL EVALUATION ADULT. - OTHER INFO
81y Female with PMHx of CHF (reduced EF), HTN, STEMI in the past, dementia, COVID in 2020 and recently with COVID 13 days ago (unclear vaccination status) who presented to the ED brought in from [facility] due to altered mental status and hypokalemia. CT/MRI done which showed early subacute ischemia left parietal lobe with few punctate foci of subacute ischemia high posterior left frontal lobe. Pt therefore, admitted to stroke team for further workup.     Pt seen on 5LA, RN at bedside. Nonverbal. Pt consulted to be seen for TF Recs. Noted pt unable to participate in speech and swallow exam. Team placed NGT as result 1/28 and IVF d/c. Now ordered for Jevity 1.2 40ml/hr x24hrs +150ml water q6hrs via NGT (960ml, 1152kcal, 53gm prot, 775ml free water). Feeds running at goal rate, no issues with feeds at this time per RN. No reports of diarrhea. GI WDL except fecal incontinence per flow sheets. Noted per GOC 1/28 more time needed in regards to wishes surrounding PEG placement. No pain. Iain 12, no edema or pressure ulcers.   Please see RD Recs. Paged Team. 81y Female with PMHx of CHF (reduced EF), HTN, STEMI in the past, dementia, COVID in 2020 and recently with COVID 13 days ago (unclear vaccination status) who presented to the ED brought in from [facility] due to altered mental status and hypokalemia. CT/MRI done which showed early subacute ischemia left parietal lobe with few punctate foci of subacute ischemia high posterior left frontal lobe. Pt therefore, admitted to stroke team for further workup.     Pt seen on 5LA, RN at bedside. Nonverbal. Pt consulted to be seen for TF Recs. Noted pt unable to participate in speech and swallow exam. Team placed NGT as result 1/28 and IVF d/c. Now ordered for Jevity 1.2 40ml/hr x24hrs +150ml water q6hrs via NGT (960ml, 1152kcal, 53gm prot, 775ml free water). Feeds running at goal rate, no issues with feeds at this time per RN. No reports of diarrhea. GI WDL except fecal incontinence per flow sheets. Noted per GO 1/28 more time needed in regards to wishes surrounding PEG placement. No pain. Iain 12, no edema or pressure ulcers.   Please see RD Recs. Recs made with team. Pending order placed.

## 2022-01-29 NOTE — DIETITIAN INITIAL EVALUATION ADULT. - OTHER CALCULATIONS
%BRB=664; current body wt used for energy calculations as pt falls within % IBW; adjusted for age, CVA, NFPE

## 2022-01-29 NOTE — DIETITIAN INITIAL EVALUATION ADULT. - ENTERAL
While indicated consider use of: Jevity 1.2 @42ml/hr, x24hrs provides ~1008ml, 1210kcal, 56gm prot, 813ml water.

## 2022-01-29 NOTE — PROGRESS NOTE ADULT - SUBJECTIVE AND OBJECTIVE BOX
O/N Events: NAEO. was briefly placed on 2L overnight, but was taken off in the morning.   Subjective/ROS:  Patient seen and examined at bedside. ROS limited due to dementia, does not appear to be in distress.     VITALS  Vital Signs Last 24 Hrs  T(C): 36.6 (29 Jan 2022 09:02), Max: 36.6 (29 Jan 2022 09:02)  T(F): 97.9 (29 Jan 2022 09:02), Max: 97.9 (29 Jan 2022 09:02)  HR: 62 (29 Jan 2022 12:00) (53 - 69)  BP: 116/57 (29 Jan 2022 12:00) (110/53 - 159/74)  BP(mean): 80 (29 Jan 2022 12:00) (77 - 106)  RR: 19 (29 Jan 2022 12:00) (15 - 20)  SpO2: 95% (29 Jan 2022 12:00) (93% - 99%)    CAPILLARY BLOOD GLUCOSE      POCT Blood Glucose.: 148 mg/dL (29 Jan 2022 11:59)  POCT Blood Glucose.: 154 mg/dL (29 Jan 2022 06:03)  POCT Blood Glucose.: 123 mg/dL (28 Jan 2022 23:28)  POCT Blood Glucose.: 100 mg/dL (28 Jan 2022 18:12)      PHYSICAL EXAM  General: NAD, comfortable, frail appearing  Head: MMM; PERRL; EOMI; NGT in place   Neck: Supple; no JVD  Respiratory: CTA B/L anteriorly; no wheezes/crackles   Cardiovascular: Regular rhythm/rate; S1/S2   Gastrointestinal: Soft; NTND; normoactive BS  : Primafit in place  Extremities: WWP; no edema/cyanosis  Neurological:  awake and alert but does not follow any commands, left gaze preference, moves and withdraws to noxious stimuli on R side.     MEDICATIONS  (STANDING):  aspirin  chewable 81 milliGRAM(s) Oral every 24 hours  atorvastatin 80 milliGRAM(s) Oral at bedtime  cholecalciferol 2000 Unit(s) Oral daily  enoxaparin Injectable 40 milliGRAM(s) SubCutaneous every 24 hours    MEDICATIONS  (PRN):      No Known Allergies      LABS                        13.1   8.57  )-----------( 287      ( 28 Jan 2022 08:22 )             40.7     01-29    137  |  102  |  17  ----------------------------<  153<H>  4.1   |  28  |  0.51    Ca    8.1<L>      29 Jan 2022 00:58  Phos  2.5     01-28  Mg     2.1     01-28

## 2022-01-30 LAB
ANION GAP SERPL CALC-SCNC: 7 MMOL/L — SIGNIFICANT CHANGE UP (ref 5–17)
BUN SERPL-MCNC: 13 MG/DL — SIGNIFICANT CHANGE UP (ref 7–23)
CALCIUM SERPL-MCNC: 8.2 MG/DL — LOW (ref 8.4–10.5)
CHLORIDE SERPL-SCNC: 98 MMOL/L — SIGNIFICANT CHANGE UP (ref 96–108)
CO2 SERPL-SCNC: 28 MMOL/L — SIGNIFICANT CHANGE UP (ref 22–31)
CREAT SERPL-MCNC: 0.46 MG/DL — LOW (ref 0.5–1.3)
GLUCOSE BLDC GLUCOMTR-MCNC: 107 MG/DL — HIGH (ref 70–99)
GLUCOSE BLDC GLUCOMTR-MCNC: 117 MG/DL — HIGH (ref 70–99)
GLUCOSE BLDC GLUCOMTR-MCNC: 118 MG/DL — HIGH (ref 70–99)
GLUCOSE BLDC GLUCOMTR-MCNC: 133 MG/DL — HIGH (ref 70–99)
GLUCOSE SERPL-MCNC: 122 MG/DL — HIGH (ref 70–99)
HCT VFR BLD CALC: 40 % — SIGNIFICANT CHANGE UP (ref 34.5–45)
HGB BLD-MCNC: 13.4 G/DL — SIGNIFICANT CHANGE UP (ref 11.5–15.5)
MAGNESIUM SERPL-MCNC: 1.9 MG/DL — SIGNIFICANT CHANGE UP (ref 1.6–2.6)
MCHC RBC-ENTMCNC: 29.9 PG — SIGNIFICANT CHANGE UP (ref 27–34)
MCHC RBC-ENTMCNC: 33.5 GM/DL — SIGNIFICANT CHANGE UP (ref 32–36)
MCV RBC AUTO: 89.3 FL — SIGNIFICANT CHANGE UP (ref 80–100)
NRBC # BLD: 0 /100 WBCS — SIGNIFICANT CHANGE UP (ref 0–0)
PHOSPHATE SERPL-MCNC: 4.4 MG/DL — SIGNIFICANT CHANGE UP (ref 2.5–4.5)
PLATELET # BLD AUTO: 244 K/UL — SIGNIFICANT CHANGE UP (ref 150–400)
POTASSIUM SERPL-MCNC: 3.8 MMOL/L — SIGNIFICANT CHANGE UP (ref 3.5–5.3)
POTASSIUM SERPL-SCNC: 3.8 MMOL/L — SIGNIFICANT CHANGE UP (ref 3.5–5.3)
RBC # BLD: 4.48 M/UL — SIGNIFICANT CHANGE UP (ref 3.8–5.2)
RBC # FLD: 12.9 % — SIGNIFICANT CHANGE UP (ref 10.3–14.5)
SODIUM SERPL-SCNC: 133 MMOL/L — LOW (ref 135–145)
WBC # BLD: 10.37 K/UL — SIGNIFICANT CHANGE UP (ref 3.8–10.5)
WBC # FLD AUTO: 10.37 K/UL — SIGNIFICANT CHANGE UP (ref 3.8–10.5)

## 2022-01-30 RX ORDER — POTASSIUM CHLORIDE 20 MEQ
20 PACKET (EA) ORAL ONCE
Refills: 0 | Status: COMPLETED | OUTPATIENT
Start: 2022-01-30 | End: 2022-01-30

## 2022-01-30 RX ADMIN — Medication 81 MILLIGRAM(S): at 12:11

## 2022-01-30 RX ADMIN — ATORVASTATIN CALCIUM 80 MILLIGRAM(S): 80 TABLET, FILM COATED ORAL at 22:02

## 2022-01-30 RX ADMIN — Medication 2000 UNIT(S): at 12:11

## 2022-01-30 RX ADMIN — Medication 20 MILLIEQUIVALENT(S): at 12:11

## 2022-01-30 RX ADMIN — ENOXAPARIN SODIUM 40 MILLIGRAM(S): 100 INJECTION SUBCUTANEOUS at 12:11

## 2022-01-31 ENCOUNTER — TRANSCRIPTION ENCOUNTER (OUTPATIENT)
Age: 82
End: 2022-01-31

## 2022-01-31 DIAGNOSIS — R06.00 DYSPNEA, UNSPECIFIED: ICD-10-CM

## 2022-01-31 DIAGNOSIS — K11.7 DISTURBANCES OF SALIVARY SECRETION: ICD-10-CM

## 2022-01-31 DIAGNOSIS — G93.49 OTHER ENCEPHALOPATHY: ICD-10-CM

## 2022-01-31 DIAGNOSIS — R53.2 FUNCTIONAL QUADRIPLEGIA: ICD-10-CM

## 2022-01-31 DIAGNOSIS — R13.12 DYSPHAGIA, OROPHARYNGEAL PHASE: ICD-10-CM

## 2022-01-31 DIAGNOSIS — Z51.5 ENCOUNTER FOR PALLIATIVE CARE: ICD-10-CM

## 2022-01-31 DIAGNOSIS — Z71.89 OTHER SPECIFIED COUNSELING: ICD-10-CM

## 2022-01-31 DIAGNOSIS — R13.10 DYSPHAGIA, UNSPECIFIED: ICD-10-CM

## 2022-01-31 LAB
ANION GAP SERPL CALC-SCNC: 5 MMOL/L — SIGNIFICANT CHANGE UP (ref 5–17)
BUN SERPL-MCNC: 14 MG/DL — SIGNIFICANT CHANGE UP (ref 7–23)
CALCIUM SERPL-MCNC: 8.6 MG/DL — SIGNIFICANT CHANGE UP (ref 8.4–10.5)
CHLORIDE SERPL-SCNC: 96 MMOL/L — SIGNIFICANT CHANGE UP (ref 96–108)
CO2 SERPL-SCNC: 32 MMOL/L — HIGH (ref 22–31)
CREAT SERPL-MCNC: 0.45 MG/DL — LOW (ref 0.5–1.3)
CULTURE RESULTS: SIGNIFICANT CHANGE UP
CULTURE RESULTS: SIGNIFICANT CHANGE UP
GLUCOSE BLDC GLUCOMTR-MCNC: 112 MG/DL — HIGH (ref 70–99)
GLUCOSE BLDC GLUCOMTR-MCNC: 115 MG/DL — HIGH (ref 70–99)
GLUCOSE BLDC GLUCOMTR-MCNC: 129 MG/DL — HIGH (ref 70–99)
GLUCOSE SERPL-MCNC: 109 MG/DL — HIGH (ref 70–99)
HCT VFR BLD CALC: 39.3 % — SIGNIFICANT CHANGE UP (ref 34.5–45)
HGB BLD-MCNC: 13 G/DL — SIGNIFICANT CHANGE UP (ref 11.5–15.5)
MAGNESIUM SERPL-MCNC: 1.7 MG/DL — SIGNIFICANT CHANGE UP (ref 1.6–2.6)
MCHC RBC-ENTMCNC: 29.6 PG — SIGNIFICANT CHANGE UP (ref 27–34)
MCHC RBC-ENTMCNC: 33.1 GM/DL — SIGNIFICANT CHANGE UP (ref 32–36)
MCV RBC AUTO: 89.5 FL — SIGNIFICANT CHANGE UP (ref 80–100)
NRBC # BLD: 0 /100 WBCS — SIGNIFICANT CHANGE UP (ref 0–0)
PHOSPHATE SERPL-MCNC: 2.8 MG/DL — SIGNIFICANT CHANGE UP (ref 2.5–4.5)
PLATELET # BLD AUTO: 231 K/UL — SIGNIFICANT CHANGE UP (ref 150–400)
POTASSIUM SERPL-MCNC: 4.2 MMOL/L — SIGNIFICANT CHANGE UP (ref 3.5–5.3)
POTASSIUM SERPL-SCNC: 4.2 MMOL/L — SIGNIFICANT CHANGE UP (ref 3.5–5.3)
RBC # BLD: 4.39 M/UL — SIGNIFICANT CHANGE UP (ref 3.8–5.2)
RBC # FLD: 13 % — SIGNIFICANT CHANGE UP (ref 10.3–14.5)
SODIUM SERPL-SCNC: 133 MMOL/L — LOW (ref 135–145)
SPECIMEN SOURCE: SIGNIFICANT CHANGE UP
SPECIMEN SOURCE: SIGNIFICANT CHANGE UP
WBC # BLD: 10.12 K/UL — SIGNIFICANT CHANGE UP (ref 3.8–10.5)
WBC # FLD AUTO: 10.12 K/UL — SIGNIFICANT CHANGE UP (ref 3.8–10.5)

## 2022-01-31 PROCEDURE — 99233 SBSQ HOSP IP/OBS HIGH 50: CPT

## 2022-01-31 PROCEDURE — 99358 PROLONG SERVICE W/O CONTACT: CPT | Mod: NC

## 2022-01-31 PROCEDURE — 99231 SBSQ HOSP IP/OBS SF/LOW 25: CPT

## 2022-01-31 PROCEDURE — 99497 ADVNCD CARE PLAN 30 MIN: CPT | Mod: 25

## 2022-01-31 PROCEDURE — 99223 1ST HOSP IP/OBS HIGH 75: CPT

## 2022-01-31 RX ADMIN — ENOXAPARIN SODIUM 40 MILLIGRAM(S): 100 INJECTION SUBCUTANEOUS at 11:53

## 2022-01-31 RX ADMIN — Medication 81 MILLIGRAM(S): at 11:53

## 2022-01-31 RX ADMIN — ATORVASTATIN CALCIUM 80 MILLIGRAM(S): 80 TABLET, FILM COATED ORAL at 22:42

## 2022-01-31 RX ADMIN — Medication 2000 UNIT(S): at 11:53

## 2022-01-31 NOTE — CONSULT NOTE ADULT - ASSESSMENT
81y F with PMHx of CHF (reduced EF), HTN, hx of STEMI, dementia, COVID (2020 and recently 13 days ago) who was brought in from [facility] on 1/26 due to altered mental status and hypokalemia. CT/MRI cw early subacute ischemia left parietal lobe with few punctate foci of subacute ischemia high posterior left frontal lobe. Total care, orientedx0. Palliative care consulted for GOC.

## 2022-01-31 NOTE — CONSULT NOTE ADULT - PROBLEM SELECTOR RECOMMENDATION 6
.  presented from NH with AMS. -MRI Brain without contrast early subacute ischemia left parietal lobe with few punctate foci of subacute ischemia high posterior left frontal lobe.  - appreciate Neuro recs  - cw lovenox sq through discharge .  long term resident at [facility] prior to admission. pt is sp stroke, now bedbound, incont of B and B, oropharyngeal dysphagia and PEG not within GOC. prognosis likely weeks without adequate nutrition  - appreciate palliative VALERIE Santamaria assistance with discharge to NH and resuming hospice

## 2022-01-31 NOTE — CONSULT NOTE ADULT - PROBLEM SELECTOR RECOMMENDATION 9
.  comfortable on RA. comfort care.  - start dilaudid 0.25 mg IV q3 PRN RR 22-26 or mild pain  - start dilaudid 0.5 mg IV q3 PRN RR>26 or moderate pain  - low threshold to repeat dose or increase PRN frequency to maximize comfort at EOL  - cw oxygen via NC if akbar by patient .  comfortable on RA. using oxygen via NC intermittently. now comfort care.  - start dilaudid 0.25 mg IV q3 PRN RR 22-26 or mild pain  - start dilaudid 0.5 mg IV q3 PRN RR>26 or moderate pain  - low threshold to repeat dose or increase PRN frequency to maximize comfort at EOL  - cw oxygen via NC if akbar by patient

## 2022-01-31 NOTE — CONSULT NOTE ADULT - PROBLEM SELECTOR RECOMMENDATION 5
.  sp stroke iso dementia. oriented x0. unable to verbalize needs or follow instructions. comfort care.   - given haldol 0.5 mg IV q8 PRN agitation   - Risk for delirium, recommend frequent reorientation. Avoid benzos, sedating and anticholinergic medications unless they contribute to comfort .  sp stroke iso dementia. oriented x0. unable to verbalize needs or follow instructions. episodes of hyper/hypoactive delirium per bedside nursing. now comfort care.   - given haldol 0.5 mg IV q8 PRN agitation .  presented from NH with AMS. -MRI Brain without contrast early subacute ischemia left parietal lobe with few punctate foci of subacute ischemia high posterior left frontal lobe.  - appreciate Neuro recs  - cw lovenox sq through discharge .  presented from NH with AMS. MRI Brain without contrast early subacute ischemia left parietal lobe with few punctate foci of subacute ischemia high posterior left frontal lobe.  - appreciate Neuro recs  - cw lovenox sq through discharge

## 2022-01-31 NOTE — PROGRESS NOTE ADULT - SUBJECTIVE AND OBJECTIVE BOX
Patient is a 81y old  Female who presents with a chief complaint of AMS, stroke on CT/MRI (31 Jan 2022 13:08)      INTERVAL HPI/OVERNIGHT EVENTS:    Pt. seen and examined earlier today  Pt. non-verbal, no complaints elicited; ROS limited due to advanced dementia    Review of Systems: 12 point review of systems otherwise negative    MEDICATIONS  (STANDING):  aspirin  chewable 81 milliGRAM(s) Oral every 24 hours  atorvastatin 80 milliGRAM(s) Oral at bedtime  cholecalciferol 2000 Unit(s) Oral daily  enoxaparin Injectable 40 milliGRAM(s) SubCutaneous every 24 hours    MEDICATIONS  (PRN):      Allergies    No Known Allergies    Intolerances          Vital Signs Last 24 Hrs  T(C): 36.3 (31 Jan 2022 12:05), Max: 36.5 (30 Jan 2022 21:10)  T(F): 97.4 (31 Jan 2022 12:05), Max: 97.7 (30 Jan 2022 21:10)  HR: 59 (31 Jan 2022 12:05) (56 - 72)  BP: 129/72 (31 Jan 2022 12:05) (129/72 - 142/83)  BP(mean): --  RR: 19 (31 Jan 2022 12:05) (18 - 19)  SpO2: 96% (31 Jan 2022 12:05) (95% - 96%)  CAPILLARY BLOOD GLUCOSE      POCT Blood Glucose.: 129 mg/dL (31 Jan 2022 12:02)  POCT Blood Glucose.: 112 mg/dL (31 Jan 2022 06:12)  POCT Blood Glucose.: 107 mg/dL (30 Jan 2022 21:16)  POCT Blood Glucose.: 117 mg/dL (30 Jan 2022 17:10)      01-30 @ 07:01  -  01-31 @ 07:00  --------------------------------------------------------  IN: 486 mL / OUT: 400 mL / NET: 86 mL        Physical Exam:  (earlier today)  Daily     Daily   General:  comfortable-appearing in NAD  HEENT:  +NGT  CV:  RRR, no JVD  Lungs:  CTA B/L anteriorly; normal WOB on RA  Abdomen:  soft NT ND  Extremities:  no edema B/L LE  Skin:  WWP  :  +PrimaFit  Neuro:  AAOx0, doesn't follow commands  +B/L wrist restraints    LABS:                        13.0   10.12 )-----------( 231      ( 31 Jan 2022 06:39 )             39.3     01-31    133<L>  |  96  |  14  ----------------------------<  109<H>  4.2   |  32<H>  |  0.45<L>    Ca    8.6      31 Jan 2022 06:39  Phos  2.8     01-31  Mg     1.7     01-31

## 2022-01-31 NOTE — CONSULT NOTE ADULT - PROBLEM SELECTOR RECOMMENDATION 2
assist w/ ADLs; suggest Palliative Care consult
.  expect to increase as pt progresses through dying process.   - recommend glyco 0.2 mg IV q6 PRN secretions   - do not recommend scop patch at this time

## 2022-01-31 NOTE — CONSULT NOTE ADULT - SUBJECTIVE AND OBJECTIVE BOX
HPI:   **STROKE HPI***  (All info obtained from chart, unable to contact family or doctor for collateral)    HPI: 81y Female with PMHx of CHF (reduced EF), HTN, hx of STEMI in the past, dementia, COVID in 2020 and recently with COVID 13 days ago (unclear vaccination status) who presented to the ED brought in from [facility] due to altered mental status and hypokalemia. CT/MRI done which showed subacute left frontoparietal infarct, therefore, admitted to stroke team for further workup.   Unable to obtain ROS from patient, as patient A&Ox3, not following commands and unintelligible speech. Attempted to reach family, however, no response.   As per info obtained from the ED, the patient is normally A&O x3, however, has been increasingly lethargic and confused, which was initially attributed to COVID.     T(C): 36.1 (01-27-22 @ 00:48), Max: 36.9 (01-26-22 @ 18:41)  HR: 52 (01-27-22 @ 04:00) (52 - 64)  BP: 134/60 (01-27-22 @ 04:00) (134/60 - 189/79)  RR: 13 (01-27-22 @ 04:00) (13 - 18)  SpO2: 94% (01-27-22 @ 04:00) (92% - 99%)    Medications taking at Rehab facility:   aspirin 81mg daily  atorvastatin 20mg daily   cholecalfierol 1000 unit 2 tab daily   dronabinal 2.5 daily for anorexia   Lovenox 40 units subq daily for DVT prophylaxis   lisinipril 2.5 mg daily for HF  metoprolol tartate 12.5 mg two times a day for HF    Vital Signs Last 24 Hrs  T(C): 36.1 (27 Jan 2022 00:48), Max: 36.9 (26 Jan 2022 18:41)  T(F): 97 (27 Jan 2022 00:48), Max: 98.5 (26 Jan 2022 18:41)  HR: 52 (27 Jan 2022 04:00) (52 - 64)  BP: 134/60 (27 Jan 2022 04:00) (134/60 - 189/79)  BP(mean): 87 (27 Jan 2022 04:00) (87 - 106)  RR: 13 (27 Jan 2022 04:00) (13 - 18)  SpO2: 94% (27 Jan 2022 04:00) (92% - 99%)                 (27 Jan 2022 00:39)      PRESENT SYMPTOMS:   [ ]Unable to obtain due to poor mentation   Source if other than patient:  [ ]Family   [ ]Team     Pain:  Location :        Quality:  Radiation:  Timing:  Aggravating factors:  Minimal acceptable level (0-10 scale):   Severity in last 24h (0-10 scale) :  Current score (0-10 scale):  Improves with:     PAIN AD Score:   [email or web address] (press ctrl +  left click to view)    If [ ], pt denies symptom.   Dyspnea:         [ ]Mild [ ]Moderate [ ]Severe  Anxiety:           [ ]Mild [ ]Moderate [ ]Severe  Fatigue:           [ ]Mild [ ]Moderate [ ]Severe  Nausea:           [ ]Mild [ ]Moderate [ ]Severe  Loss of appetite:     [ ]Mild [ ]Moderate [ ]Severe  Constipation:   [ ]Mild [ ]Moderate [ ]Severe  LBM   Grief Present   [ ] Yes   [ ] No   Other Symptoms:    [ ]All other review of systems negative     Opiate Naive (Y/N): Yes  iStop reviewed (Y/N): Yes.   No Rx found on iStop review (Ref#:  [id number]         PAST MEDICAL & SURGICAL HISTORY:  Dementia        FAMILY HISTORY:   Reviewed and found non contributory in mother or father    SOCIAL HISTORY:   - Support system: [ ] strong [ ] adequate [ ] inadequate    PSYCHOSOCIAL ASSESSMENT:  - Presybeterian/Spiritual practice:  - Role of organized Confucianist [ ] important [ ] some [ ] unable to assess dt pt mentation     - Coping: [ ] well [ ] with difficulty [ ] poor coping [ ] unable to assess dt pt mentation  - What is most important to patient?  - What worries patient the most?  - Is patient at peace? :     Allergies    No Known Allergies    Intolerances        Medications:      MEDICATIONS  (STANDING):  aspirin  chewable 81 milliGRAM(s) Oral every 24 hours  atorvastatin 80 milliGRAM(s) Oral at bedtime  cholecalciferol 2000 Unit(s) Oral daily  enoxaparin Injectable 40 milliGRAM(s) SubCutaneous every 24 hours    MEDICATIONS  (PRN):      Labs:    CBC:                        13.0   10.12 )-----------( 231      ( 31 Jan 2022 06:39 )             39.3     CMP:    01-31    133<L>  |  96  |  14  ----------------------------<  109<H>  4.2   |  32<H>  |  0.45<L>    Ca    8.6      31 Jan 2022 06:39  Phos  2.8     01-31  Mg     1.7     01-31             RADIOLOGY & ADDITIONAL STUDIES:  Imaging:  Reviewed    PROTEIN CALORIE MALNUTRITION PRESENT:  [ ] Yes  [ ] No  Albumin, Serum:     [ ] PPSV2 < or = to 30%   [ ] significant weight loss    [ ] poor nutritional intake  [ ] catabolic state   [ ] anasarca       [ ] Artificial Nutrition    PEx:  T(C): 36.3 (01-31-22 @ 12:05), Max: 36.5 (01-30-22 @ 21:10)  HR: 59 (01-31-22 @ 12:05) (56 - 72)  BP: 129/72 (01-31-22 @ 12:05) (129/72 - 142/83)  RR: 19 (01-31-22 @ 12:05) (18 - 19)  SpO2: 96% (01-31-22 @ 12:05) (95% - 96%)  Wt(kg): --    GEN: NAD  HEENT: atraumatic, no temporal wasting, MMM  RESP: Regular rhythm, no accessory muscle use, CTAB, diminished bilat bases  CARD: No tachycardia, regular rhythm, s1/s2  GI: soft, non distended, non tender with light palpation, normoactive BSx4  EXTR: No cyanosis, No edema  NEURO: Alert, oriented x3  PSYCH:     Preadmit Karnofsky:  %             Current Karnofsky:     %    BMI:  Wt:  Cachexia (Y/N):     PALLIATIVE MEDICINE COORDINATION OF CARE DOCUMENTATION: [x] Inpatient Consult  Non-Face-to-Face prolonged service provided that relates to (face-to-face) care that has or will occur and ongoing patient management, including one or more of the following: - Reviewed documentation from other physicians and other health care professional services - Reviewed medical records and diagnostic / radiology study results - Coordination with patient's support system  ************************************************************************  MEDICATION REVIEW:  - See Medication List Above    ISTOP REFERENCE:   - no active Rxs   - PRN usage: NO PRN'S  ------------------------------------------------------------------------  COORDINATION OF CARE:  - Palliative Care consulted for: GOC / Symptom Management  - Patient (to be) assessed:  - Patient previously seen by Palliative Care service: NO    ADVANCE CARE PLANNING  - Code status: FULL  - MOLST reviewed in chart: NONE; None found on Alpha  - HCP/ Surrogate: NONE found on Alpha  - GOC documents: NONE found on Alpha  - HCP/ Living will/Other Advanced Directives in Alpha: NONE found on Alpha  ------------------------------------------------------------------------  CARE PROVIDER DOCUMENTATION:  - VALERIE/TREY notes: Remains medically active  -   -     PLAN OF CARE  - Known admissions to [facility] in past year:  - Current admit date:  - LOS:  - LACE score:   - Current dispo plan: TO BE DETERMINED    01-26-22 (5d)  ------------------------------------------------------------------------  - Time Spent/Chart reviewed: 31 Minutes [including time used to gather, review and transfer data]  - Start: 1:10p  - End: 1:40p    Prolonged services rendered, as part of this patient's care provided by Palliative Medicine, include: i. chart review for provider and ancillary service documentation, ii. pertinent diagnostics including laboratory and imaging studies, iii. medication review including PRN use, iv. admission history including previous palliative care encounters and GOC notes, v. advance care planning documents including HCP and MOLST forms in Alpha. Part of Palliative Medicine extended evaluation and management also involves coordination of care with our IDT, the primary and consulting teams, and unit CM/SW and Hospice if eligible. Recommendations based on the information gathered and discussed are outlined in the A/P of Palliative notes. HPI:   **STROKE HPI***  (All info obtained from chart, unable to contact family or doctor for collateral)    HPI: 81y Female with PMHx of CHF (reduced EF), HTN, hx of STEMI in the past, dementia, COVID in 2020 and recently with COVID 13 days ago (unclear vaccination status) who was brought in to ED from [facility] on 1/26 due to altered mental status and hypokalemia.     CT/MRI done which showed subacute left frontoparietal infarct, therefore, admitted to stroke team for further workup.   Unable to obtain ROS from patient, as patient A&Ox3, not following commands and unintelligible speech. Attempted to reach family, however, no response.   As per info obtained from the ED, the patient is normally A&O x3, however, has been increasingly lethargic and confused, which was initially attributed to COVID.     Palliative care consulted for GOC. Pt seen and examined, non verbal, unable to respond to questions.  Appears comfortable.  Per bedside RN, pt with hypo/hyperactive episodes of delirium. bedbound, incontinent of BB, total care. No PRNs administered in the last 24hours. pt refusing to let pt near mouth for oral care.     Pt is long term resident at [facility]. Pt is  and without immediate family members.  Pt does not have HCP. Her step daughter, Lizet, is point of contact here at [facility] and also at [facility]; feels comfortable making health care decisions.     PRESENT SYMPTOMS:   [ ]Unable to obtain due to poor mentation   Source if other than patient:  [ ]Family   [x ]Team     Pain: none observed     PAIN AD Score:   [email or web address] (press ctrl +  left click to view)    If [ ], pt denies symptom.   Dyspnea:         [ ]Mild [ ]Moderate [ ]Severe  Anxiety:           [ ]Mild [ ]Moderate [ ]Severe  Fatigue:           [ ]Mild [x ]Moderate [ ]Severe  Nausea:           [ ]Mild [ ]Moderate [ ]Severe  Loss of appetite:     [ ]Mild [ ]Moderate [ ]Severe  Constipation:   [ ]Mild [ ]Moderate [ ]Severe  Grief Present   [ ] Yes   [ ] No   Other Symptoms:    [ ]All other review of systems negative     Opiate Naive (Y/N): Yes  iStop reviewed (Y/N): Yes.   No Rx found on iStop review (Ref#:  [id number]         PAST MEDICAL & SURGICAL HISTORY:  Dementia    FAMILY HISTORY:   Reviewed and found non contributory in mother or father    SOCIAL HISTORY:   - Support system: [ ] strong [ ] adequate [x ] inadequate  - long term resident at [facility]  -  and without immediate family members    PSYCHOSOCIAL ASSESSMENT:  - Mormonism/Spiritual practice:  - Role of organized Mu-ism [ ] important [ ] some [ x] unable to assess dt pt mentation     - Coping: [ ] well [ ] with difficulty [ ] poor coping [x ] unable to assess dt pt mentation      Allergies  No Known Allergies  Intolerances    Medications:      MEDICATIONS  (STANDING):  aspirin  chewable 81 milliGRAM(s) Oral every 24 hours  atorvastatin 80 milliGRAM(s) Oral at bedtime  cholecalciferol 2000 Unit(s) Oral daily  enoxaparin Injectable 40 milliGRAM(s) SubCutaneous every 24 hours    MEDICATIONS  (PRN):    Labs:    CBC:                        13.0   10.12 )-----------( 231      ( 31 Jan 2022 06:39 )             39.3     CMP:    01-31    133<L>  |  96  |  14  ----------------------------<  109<H>  4.2   |  32<H>  |  0.45<L>    Ca    8.6      31 Jan 2022 06:39  Phos  2.8     01-31  Mg     1.7     01-31      RADIOLOGY & ADDITIONAL STUDIES:  Imaging:  Reviewed    < from: CT Head No Cont (01.26.22 @ 20:13) >  IMPRESSION:  Study degraded by streak artifacts. Area of hypodensity is noted in the   left high frontoparietal lobe which is suspicious for acute/subacute   infarction.    No acute intracranial hemorrhage is seen.      < from: MR Head No Cont (01.26.22 @ 23:09) >  IMPRESSION:  Positive early subacute ischemia left parietal lobe with few punctate   foci of subacute ischemia high posterior left frontal lobe.    < from: CT Angio Neck w/ IV Cont (01.26.22 @ 23:59) >  IMPRESSION:  1. Partial opacification of the right maxillary sinus compatible with   sinusitis.  2. No acute vascular findings.    < from: CT Angio Head w/ IV Cont (01.26.22 @ 23:59) >  IMPRESSION:  1. Partial opacification of the right maxillary sinus compatible with   sinusitis.  2. No acute vascular findings.    < from: Xray Abdomen 1 View PORTABLE -Urgent (Xray Abdomen 1 View PORTABLE -Urgent .) (01.28.22 @ 19:07) >  IMPRESSION: Dobbhoff feeding tube noted as described    < from: Xray Chest 1 View- PORTABLE-Urgent (Xray Chest 1 View- PORTABLE-Urgent .) (01.28.22 @ 18:29) >  Impression: Nasogastric tube tip is excluded from the study but courses   towards the stomach and is likely in satisfactory position.    PROTEIN CALORIE MALNUTRITION PRESENT:  [x ] Yes  [ ] No  Albumin, Serum:  2.9 (1/26)    [ ] PPSV2 < or = to 30%   [ ] significant weight loss    [ ] poor nutritional intake  [ ] catabolic state   [ ] anasarca       [x ] Artificial Nutrition    PEx:  T(C): 36.3 (01-31-22 @ 12:05), Max: 36.5 (01-30-22 @ 21:10)  HR: 59 (01-31-22 @ 12:05) (56 - 72)  BP: 129/72 (01-31-22 @ 12:05) (129/72 - 142/83)  RR: 19 (01-31-22 @ 12:05) (18 - 19)  SpO2: 96% (01-31-22 @ 12:05) (95% - 96%)  Wt(kg): --    GEN:  thin elderly woman lying in bed supine, NAD  HEENT: atraumatic, +temporal wasting, MMM, poor dentition   RESP: Regular rhythm, no accessory muscle use, CTAB, diminished bilat bases, fair effort   CARD: No tachycardia, regular rhythm, s1/s2  GI: soft, non distended, non tender with light palpation, normoactive BSx4  EXTR: No cyanosis, No edema  NEURO: lethargic, intermittently opens eyes when called, non verbal   PSYCH: no agitation     Preadmit Karnofsky:  40%             Current Karnofsky:     30%    BMI: 21  Wt: 47  Cachexia (Y/N): Y    PALLIATIVE MEDICINE COORDINATION OF CARE DOCUMENTATION: [x] Inpatient Consult  Non-Face-to-Face prolonged service provided that relates to (face-to-face) care that has or will occur and ongoing patient management, including one or more of the following: - Reviewed documentation from other physicians and other health care professional services - Reviewed medical records and diagnostic / radiology study results - Coordination with patient's support system  ************************************************************************  MEDICATION REVIEW:  - See Medication List Above    ISTOP REFERENCE:   - no active Rxs   - PRN usage: NO PRN'S  ------------------------------------------------------------------------  COORDINATION OF CARE:  - Palliative Care consulted for: GOC / Symptom Management  - Patient (to be) assessed: 1/31  - Patient previously seen by Palliative Care service: NO    ADVANCE CARE PLANNING  - Code status: FULL  - MOLST reviewed in chart: Full code copy in Alpha, pages 1-2   - Surrogate: rosina Hinds   - [facility] documents: 1/28/22  - HCP/ Living will/Other Advanced Directives in Alpha: NONE found on Alpha  ------------------------------------------------------------------------  CARE PROVIDER DOCUMENTATION:  - SW/CM notes: Remains medically active  - Neuro: mgmt post stroke, recommending palliative consult   - Card: consulted for bradycardia, no intervention   - PT: total assist     PLAN OF CARE  - Known admissions to [facility] in past year: current   - Current admit date: 1/26/2022  - LOS: one day   - LACE score: 12  - Current dispo plan: return to NH +/- hospice     01-26-22 (5d)  ------------------------------------------------------------------------  - Time Spent/Chart reviewed: 31 Minutes [including time used to gather, review and transfer data]  - Start: 1:10p  - End: 1:40p    Prolonged services rendered, as part of this patient's care provided by Palliative Medicine, include: i. chart review for provider and ancillary service documentation, ii. pertinent diagnostics including laboratory and imaging studies, iii. medication review including PRN use, iv. admission history including previous palliative care encounters and GOC notes, v. advance care planning documents including HCP and MOLST forms in Alpha. Part of Palliative Medicine extended evaluation and management also involves coordination of care with our IDT, the primary and consulting teams, and unit CM/SW and Hospice if eligible. Recommendations based on the information gathered and discussed are outlined in the A/P of Palliative notes. HPI:   **STROKE HPI***  (All info obtained from chart, unable to contact family or doctor for collateral)    HPI: 81y Female with PMHx of CHF (reduced EF), HTN, hx of STEMI in the past, dementia, COVID in 2020 and recently with COVID 13 days ago (unclear vaccination status) who was brought in to ED from [facility] on 1/26 due to altered mental status and hypokalemia.     CT/MRI done which showed subacute left frontoparietal infarct, therefore, admitted to stroke team for further workup.   Unable to obtain ROS from patient, as patient A&Ox3, not following commands and unintelligible speech. Attempted to reach family, however, no response.   As per info obtained from the ED, the patient is normally A&O x3, however, has been increasingly lethargic and confused, which was initially attributed to COVID.     Palliative care consulted for GOC. Pt seen and examined, non verbal, unable to respond to questions.  Appears comfortable.  Per bedside RN, pt with hypo/hyperactive episodes of delirium. bedbound, incontinent of BB, total care. No PRNs administered in the last 24hours. pt refusing to let pt near mouth for oral care.     Pt is long term resident at [facility]. Pt is  and without immediate family members.  Pt does not have HCP. Her step daughter, Lizet, is point of contact here at [facility] and also at [facility]; feels comfortable making health care decisions.     PRESENT SYMPTOMS:   [ ]Unable to obtain due to poor mentation   Source if other than patient:  [ ]Family   [x ]Team     Pain: none observed     PAIN AD Score:   [email or web address] (press ctrl +  left click to view)    If [ ], pt denies symptom.   Dyspnea:         [ ]Mild [ ]Moderate [ ]Severe  Anxiety:           [ ]Mild [ ]Moderate [ ]Severe  Fatigue:           [ ]Mild [x ]Moderate [ ]Severe  Nausea:           [ ]Mild [ ]Moderate [ ]Severe  Loss of appetite:     [ ]Mild [ ]Moderate [ ]Severe  Constipation:   [ ]Mild [ ]Moderate [ ]Severe  Grief Present   [ ] Yes   [ ] No   Other Symptoms:    [ ]All other review of systems negative     Opiate Naive (Y/N): Yes  iStop reviewed (Y/N): Yes.   No Rx found on iStop review (Ref#:  [id number]         PAST MEDICAL & SURGICAL HISTORY:  Dementia    FAMILY HISTORY:   Reviewed and found non contributory in mother or father    SOCIAL HISTORY:   - Support system: [ ] strong [ ] adequate [x ] inadequate  - long term resident at [facility]  -  and without immediate family members    PSYCHOSOCIAL ASSESSMENT:  - Confucianist/Spiritual practice:  - Role of organized Christianity [ ] important [ ] some [ x] unable to assess dt pt mentation     - Coping: [ ] well [ ] with difficulty [ ] poor coping [x ] unable to assess dt pt mentation      Allergies  No Known Allergies  Intolerances    Medications:      MEDICATIONS  (STANDING):  aspirin  chewable 81 milliGRAM(s) Oral every 24 hours  atorvastatin 80 milliGRAM(s) Oral at bedtime  cholecalciferol 2000 Unit(s) Oral daily  enoxaparin Injectable 40 milliGRAM(s) SubCutaneous every 24 hours    MEDICATIONS  (PRN):    Labs:    CBC:                        13.0   10.12 )-----------( 231      ( 31 Jan 2022 06:39 )             39.3     CMP:    01-31    133<L>  |  96  |  14  ----------------------------<  109<H>  4.2   |  32<H>  |  0.45<L>    Ca    8.6      31 Jan 2022 06:39  Phos  2.8     01-31  Mg     1.7     01-31    RADIOLOGY & ADDITIONAL STUDIES:  CT Head No Cont (01.26.22 @ 20:13) >  IMPRESSION:  Study degraded by streak artifacts. Area of hypodensity is noted in the   left high frontoparietal lobe which is suspicious for acute/subacute   infarction. No acute intracranial hemorrhage is seen.    < from: MR Head No Cont (01.26.22 @ 23:09) >  IMPRESSION:  Positive early subacute ischemia left parietal lobe with few punctate   foci of subacute ischemia high posterior left frontal lobe.    < from: CT Angio Neck w/ IV Cont (01.26.22 @ 23:59) >  IMPRESSION:  1. Partial opacification of the right maxillary sinus compatible with   sinusitis.  2. No acute vascular findings.    < from: CT Angio Head w/ IV Cont (01.26.22 @ 23:59) >  IMPRESSION:  1. Partial opacification of the right maxillary sinus compatible with   sinusitis.  2. No acute vascular findings.    < from: Xray Abdomen 1 View PORTABLE -Urgent (Xray Abdomen 1 View PORTABLE -Urgent .) (01.28.22 @ 19:07) >  IMPRESSION: Dobbhoff feeding tube noted as described    < from: Xray Chest 1 View- PORTABLE-Urgent (Xray Chest 1 View- PORTABLE-Urgent .) (01.28.22 @ 18:29) >  Impression: Nasogastric tube tip is excluded from the study but courses   towards the stomach and is likely in satisfactory position.    PROTEIN CALORIE MALNUTRITION PRESENT:  [x ] Yes  [ ] No  Albumin, Serum:  2.9 (1/26)    [ ] PPSV2 < or = to 30%   [ ] significant weight loss    [ ] poor nutritional intake  [ ] catabolic state   [ ] anasarca       [x ] Artificial Nutrition    PEx:  T(C): 36.3 (01-31-22 @ 12:05), Max: 36.5 (01-30-22 @ 21:10)  HR: 59 (01-31-22 @ 12:05) (56 - 72)  BP: 129/72 (01-31-22 @ 12:05) (129/72 - 142/83)  RR: 19 (01-31-22 @ 12:05) (18 - 19)  SpO2: 96% (01-31-22 @ 12:05) (95% - 96%)  Wt(kg): --    GEN:  thin elderly woman lying in bed supine, NAD  HEENT: atraumatic, +temporal wasting, MMM, poor dentition   RESP: Regular rhythm, no accessory muscle use, CTAB, diminished bilat bases, fair effort   CARD: No tachycardia, regular rhythm, s1/s2  GI: soft, non distended, non tender with light palpation, normoactive BSx4  EXTR: No cyanosis, No edema  NEURO: lethargic, intermittently opens eyes when called, non verbal   PSYCH: no agitation     Preadmit Karnofsky:  40%             Current Karnofsky:     30%    BMI: 21  Wt: 47  Cachexia (Y/N): Y    PALLIATIVE MEDICINE COORDINATION OF CARE DOCUMENTATION: [x] Inpatient Consult  Non-Face-to-Face prolonged service provided that relates to (face-to-face) care that has or will occur and ongoing patient management, including one or more of the following: - Reviewed documentation from other physicians and other health care professional services - Reviewed medical records and diagnostic / radiology study results - Coordination with patient's support system  ************************************************************************  MEDICATION REVIEW:  - See Medication List Above    ISTOP REFERENCE:   - no active Rxs   - PRN usage: NO PRN'S  ------------------------------------------------------------------------  COORDINATION OF CARE:  - Palliative Care consulted for: GOC / Symptom Management  - Patient (to be) assessed: 1/31  - Patient previously seen by Palliative Care service: NO    ADVANCE CARE PLANNING  - Code status: FULL  - MOLST reviewed in chart: Full code copy in Alpha, pages 1-2   - Surrogate: rosina Hinds bashir   - [facility] documents: 1/28/22  - HCP/ Living will/Other Advanced Directives in Alpha: NONE found on Alpha  ------------------------------------------------------------------------  CARE PROVIDER DOCUMENTATION:  - SW/CM notes: Remains medically active  - Neuro: mgmt post stroke, recommending palliative consult   - Card: consulted for bradycardia, no intervention   - PT: total assist     PLAN OF CARE  - Known admissions to [facility] in past year: current   - Current admit date: 1/26/2022  - LOS: one day   - LACE score: 12  - Current dispo plan: return to NH +/- hospice     01-26-22 (5d)  ------------------------------------------------------------------------  - Time Spent/Chart reviewed: 31 Minutes [including time used to gather, review and transfer data]  - Start: 1:10p  - End: 1:40p    Prolonged services rendered, as part of this patient's care provided by Palliative Medicine, include: i. chart review for provider and ancillary service documentation, ii. pertinent diagnostics including laboratory and imaging studies, iii. medication review including PRN use, iv. admission history including previous palliative care encounters and GOC notes, v. advance care planning documents including HCP and MOLST forms in Alpha. Part of Palliative Medicine extended evaluation and management also involves coordination of care with our IDT, the primary and consulting teams, and unit CM/SW and Hospice if eligible. Recommendations based on the information gathered and discussed are outlined in the A/P of Palliative notes.

## 2022-01-31 NOTE — PROGRESS NOTE ADULT - SUBJECTIVE AND OBJECTIVE BOX
***Note in progress***    OVERNIGHT EVENTS: NAEO    SUBJECTIVE / INTERVAL HPI: Patient seen and examined at bedside. Patient denying chest pain, SOB, palpitations, cough. Patient denies fever, chills, HA, Dizziness, change in vision/hearing, N/V, abdominal pain, diarrhea, constipation, hematochezia/melena, dysuria, hematuria, new onset weakness/numbness, LE pain and/or swelling.    Remaining ROS negative     PHYSICAL EXAM:  General: NAD, lying in bed comfortably  HEENT: NC/AT; PERRL, anicteric sclera; MMM  Neck: supple  Cardiovascular: +S1/S2, RRR  Respiratory: CTA B/L; no W/R/R  Gastrointestinal: soft, NT/ND; +BSx4  Extremities: WWP; no edema, clubbing or cyanosis  Vascular: 2+ radial, DP/PT pulses B/L  Neurological: AAOx3; no focal deficits  Psychiatric: pleasant mood and affect  Dermatologic: no appreciable wounds or damage to the skin    VITAL SIGNS:  Vital Signs Last 24 Hrs  T(C): 36.3 (31 Jan 2022 06:34), Max: 36.6 (30 Jan 2022 12:30)  T(F): 97.4 (31 Jan 2022 06:34), Max: 97.8 (30 Jan 2022 12:30)  HR: 72 (31 Jan 2022 06:34) (56 - 72)  BP: 141/79 (31 Jan 2022 06:34) (141/79 - 149/85)  BP(mean): --  RR: 18 (31 Jan 2022 06:34) (18 - 18)  SpO2: 96% (31 Jan 2022 06:34) (95% - 97%)    MEDICATIONS:  MEDICATIONS  (STANDING):  aspirin  chewable 81 milliGRAM(s) Oral every 24 hours  atorvastatin 80 milliGRAM(s) Oral at bedtime  cholecalciferol 2000 Unit(s) Oral daily  enoxaparin Injectable 40 milliGRAM(s) SubCutaneous every 24 hours    MEDICATIONS  (PRN):      ALLERGIES:  No Known Allergies    LABS:                        13.0   10.12 )-----------( 231      ( 31 Jan 2022 06:39 )             39.3     01-31    133<L>  |  96  |  14  ----------------------------<  109<H>  4.2   |  32<H>  |  0.45<L>    Ca    8.6      31 Jan 2022 06:39  Phos  2.8     01-31  Mg     1.7     01-31          CAPILLARY BLOOD GLUCOSE      POCT Blood Glucose.: 112 mg/dL (31 Jan 2022 06:12)      RADIOLOGY & ADDITIONAL TESTS: Reviewed. OVERNIGHT EVENTS: NAEO    SUBJECTIVE / INTERVAL HPI: Patient seen and examined at bedside.     Remaining ROS negative     PHYSICAL EXAM:  GENERAL: In NAD. Lying in bed comfortably. Off NC. NG tube in place  HEENT: NC/AT. PERRL. EOMI. NG tube in place  CV: Regular rate, irregular rhythm.  Lungs: CTAB  ABD: Soft, NT, ND.  EXT: WWP. No edema in b/l extremities  NEURO: AAOx0. RUE and LE flaccid. Moves left extremity spontaneously, against gravity    VITAL SIGNS:  Vital Signs Last 24 Hrs  T(C): 36.3 (31 Jan 2022 06:34), Max: 36.6 (30 Jan 2022 12:30)  T(F): 97.4 (31 Jan 2022 06:34), Max: 97.8 (30 Jan 2022 12:30)  HR: 72 (31 Jan 2022 06:34) (56 - 72)  BP: 141/79 (31 Jan 2022 06:34) (141/79 - 149/85)  RR: 18 (31 Jan 2022 06:34) (18 - 18)  SpO2: 96% (31 Jan 2022 06:34) (95% - 97%)    MEDICATIONS:  MEDICATIONS  (STANDING):  aspirin  chewable 81 milliGRAM(s) Oral every 24 hours  atorvastatin 80 milliGRAM(s) Oral at bedtime  cholecalciferol 2000 Unit(s) Oral daily  enoxaparin Injectable 40 milliGRAM(s) SubCutaneous every 24 hours    ALLERGIES:  No Known Allergies    LABS:                        13.0   10.12 )-----------( 231      ( 31 Jan 2022 06:39 )             39.3     01-31    133<L>  |  96  |  14  ----------------------------<  109<H>  4.2   |  32<H>  |  0.45<L>    Ca    8.6      31 Jan 2022 06:39  Phos  2.8     01-31  Mg     1.7     01-31    CAPILLARY BLOOD GLUCOSE  POCT Blood Glucose.: 112 mg/dL (31 Jan 2022 06:12)    RADIOLOGY & ADDITIONAL TESTS: Reviewed.

## 2022-01-31 NOTE — CONSULT NOTE ADULT - CONVERSATION DETAILS
Pts chart reviewed. Pt is , without immediate family members, does not have HCP. Her step daughter, Lizet Lima (#[phone]), is point of contact here at [facility] and also at [facility]; she feels comfortable making health care decisions. Discussed pts functional and cognitives statuses post stroke. Pts chart reviewed. Pt is , without immediate family members, does not have HCP. Her step daughter, Lizet Lima (#[phone]), is point of contact here at [facility] and also at [facility]; she feels comfortable making health care decisions.     Discussed pts functional and cognitives statuses post stroke, specifically pts R side flaccid paralysis and her inability to safely tolerate oral nutrition. Lizet understands that pt is total care and is unable to verbalize needs/follow commands. Reviewed disease trajectory and long term feeding goals. PEG is Not within GOC. Discussed that without adequate nutrition, pt is likley in last weeks of her life. What is most important to Lizet is maximizing  patient's comfort and establishing hospice support when pt returns to NH.     Code status and its risks reviewed. In the event of card/resp arrest, Lizet electing to allow pt a natural death. Verbal consent obtained to complete MOLST.     Total time: 30 minutes

## 2022-01-31 NOTE — PROGRESS NOTE ADULT - ASSESSMENT
81y Female with PMHx of CHF (reduced EF), HTN, hx of STEMI in the past, dementia, COVID in 2020 and recently with COVID 13 days ago (unclear vaccination status) who presented to the ED brought in from [facility] due to altered mental status and hypokalemia. CT/MRI done which showed early subacute ischemia left parietal lobe with few punctate foci of subacute ischemia high posterior left frontal lobe. therefore, admitted to stroke team for further workup.     Neuro  #CVA workup  - ASA 81mg via NG tube  - stopped plavix today  - atorvastatin 80mg daily  - lovenox sq for DVT prophylaxis  - MRI Brain without contrast early subacute ischemia left parietal lobe with few punctate foci of subacute ischemia high posterior left frontal lobe.  - Stroke Code HCT Results: Area of hypodensity is noted in the left high frontoparietal lobe which is suspicious for acute/subacute   infarction.  - Stroke Code CTA Results: Bilateral pleural effusions, Food debris/secretions in the dependent esophagus compatible with   gastroesophageal reflux, Multinodular thyromegaly.  - obtain collateral from family/rehab  - f/u palliative recs    Cards  - stroke subacute, no need for permissive HTN, may continue with normotension    #CHF   - hold lisinopril & metoprolol for now due to bradycardia   - TTE 1/27 ef 70%, no evidence of R to L shunt    #Bradycardia  - second degree type 1 heart block on tele   - Cards consulted, no signed off, no intervention at this time    #HLD  - high dose statin as above in CVA  - LDL results: 48    Pulm  - call provider if SPO2 < 94%  - consider pulm consult due to bilateral pleural effusions     GI  #Nutrition/Fluids/Electrolytes   - replete K<4 and Mg <2  - pt hypokalemic, continue to replete prn  - repleted Mg  - Diet: NG tube feeds    Infectious Disease  - Stroke Code CXR results:  Left lower lobe consolidation. Consider chest CT for further   evaluation. Cardiomegaly  - recent COVID infection, obtain collateral from rehab, may have received course of abx for PNA     Endocrine  - A1C results: 5.7    - TSH results: .410, thyromegaly on CTA     DVT Prophylaxis  - lovenox sq for DVT prophylaxis   - SCDs for DVT prophylaxis     IDR Goals: Goals reviewed at interdisciplinary rounds with case management, social work, physical therapy, occupational therapy, and speech language pathology.   Please see specific therapy  notes for in depth goals.    Dispo: return to [facility], no additional needs per PT/OT     Discussed daily hospital plans and goals with patient and family at bedside. (Called and updated family.)    Discussed with Neurology Attending Dr. Lester 81y Female with PMHx of CHF (reduced EF), HTN, hx of STEMI in the past, dementia, COVID in 2020 and recently with COVID 13 days ago (unclear vaccination status) who presented to the ED brought in from [facility] due to altered mental status and hypokalemia. CT/MRI done which showed early subacute ischemia left parietal lobe with few punctate foci of subacute ischemia high posterior left frontal lobe. therefore, admitted to stroke team for further workup.

## 2022-01-31 NOTE — CONSULT NOTE ADULT - PROBLEM SELECTOR RECOMMENDATION 4
TSH WNL, no need for further inpatient work-up
.  sp stroke. pps 30%  - assist with reposition q2h, oral care, skin care PRN

## 2022-01-31 NOTE — CONSULT NOTE ADULT - PROBLEM SELECTOR RECOMMENDATION 7
.  long term resident at [facility] prior to admission. pt is sp stroke, now bedbound, incont of B and B, oropharyngeal dysphagia and PEG not within GOC. prognosis likely weeks without adequate nutrition  - appreciate palliative VALERIE Santamaria assistance with discharge to NH and resuming hospice .  - discussion as above  - prognosis likely weeks without adequate nutrition sp stroke   - DNR DNI No PEG MOLST completed and placed in physical chart  - comfort care, mews exempt   - No further labs, IVF, or abx   - VS q shift   - dw palliative VALERIE Santamaria  - Dispo: back to NH with hospice support

## 2022-02-01 PROBLEM — Z00.00 ENCOUNTER FOR PREVENTIVE HEALTH EXAMINATION: Status: ACTIVE | Noted: 2022-02-01

## 2022-02-01 PROBLEM — F03.90 UNSPECIFIED DEMENTIA WITHOUT BEHAVIORAL DISTURBANCE: Chronic | Status: ACTIVE | Noted: 2022-01-26

## 2022-02-01 LAB
GLUCOSE BLDC GLUCOMTR-MCNC: 116 MG/DL — HIGH (ref 70–99)
GLUCOSE BLDC GLUCOMTR-MCNC: 127 MG/DL — HIGH (ref 70–99)
GLUCOSE BLDC GLUCOMTR-MCNC: 137 MG/DL — HIGH (ref 70–99)
GLUCOSE BLDC GLUCOMTR-MCNC: 91 MG/DL — SIGNIFICANT CHANGE UP (ref 70–99)
GLUCOSE BLDC GLUCOMTR-MCNC: 99 MG/DL — SIGNIFICANT CHANGE UP (ref 70–99)

## 2022-02-01 PROCEDURE — 99233 SBSQ HOSP IP/OBS HIGH 50: CPT

## 2022-02-01 PROCEDURE — 99231 SBSQ HOSP IP/OBS SF/LOW 25: CPT

## 2022-02-01 RX ORDER — HYDROMORPHONE HYDROCHLORIDE 2 MG/ML
0.25 INJECTION INTRAMUSCULAR; INTRAVENOUS; SUBCUTANEOUS
Qty: 1 | Refills: 0
Start: 2022-02-01 | End: 2022-03-02

## 2022-02-01 RX ORDER — ROBINUL 0.2 MG/ML
0.2 INJECTION INTRAMUSCULAR; INTRAVENOUS
Qty: 1 | Refills: 0
Start: 2022-02-01

## 2022-02-01 RX ORDER — ENOXAPARIN SODIUM 100 MG/ML
30 INJECTION SUBCUTANEOUS
Qty: 1 | Refills: 0
Start: 2022-02-01 | End: 2022-03-02

## 2022-02-01 RX ADMIN — ENOXAPARIN SODIUM 40 MILLIGRAM(S): 100 INJECTION SUBCUTANEOUS at 12:16

## 2022-02-01 RX ADMIN — Medication 2000 UNIT(S): at 12:16

## 2022-02-01 RX ADMIN — Medication 81 MILLIGRAM(S): at 12:16

## 2022-02-01 NOTE — DISCHARGE NOTE PROVIDER - NSDCQMSTROKERISK_NEU_ALL_CORE
High cholesterol/History of a stroke or TIA High blood pressure/Obesity High blood pressure/High cholesterol/History of a stroke or TIA/Obesity

## 2022-02-01 NOTE — DISCHARGE NOTE PROVIDER - NSDCFUADDAPPT_GEN_ALL_CORE_FT
Please bring your Insurance card, Photo ID, Covid-19 vaccination card (if applicable) and Discharge paperwork to the following appointment:    (1) Please follow up with your Neurology Provider, Dr. Suzanna Zaman on behalf of Dr. Dr. Lester at [address] [place] [address], [address] [place] [address] on 04/19/2022 at 2:20pm.    Please note that the office of Dr. Zaman will contact you to schedule an earlier appointment with the Nurse Practitioner for Dr. Lester once available.    Appointment was scheduled by Ms. ALL Cruz, Referral Coordinator.

## 2022-02-01 NOTE — DISCHARGE NOTE PROVIDER - NSDCQMANTICOAGCONTRA_GEN_A_CORE
Patient does not have atrial fibrillation/flutter Other reason... Patient does not have atrial fibrillation/flutter/Other reason...

## 2022-02-01 NOTE — DISCHARGE NOTE PROVIDER - HOSPITAL COURSE
Hospital course:  81y Female with PMHx of CHF (reduced EF), HTN, hx of STEMI in the past, dementia, COVID in 2020 and recently with COVID 13 days ago (unclear vaccination status) who presented to the ED brought in from [facility] due to altered mental status and hypokalemia. CT/MRI done which showed early subacute ischemia left parietal lobe with few punctate foci of subacute ischemia high posterior left frontal lobe. Patient with dysphagia requiring NG tube for tube feeds, however family decided after GOC discussions not to pursue PEG, and made pt DNR/DNI with plan for hospice support at [facility].     During this hospital course, patient had a ischemic strokes located in left parietal and left frontal loves as seen on CT and MRI  The stroke etiology is likely secondary to:  []atrial fibrillation  []small vessel disease from atherosclerotic risk factors  [x]other: consider hypercoagulability from covid, vs other  []etiology workup still in progress    Patient had the following workup done in house:  []head imaging: < from: MR Head No Cont (01.26.22 @ 23:09) >  IMPRESSION:  Evolving acute infarct in the left distal MCA vascular territory   distribution. No hemorrhage.    [x]vessel imaging: < from: CT Angio Head w/ IV Cont (01.26.22 @ 23:59) >  IMPRESSION:    CTA NECK: No vessel narrowing or occlusion in the neck.    CTA HEAD: No evidence of vascular stenosis, occlusion, aneurysm or   vascular malformation.  [x]echo < from: Echocardiogram w/ Bubble and Doppler (01.27.22 @ 13:23) >  CONCLUSIONS:   1. Mild symmetric left ventricular hypertrophy.   2. Normal left and right ventricular size and systolic function.   3. Mildly dilated left atrium.   4. Injection of agitated saline via a peripheral vein reveals no   evidence of a right-to-left shunt.   5. The mitral valve is mildly thickened. Systolic anterior motion of the   mitral valve resulting in an LVOT gradient of 14.00 mmHg and mild LVOT   obstruction. There is moderate to severe posteriorly directed mitral   regurgitation.   6. Aortic sclerosis without significant stenosis.   7. No evidence of pulmonary hypertension.   8. Trivial pericardial effusion.  [x]labs A1c 5.7, LDL 48  []other    Physical exam at discharge: A&Ox0, eyes open, not following commands, non intelligible speech, right hemianopsia, left gaze preference, able to overcome, slight right facial asymmetry, spontaneous movements of the left side to antigravity, grimaces and withdraws to noxious on the right side    NIHSS: 24    New medications on discharge:  Labs to be followed up:  Imaging to be done as outpatient:  Further outpatient workup:   Hospital course:  81y Female with PMHx of CHF (reduced EF), HTN, hx of STEMI in the past, dementia, COVID in 2020 and recently with COVID 13 days ago (unclear vaccination status) who presented to the ED brought in from [facility] due to altered mental status and hypokalemia. CT/MRI done which showed early subacute ischemia left parietal lobe with few punctate foci of subacute ischemia high posterior left frontal lobe. Patient with dysphagia requiring NG tube for tube feeds, however family decided after GOC discussions not to pursue PEG, and made pt DNR/DNI with plan for hospice support at [facility].     During this hospital course, patient had a ischemic strokes located in left parietal and left frontal loves as seen on CT and MRI  The stroke etiology is likely secondary to:  []atrial fibrillation  []small vessel disease from atherosclerotic risk factors  [x]other: consider hypercoagulability from covid, vs other  []etiology workup still in progress    Patient had the following workup done in house:  []head imaging: < from: MR Head No Cont (01.26.22 @ 23:09) >  IMPRESSION:  Evolving acute infarct in the left distal MCA vascular territory   distribution. No hemorrhage.    [x]vessel imaging: < from: CT Angio Head w/ IV Cont (01.26.22 @ 23:59) >  IMPRESSION:    CTA NECK: No vessel narrowing or occlusion in the neck.    CTA HEAD: No evidence of vascular stenosis, occlusion, aneurysm or   vascular malformation.  [x]echo < from: Echocardiogram w/ Bubble and Doppler (01.27.22 @ 13:23) >  CONCLUSIONS:   1. Mild symmetric left ventricular hypertrophy.   2. Normal left and right ventricular size and systolic function.   3. Mildly dilated left atrium.   4. Injection of agitated saline via a peripheral vein reveals no   evidence of a right-to-left shunt.   5. The mitral valve is mildly thickened. Systolic anterior motion of the   mitral valve resulting in an LVOT gradient of 14.00 mmHg and mild LVOT   obstruction. There is moderate to severe posteriorly directed mitral   regurgitation.   6. Aortic sclerosis without significant stenosis.   7. No evidence of pulmonary hypertension.   8. Trivial pericardial effusion.  [x]labs A1c 5.7, LDL 48  []other    Physical exam at discharge: A&Ox0, eyes open, not following commands, non intelligible speech, right hemianopsia, left gaze preference, able to overcome, slight right facial asymmetry, spontaneous movements of the left side to antigravity, grimaces and withdraws to noxious on the right side    NIHSS: 24    New medications on discharge:  Labs to be followed up:  Imaging to be done as outpatient:  Further outpatient workup:   Hospital course:  81y Female with PMHx of CHF (reduced EF), HTN, hx of STEMI in the past, dementia, COVID in 2020 and recently with COVID 13 days ago (unclear vaccination status) who presented to the ED brought in from [facility] due to altered mental status and hypokalemia. CT/MRI done which showed early subacute ischemia left parietal lobe with few punctate foci of subacute ischemia high posterior left frontal lobe. Patient with dysphagia requiring NG tube for tube feeds, however family decided after GOC discussions not to pursue PEG, and made pt DNR/DNI with plan for hospice support at [facility].     During this hospital course, patient had a ischemic strokes located in left parietal and left frontal loves as seen on CT and MRI  The stroke etiology is likely secondary to:  []atrial fibrillation  []small vessel disease from atherosclerotic risk factors  [x]other: consider hypercoagulability from covid, vs other  []etiology workup still in progress    Patient had the following workup done in house:  []head imaging: < from: MR Head No Cont (01.26.22 @ 23:09) >  IMPRESSION:  Evolving acute infarct in the left distal MCA vascular territory   distribution. No hemorrhage.    [x]vessel imaging: < from: CT Angio Head w/ IV Cont (01.26.22 @ 23:59) >  IMPRESSION:    CTA NECK: No vessel narrowing or occlusion in the neck.    CTA HEAD: No evidence of vascular stenosis, occlusion, aneurysm or   vascular malformation.  [x]echo < from: Echocardiogram w/ Bubble and Doppler (01.27.22 @ 13:23) >  CONCLUSIONS:   1. Mild symmetric left ventricular hypertrophy.   2. Normal left and right ventricular size and systolic function.   3. Mildly dilated left atrium.   4. Injection of agitated saline via a peripheral vein reveals no   evidence of a right-to-left shunt.   5. The mitral valve is mildly thickened. Systolic anterior motion of the   mitral valve resulting in an LVOT gradient of 14.00 mmHg and mild LVOT   obstruction. There is moderate to severe posteriorly directed mitral   regurgitation.   6. Aortic sclerosis without significant stenosis.   7. No evidence of pulmonary hypertension.   8. Trivial pericardial effusion.  [x]labs A1c 5.7, LDL 48  []other    Physical exam at discharge: A&Ox0, eyes open, not following commands, non intelligible speech, right hemianopsia, left gaze preference, able to overcome, slight right facial asymmetry, spontaneous movements of the left side to antigravity, grimaces and withdraws to noxious on the right side    NIHSS: 24    New medications on discharge: None. Comfort care.   Labs to be followed up: None.   Imaging to be done as outpatient: None.   Further outpatient workup: None.    81y Female with PMHx of CHF (reduced EF), HTN, hx of STEMI in the past, dementia, COVID in 2020 and recently with COVID 13 days ago (unclear vaccination status) who presented to the ED brought in from [facility] due to altered mental status and hypokalemia. CT/MRI done which showed early subacute ischemia left parietal lobe with few punctate foci of subacute ischemia high posterior left frontal lobe. Patient with dysphagia requiring NG tube for tube feeds, however family decided after GOC discussions not to pursue PEG, and made pt DNR/DNI with plan for hospice support at [facility].     During this hospital course, patient had a ischemic strokes located in left parietal and left frontal loves as seen on CT and MRI  The stroke etiology is likely secondary to:  []atrial fibrillation  []small vessel disease from atherosclerotic risk factors  [x]other: consider hypercoagulability from covid, vs other  []etiology workup still in progress    Patient had the following workup done in house:  []head imaging: < from: MR Head No Cont (01.26.22 @ 23:09) >    CTA NECK: No vessel narrowing or occlusion in the neck.    CTA HEAD: No evidence of vascular stenosis, occlusion, aneurysm or   vascular malformation.  [x]echo < from: Echocardiogram w/ Bubble and Doppler (01.27.22 @ 13:23) >  CONCLUSIONS:   1. Mild symmetric left ventricular hypertrophy.   2. Normal left and right ventricular size and systolic function.   3. Mildly dilated left atrium.   4. Injection of agitated saline via a peripheral vein reveals no   evidence of a right-to-left shunt.   5. The mitral valve is mildly thickened. Systolic anterior motion of the   mitral valve resulting in an LVOT gradient of 14.00 mmHg and mild LVOT   obstruction. There is moderate to severe posteriorly directed mitral   regurgitation.   6. Aortic sclerosis without significant stenosis.   7. No evidence of pulmonary hypertension.   8. Trivial pericardial effusion.  [x]labs A1c 5.7, LDL 48  []other    Physical exam at discharge: A&Ox0, eyes open, not following commands, non intelligible speech, right hemianopsia, left gaze preference, able to overcome, slight right facial asymmetry, spontaneous movements of the left side to antigravity, grimaces and withdraws to noxious on the right side    NIHSS: 24    #Dyspnea.   comfortable on RA. using oxygen via NC intermittently. now comfort care.  PLAN:  - start dilaudid 0.25 mg IV q3 PRN RR 22-26 or mild pain  - start dilaudid 0.5 mg IV q3 PRN RR>26 or moderate pain  - low threshold to repeat dose or increase PRN frequency to maximize comfort at EOL  - cw oxygen via NC if akbar by patient.    #Increased oropharyngeal secretions.   expect to increase as pt progresses through dying process.   PLAN:  - recommend glyco 0.2 mg IV q6 PRN secretions   - do not recommend scop patch at this time.    #Oropharyngeal dysphagia.   sp stroke. currently with NGT. pt agitated with oral care.   PLAN:   - recommend oral hygiene q6 around the clock  - PEG is NOT within GOC  - GOC: food for pleasure and comfort with strict asp precautions if awake and willing   - pending SS eval.    #Functional quadriplegia.   sp stroke. pps 30%  - assist with reposition q2h, oral care, skin care PRN.    #CVA (cerebrovascular accident).   presented from NH with AMS. MRI Brain without contrast early subacute ischemia left parietal lobe with few punctate foci of subacute ischemia high posterior left frontal lobe.  - appreciate Neuro recs  - cw lovenox sq through discharge.    #Advanced dementia.   long term resident at [place] prior to admission. pt is sp stroke, now bedbound, incont of B and B, oropharyngeal dysphagia and PEG not within GOC. prognosis likely weeks without adequate nutrition  - appreciate palliative VALERIE Santamaria assistance with discharge to NH and resuming hospice.    #Goals of care, counseling/discussion.   - prognosis likely weeks without adequate nutrition sp stroke   - DNR DNI No PEG MOLST completed and placed in physical chart  - comfort care, mews exempt   - No further labs, IVF, or abx     New medications on discharge: None. Comfort care.   Labs to be followed up: None.   Imaging to be done as outpatient: None.   Further outpatient workup: None.    81y Female with PMHx of CHF (reduced EF), HTN, hx of STEMI in the past, dementia, COVID in 2020 and recently with COVID 13 days ago (unclear vaccination status) who presented to the ED brought in from [facility] due to altered mental status and hypokalemia. CT/MRI done which showed early subacute ischemia left parietal lobe with few punctate foci of subacute ischemia high posterior left frontal lobe. Patient with dysphagia requiring NG tube for tube feeds, however family decided after GOC discussions not to pursue PEG, and made pt DNR/DNI with plan for hospice support at [facility]. Patient will get comfort feeds moving forward    During this hospital course, patient had a ischemic strokes located in left parietal and left frontal loves as seen on CT and MRI  The stroke etiology is likely secondary to:  []atrial fibrillation  []small vessel disease from atherosclerotic risk factors  [x]other: consider hypercoagulability from covid, vs other  []etiology workup still in progress    Patient had the following workup done in house:  []head imaging: < from: MR Head No Cont (01.26.22 @ 23:09) >    CTA NECK: No vessel narrowing or occlusion in the neck.    CTA HEAD: No evidence of vascular stenosis, occlusion, aneurysm or   vascular malformation.  [x]echo < from: Echocardiogram w/ Bubble and Doppler (01.27.22 @ 13:23) >  CONCLUSIONS:   1. Mild symmetric left ventricular hypertrophy.   2. Normal left and right ventricular size and systolic function.   3. Mildly dilated left atrium.   4. Injection of agitated saline via a peripheral vein reveals no   evidence of a right-to-left shunt.   5. The mitral valve is mildly thickened. Systolic anterior motion of the   mitral valve resulting in an LVOT gradient of 14.00 mmHg and mild LVOT   obstruction. There is moderate to severe posteriorly directed mitral   regurgitation.   6. Aortic sclerosis without significant stenosis.   7. No evidence of pulmonary hypertension.   8. Trivial pericardial effusion.  [x]labs A1c 5.7, LDL 48  []other    Physical exam at discharge: A&Ox0, eyes open, not following commands, non intelligible speech, right hemianopsia, left gaze preference, able to overcome, slight right facial asymmetry, spontaneous movements of the left side to antigravity, grimaces and withdraws to noxious on the right side    NIHSS: 24    #Dyspnea.   comfortable on RA. using oxygen via NC intermittently. now comfort care.  PLAN:  - start dilaudid 0.25 mg IV q3 PRN RR 22-26 or mild pain  - start dilaudid 0.5 mg IV q3 PRN RR>26 or moderate pain  - low threshold to repeat dose or increase PRN frequency to maximize comfort at EOL  - cw oxygen via NC if akbar by patient.    #Increased oropharyngeal secretions.   expect to increase as pt progresses through dying process.   PLAN:  - recommend glyco 0.2 mg IV q6 PRN secretions   - do not recommend scop patch at this time.    #Oropharyngeal dysphagia.   sp stroke. currently with NGT. pt agitated with oral care.   PLAN:   - recommend oral hygiene q6 around the clock  - PEG is NOT within GOC  - GOC: food for pleasure and comfort with strict asp precautions if awake and willing   - pending SS eval.    #Functional quadriplegia.   sp stroke. pps 30%  - assist with reposition q2h, oral care, skin care PRN.    #CVA (cerebrovascular accident).   presented from NH with AMS. MRI Brain without contrast early subacute ischemia left parietal lobe with few punctate foci of subacute ischemia high posterior left frontal lobe.  - appreciate Neuro recs  - cw lovenox sq through discharge.    #Advanced dementia.   long term resident at [place] prior to admission. pt is sp stroke, now bedbound, incont of B and B, oropharyngeal dysphagia and PEG not within GOC. prognosis likely weeks without adequate nutrition  - appreciate palliative VALERIE Santamaria assistance with discharge to NH and resuming hospice.    #Goals of care, counseling/discussion.   - prognosis likely weeks without adequate nutrition sp stroke   - DNR DNI No PEG MOLST completed and placed in physical chart  - comfort care, mews exempt   - No further labs, IVF, or abx     New medications on discharge: None. Comfort care.   Labs to be followed up: None.   Imaging to be done as outpatient: None.   Further outpatient workup: None.

## 2022-02-01 NOTE — PROGRESS NOTE ADULT - SUBJECTIVE AND OBJECTIVE BOX
Patient is a 81y old  Female who presents with a chief complaint of AMS, stroke on CT/MRI (01 Feb 2022 07:19)      INTERVAL HPI/OVERNIGHT EVENTS:    Pt. seen and examined earlier today  Pt. non-verbal; no complaints elicited, ROS limited due to advanced dementia    Review of Systems: 12 point review of systems otherwise negative    MEDICATIONS  (STANDING):  aspirin  chewable 81 milliGRAM(s) Oral every 24 hours  atorvastatin 80 milliGRAM(s) Oral at bedtime  cholecalciferol 2000 Unit(s) Oral daily  enoxaparin Injectable 40 milliGRAM(s) SubCutaneous every 24 hours    MEDICATIONS  (PRN):      Allergies    No Known Allergies    Intolerances          Vital Signs Last 24 Hrs  T(C): 36.4 (01 Feb 2022 06:00), Max: 36.7 (31 Jan 2022 21:50)  T(F): 97.5 (01 Feb 2022 06:00), Max: 98.1 (31 Jan 2022 21:50)  HR: 62 (01 Feb 2022 06:00) (56 - 62)  BP: 106/63 (01 Feb 2022 06:00) (106/63 - 129/72)  BP(mean): --  RR: 16 (01 Feb 2022 06:00) (16 - 19)  SpO2: 95% (01 Feb 2022 06:00) (95% - 96%)  CAPILLARY BLOOD GLUCOSE      POCT Blood Glucose.: 99 mg/dL (01 Feb 2022 08:37)  POCT Blood Glucose.: 127 mg/dL (01 Feb 2022 06:09)  POCT Blood Glucose.: 116 mg/dL (01 Feb 2022 00:12)  POCT Blood Glucose.: 115 mg/dL (31 Jan 2022 17:42)  POCT Blood Glucose.: 129 mg/dL (31 Jan 2022 12:02)      01-31 @ 07:01  -  02-01 @ 07:00  --------------------------------------------------------  IN: 762 mL / OUT: 750 mL / NET: 12 mL        Physical Exam:  (earlier today)  Daily     Daily   General:  ill but comfortable-appearing in NAD  HEENT:  +NGT  CV:  RRR, no JVD  Lungs:  CTA B/L anteriorly; normal WOB on RA  Abdomen:  soft NT ND  Extremities:  no edema B/L LE  Skin:  WWP  :  +PrimaFit  Neuro:  AAOx0, doesn't follow commands      LABS:                        13.0   10.12 )-----------( 231      ( 31 Jan 2022 06:39 )             39.3     01-31    133<L>  |  96  |  14  ----------------------------<  109<H>  4.2   |  32<H>  |  0.45<L>    Ca    8.6      31 Jan 2022 06:39  Phos  2.8     01-31  Mg     1.7     01-31

## 2022-02-01 NOTE — DISCHARGE NOTE PROVIDER - NSDCQMMRS_NEU_ALL_CORE
4 - Moderately severe disability. Unable to own bodily needs without assistance and unable to walk unassisted 5 - Severe disability.  Requires constant nursing care and attention, bedridden, incontinent.

## 2022-02-01 NOTE — PROGRESS NOTE ADULT - ASSESSMENT
81y Female with PMHx of CHF (reduced EF), HTN, hx of STEMI in the past, dementia, COVID in 2020 and recently with COVID 13 days ago (unclear vaccination status) who presented to the ED brought in from [facility] due to altered mental status and hypokalemia. CT/MRI done which showed early subacute ischemia left parietal lobe with few punctate foci of subacute ischemia high posterior left frontal lobe. therefore, admitted to stroke team for further workup.

## 2022-02-01 NOTE — DISCHARGE NOTE PROVIDER - NSDCCPCAREPLAN_GEN_ALL_CORE_FT
PRINCIPAL DISCHARGE DIAGNOSIS  Diagnosis: Cerebrovascular accident (CVA)  Assessment and Plan of Treatment: During this hospital admission, you had an ischemic stroke. During an ischemic stroke, blood stops flowing to part of your brain because of a blockage in the blood vessel. This can damage areas in the brain that control other parts of the body.  Please take your aspirin for blood thinning and Atorvastatin for cholesterol medication/blood vessel protection as prescribed to prevent further strokes. Do not skip doses and do not run low on your medication. If you run low on your medication, please contact your doctor.  You will follow up outpatient with the stroke Nurse Practitioner as scheduled below.  Doing your regular tasks may be difficult after you've had a stroke, but you can learn new ways to manage your daily activities. In fact, doing daily activities may help you to regain muscle strength. Be patient, give yourself time to adjust, and appreciate the progress you make. For example, when showering or bathing, test the water temperature with a hand or foot that was not affected by the stroke, use grab bars, a shower seat, a hand-held showerhead, etc. It is normal to feel fatigue after a stroke, while some days may be worse than others, you will continue to improve.  Call 911 right away if you have any of the following symptoms of another stroke:  B: Balance: Sudden: Dizziness, loss of balance, or a sense of falling, difficulty with coordinating movement  E: Eyes: Sudden double vision or trouble seeing in one or both eyes  F: Face: Sudden uneven face  A: Arms (Legs): Sudden weakness, tingling, or loss of feeling on one side of your face or body  S: Speech: Sudden trouble talking or slurred speech, sudden difficulty understanding others  T: Time: Please call 911 right away and go to the emergency room  •Sudden, severe headache  •Blackouts or seizures      SECONDARY DISCHARGE DIAGNOSES  Diagnosis: Hypokalemia  Assessment and Plan of Treatment:      PRINCIPAL DISCHARGE DIAGNOSIS  Diagnosis: Cerebrovascular accident (CVA)  Assessment and Plan of Treatment: During this hospital admission, you had an ischemic stroke. During an ischemic stroke, blood stops flowing to part of your brain because of a blockage in the blood vessel. This can damage areas in the brain that control other parts of the body.  Please take your aspirin for blood thinning and Atorvastatin for cholesterol medication/blood vessel protection as prescribed to prevent further strokes. Do not skip doses and do not run low on your medication. If you run low on your medication, please contact your doctor.  You will follow up outpatient with the stroke Nurse Practitioner as scheduled below.  Doing your regular tasks may be difficult after you've had a stroke, but you can learn new ways to manage your daily activities. In fact, doing daily activities may help you to regain muscle strength. Be patient, give yourself time to adjust, and appreciate the progress you make. For example, when showering or bathing, test the water temperature with a hand or foot that was not affected by the stroke, use grab bars, a shower seat, a hand-held showerhead, etc. It is normal to feel fatigue after a stroke, while some days may be worse than others, you will continue to improve.  Call 911 right away if you have any of the following symptoms of another stroke:  B: Balance: Sudden: Dizziness, loss of balance, or a sense of falling, difficulty with coordinating movement  E: Eyes: Sudden double vision or trouble seeing in one or both eyes  F: Face: Sudden uneven face  A: Arms (Legs): Sudden weakness, tingling, or loss of feeling on one side of your face or body  S: Speech: Sudden trouble talking or slurred speech, sudden difficulty understanding others  T: Time: Please call 911 right away and go to the emergency room  •Sudden, severe headache  •Blackouts or seizures      SECONDARY DISCHARGE DIAGNOSES  Diagnosis: Dyspnea  Assessment and Plan of Treatment: Comfortable on room air. Using oxygen via nasal canula intermittently. now comfort care.  PLAN:  - start dilaudid 0.25 mg IV q3 PRN RR 22-26 or mild pain  - start dilaudid 0.5 mg IV q3 PRN RR>26 or moderate pain  - low threshold to repeat dose or increase PRN frequency to maximize comfort  - continue with oxygen via nasal canula if akbar by patient.      Diagnosis: Increased oropharyngeal secretions  Assessment and Plan of Treatment: Expect to increase as patient progresses through dying process.   PLAN:  - recommend glyco 0.2 mg IV q6 PRN secretions   - do not recommend scop patch at this time.      Diagnosis: Functional quadriplegia  Assessment and Plan of Treatment: After stroke. currently with nasogastric trube. patient agitated with oral care.   PLAN:  - recommend oral hygiene every 6 hours around the clock  - PEG is NOT within goals of care  - Goals of care: food for pleasure and comfort with strict aspiration precautions if awake and willing       Diagnosis: Advanced dementia  Assessment and Plan of Treatment: Assist with reposition every 2 hours, oral care, skin care PRN.      Diagnosis: Goals of care, counseling/discussion  Assessment and Plan of Treatment: Presented with altered mental status. MRI Brain without contrast early subacute ischemia left parietal lobe with few punctate foci of subacute ischemia high posterior left frontal lobe.  PLAN:   - Continue with Lovenox 30mg SQ daily through discharge.     PRINCIPAL DISCHARGE DIAGNOSIS  Diagnosis: Cerebrovascular accident (CVA)  Assessment and Plan of Treatment: During this hospital admission, pt had an ischemic stroke. During an ischemic stroke, blood stops flowing to part of your brain because of a blockage in the blood vessel. This can damage areas in the brain that control other parts of the body.      SECONDARY DISCHARGE DIAGNOSES  Diagnosis: Dyspnea  Assessment and Plan of Treatment: Comfortable on room air. Using oxygen via nasal canula intermittently. now comfort care.  PLAN:  - start dilaudid 0.25 mg IV q3 PRN RR 22-26 or mild pain  - start dilaudid 0.5 mg IV q3 PRN RR>26 or moderate pain  - low threshold to repeat dose or increase PRN frequency to maximize comfort  - continue with oxygen via nasal canula if akbar by patient.      Diagnosis: Increased oropharyngeal secretions  Assessment and Plan of Treatment: Expect to increase as patient progresses through dying process.   PLAN:  - recommend glyco 0.2 mg IV q6 PRN secretions   - do not recommend scop patch at this time.      Diagnosis: Functional quadriplegia  Assessment and Plan of Treatment: After stroke. currently with nasogastric trube. patient agitated with oral care.   PLAN:  - recommend oral hygiene every 6 hours around the clock  - PEG is NOT within goals of care  - Goals of care: food for pleasure and comfort with strict aspiration precautions if awake and willing       Diagnosis: Advanced dementia  Assessment and Plan of Treatment: Assist with reposition every 2 hours, oral care, skin care PRN.      Diagnosis: Goals of care, counseling/discussion  Assessment and Plan of Treatment: Presented with altered mental status. MRI Brain without contrast early subacute ischemia left parietal lobe with few punctate foci of subacute ischemia high posterior left frontal lobe.  PLAN:   - Continue with Lovenox 30mg SQ daily through discharge.     PRINCIPAL DISCHARGE DIAGNOSIS  Diagnosis: Cerebrovascular accident (CVA)  Assessment and Plan of Treatment: During this hospital admission, pt had an ischemic stroke. During an ischemic stroke, blood stops flowing to part of your brain because of a blockage in the blood vessel. This can damage areas in the brain that control other parts of the body.      SECONDARY DISCHARGE DIAGNOSES  Diagnosis: Dyspnea  Assessment and Plan of Treatment: Comfortable on room air. Using oxygen via nasal canula intermittently. now comfort care.  PLAN:  - start dilaudid 0.25 mg IV q3 PRN RR 22-26 or mild pain  - start dilaudid 0.5 mg IV q3 PRN RR>26 or moderate pain  - low threshold to repeat dose or increase PRN frequency to maximize comfort  - continue with oxygen via nasal canula if akbar by patient.      Diagnosis: Increased oropharyngeal secretions  Assessment and Plan of Treatment: Expect to increase as patient progresses through dying process.   PLAN:  - recommend glyco 0.2 mg IV q6 PRN secretions   - do not recommend scop patch at this time.      Diagnosis: Functional quadriplegia  Assessment and Plan of Treatment: After stroke. patient agitated with oral care.   PLAN:  - recommend oral hygiene every 6 hours around the clock  - PEG is NOT within goals of care  - Goals of care: food for pleasure and comfort with strict aspiration precautions if awake and willing       Diagnosis: Advanced dementia  Assessment and Plan of Treatment: Assist with reposition every 2 hours, oral care, skin care PRN.      Diagnosis: Goals of care, counseling/discussion  Assessment and Plan of Treatment: Presented with altered mental status. MRI Brain without contrast early subacute ischemia left parietal lobe with few punctate foci of subacute ischemia high posterior left frontal lobe.  PLAN:   - Continue with Lovenox 30mg SQ daily through discharge.

## 2022-02-01 NOTE — PROGRESS NOTE ADULT - SUBJECTIVE AND OBJECTIVE BOX
***Note in progress***    OVERNIGHT EVENTS: NAEO    SUBJECTIVE / INTERVAL HPI: Patient seen and examined at bedside.     Remaining ROS negative     PHYSICAL EXAM:  GENERAL: In NAD. Lying in bed comfortably. Off NC. NG tube in place  HEENT: NC/AT. PERRL. EOMI. NG tube in place  CV: Regular rate, irregular rhythm.  Lungs: CTAB  ABD: Soft, NT, ND.  EXT: WWP. No edema in b/l extremities  NEURO: AAOx0. RUE and LE flaccid. Moves left extremity spontaneously, against gravity    VITAL SIGNS:  Vital Signs Last 24 Hrs  T(C): 36.4 (01 Feb 2022 06:00), Max: 36.7 (31 Jan 2022 21:50)  T(F): 97.5 (01 Feb 2022 06:00), Max: 98.1 (31 Jan 2022 21:50)  HR: 62 (01 Feb 2022 06:00) (56 - 62)  BP: 106/63 (01 Feb 2022 06:00) (106/63 - 129/72)  RR: 16 (01 Feb 2022 06:00) (16 - 19)  SpO2: 95% (01 Feb 2022 06:00) (95% - 96%)    MEDICATIONS:  MEDICATIONS  (STANDING):  aspirin  chewable 81 milliGRAM(s) Oral every 24 hours  atorvastatin 80 milliGRAM(s) Oral at bedtime  cholecalciferol 2000 Unit(s) Oral daily  enoxaparin Injectable 40 milliGRAM(s) SubCutaneous every 24 hours    ALLERGIES:  No Known Allergies    LABS:                        13.0   10.12 )-----------( 231      ( 31 Jan 2022 06:39 )             39.3     01-31    133<L>  |  96  |  14  ----------------------------<  109<H>  4.2   |  32<H>  |  0.45<L>    Ca    8.6      31 Jan 2022 06:39  Phos  2.8     01-31  Mg     1.7     01-31    CAPILLARY BLOOD GLUCOSE  POCT Blood Glucose.: 127 mg/dL (01 Feb 2022 06:09)    RADIOLOGY & ADDITIONAL TESTS: Reviewed. OVERNIGHT EVENTS: NAEO    SUBJECTIVE / INTERVAL HPI: Patient seen and examined at bedside. Non-responsive to verbal orders, with eyes open.    Remaining ROS negative     PHYSICAL EXAM:  GENERAL: In NAD. Lying in bed comfortably. Off NC. NG tube in place  HEENT: NC/AT. PERRL. EOMI. NG tube in place  CV: Regular rate, irregular rhythm.  Lungs: CTAB  ABD: Soft, NT, ND.  EXT: WWP. No edema in b/l extremities  NEURO: AAOx0. RUE and LE flaccid. Moves left extremity spontaneously, against gravity    VITAL SIGNS:  Vital Signs Last 24 Hrs  T(C): 36.4 (01 Feb 2022 06:00), Max: 36.7 (31 Jan 2022 21:50)  T(F): 97.5 (01 Feb 2022 06:00), Max: 98.1 (31 Jan 2022 21:50)  HR: 62 (01 Feb 2022 06:00) (56 - 62)  BP: 106/63 (01 Feb 2022 06:00) (106/63 - 129/72)  RR: 16 (01 Feb 2022 06:00) (16 - 19)  SpO2: 95% (01 Feb 2022 06:00) (95% - 96%)    MEDICATIONS:  MEDICATIONS  (STANDING):  aspirin  chewable 81 milliGRAM(s) Oral every 24 hours  atorvastatin 80 milliGRAM(s) Oral at bedtime  cholecalciferol 2000 Unit(s) Oral daily  enoxaparin Injectable 40 milliGRAM(s) SubCutaneous every 24 hours    ALLERGIES:  No Known Allergies    LABS:                        13.0   10.12 )-----------( 231      ( 31 Jan 2022 06:39 )             39.3     01-31    133<L>  |  96  |  14  ----------------------------<  109<H>  4.2   |  32<H>  |  0.45<L>    Ca    8.6      31 Jan 2022 06:39  Phos  2.8     01-31  Mg     1.7     01-31    CAPILLARY BLOOD GLUCOSE  POCT Blood Glucose.: 127 mg/dL (01 Feb 2022 06:09)    RADIOLOGY & ADDITIONAL TESTS: Reviewed.

## 2022-02-01 NOTE — DISCHARGE NOTE PROVIDER - CARE PROVIDER_API CALL
Gamaliel Lester)  Neurology; Vascular Neurology  [address], [address] [place] [address]  Phone: [phone]  Fax: [phone]  Follow Up Time: 2 weeks   Suzanna Zaman  NEUROLOGY/VASCULAR NEUROLOGY  [address] [place] [address]  Phone: [phone]  Fax: (   )    -  Scheduled Appointment: 04/19/2022 02:20 PM

## 2022-02-01 NOTE — DISCHARGE NOTE PROVIDER - PROVIDER TOKENS
PROVIDER:[TOKEN:[[id number]],FOLLOWUP:[2 weeks]] FREE:[LAST:[Austyn],FIRST:[Suzanna WARNER],PHONE:[phone]],FAX:[(   )    -],ADDRESS:[NEUROLOGY/VASCULAR NEUROLOGY  [address] [place] [address]. [place] [address]],SCHEDULEDAPPT:[04/19/2022],SCHEDULEDAPPTTIME:[02:20 PM]]

## 2022-02-02 ENCOUNTER — TRANSCRIPTION ENCOUNTER (OUTPATIENT)
Age: 82
End: 2022-02-02

## 2022-02-02 VITALS
RESPIRATION RATE: 16 BRPM | DIASTOLIC BLOOD PRESSURE: 63 MMHG | OXYGEN SATURATION: 97 % | TEMPERATURE: 98 F | SYSTOLIC BLOOD PRESSURE: 119 MMHG | HEART RATE: 52 BPM

## 2022-02-02 LAB
GLUCOSE BLDC GLUCOMTR-MCNC: 67 MG/DL — LOW (ref 70–99)
GLUCOSE BLDC GLUCOMTR-MCNC: 76 MG/DL — SIGNIFICANT CHANGE UP (ref 70–99)
GLUCOSE BLDC GLUCOMTR-MCNC: 80 MG/DL — SIGNIFICANT CHANGE UP (ref 70–99)
SARS-COV-2 RNA SPEC QL NAA+PROBE: SIGNIFICANT CHANGE UP

## 2022-02-02 PROCEDURE — U0005: CPT

## 2022-02-02 PROCEDURE — 80053 COMPREHEN METABOLIC PANEL: CPT

## 2022-02-02 PROCEDURE — 80061 LIPID PANEL: CPT

## 2022-02-02 PROCEDURE — 99285 EMERGENCY DEPT VISIT HI MDM: CPT | Mod: 25

## 2022-02-02 PROCEDURE — 99233 SBSQ HOSP IP/OBS HIGH 50: CPT

## 2022-02-02 PROCEDURE — 83605 ASSAY OF LACTIC ACID: CPT

## 2022-02-02 PROCEDURE — 83036 HEMOGLOBIN GLYCOSYLATED A1C: CPT

## 2022-02-02 PROCEDURE — 85730 THROMBOPLASTIN TIME PARTIAL: CPT

## 2022-02-02 PROCEDURE — 85025 COMPLETE CBC W/AUTO DIFF WBC: CPT

## 2022-02-02 PROCEDURE — 92507 TX SP LANG VOICE COMM INDIV: CPT

## 2022-02-02 PROCEDURE — 70551 MRI BRAIN STEM W/O DYE: CPT | Mod: MA

## 2022-02-02 PROCEDURE — 83735 ASSAY OF MAGNESIUM: CPT

## 2022-02-02 PROCEDURE — U0003: CPT

## 2022-02-02 PROCEDURE — 70498 CT ANGIOGRAPHY NECK: CPT | Mod: MA

## 2022-02-02 PROCEDURE — 97162 PT EVAL MOD COMPLEX 30 MIN: CPT

## 2022-02-02 PROCEDURE — 87040 BLOOD CULTURE FOR BACTERIA: CPT

## 2022-02-02 PROCEDURE — 85027 COMPLETE CBC AUTOMATED: CPT

## 2022-02-02 PROCEDURE — 92526 ORAL FUNCTION THERAPY: CPT

## 2022-02-02 PROCEDURE — 96374 THER/PROPH/DIAG INJ IV PUSH: CPT

## 2022-02-02 PROCEDURE — 85610 PROTHROMBIN TIME: CPT

## 2022-02-02 PROCEDURE — 70496 CT ANGIOGRAPHY HEAD: CPT | Mod: MA

## 2022-02-02 PROCEDURE — 70450 CT HEAD/BRAIN W/O DYE: CPT | Mod: MA

## 2022-02-02 PROCEDURE — 99231 SBSQ HOSP IP/OBS SF/LOW 25: CPT

## 2022-02-02 PROCEDURE — 84100 ASSAY OF PHOSPHORUS: CPT

## 2022-02-02 PROCEDURE — 36415 COLL VENOUS BLD VENIPUNCTURE: CPT

## 2022-02-02 PROCEDURE — 93306 TTE W/DOPPLER COMPLETE: CPT

## 2022-02-02 PROCEDURE — 80048 BASIC METABOLIC PNL TOTAL CA: CPT

## 2022-02-02 PROCEDURE — 74018 RADEX ABDOMEN 1 VIEW: CPT

## 2022-02-02 PROCEDURE — 82962 GLUCOSE BLOOD TEST: CPT

## 2022-02-02 PROCEDURE — 84443 ASSAY THYROID STIM HORMONE: CPT

## 2022-02-02 PROCEDURE — 71045 X-RAY EXAM CHEST 1 VIEW: CPT

## 2022-02-02 PROCEDURE — 97161 PT EVAL LOW COMPLEX 20 MIN: CPT

## 2022-02-02 NOTE — PROGRESS NOTE ADULT - SUBJECTIVE AND OBJECTIVE BOX
SUBJECTIVE: pt seen and examined. difficult to rouse. unable to participate in interview. appears comfortable.     OVERNIGHT EVENTS:    DNR in chart:      If  [ ] blank, symptom not present  Dyspnea:                           [ ]Mild [ ]Moderate [ ]Severe  Anxiety:                             [ ]Mild [ ]Moderate [ ]Severe  Fatigue:                             [ ]Mild [ ]Moderate [ ]Severe  Nausea:                             [ ]Mild [ ]Moderate [ ]Severe  Loss of appetite:              [ ]Mild [ ]Moderate [ ]Severe  Constipation:                    [ ]Mild [ ]Moderate [ ]Severe  Grief Present                    [ ] Yes   [ ] No     Pain  Location :        Quality:  Radiation:  Timing:  Aggravating factors:  Minimal acceptable level (0-10 scale):   Severity in last 24h (0-10 scale) :  Current score (0-10 scale):  Improves with:       Rest of 12 point review of systems negative unless documented otherwise in note.  Unable to obtain ROS due to pt mentation.   ROS limited due to patient mentation.     PEx:  T(C): 36.2 (02-02-22 @ 05:34), Max: 36.6 (02-01-22 @ 13:11)  HR: 62 (02-02-22 @ 05:34) (54 - 62)  BP: 135/81 (02-02-22 @ 05:34) (118/65 - 135/81)  RR: 16 (02-02-22 @ 05:34) (16 - 18)  SpO2: 96% (02-02-22 @ 05:34) (93% - 96%)  Wt(kg): --  	   ALLERGIES: No Known Allergies      MEDICATIONS: REVIEWED  MEDICATIONS  (STANDING):  aspirin  chewable 81 milliGRAM(s) Oral every 24 hours  atorvastatin 80 milliGRAM(s) Oral at bedtime  cholecalciferol 2000 Unit(s) Oral daily    MEDICATIONS  (PRN):      CRITICAL CARE:  [ ]Shock Present  [ ]Septic [ ]Cardiogenic [ ]Neurologic [ ]Hypovolemic    [ ]Vasopressors [ ]Inotropes    [ ]Respiratory failure present   [ ]Mechanical Ventilation   [  ]Non-invasive ventilatory support   [ ]High-Flow  [ ]Acute  [ ]Chronic   [ ]Hypoxic  [ ]Hypercarbic   [ ]Other    [ ]Other organ failure     LABS: REVIEWED  CBC:    CMP:            IMAGING: REVIEWED    PROTEIN CALORIE MALNUTRITION PRESENT:  [ ]mild [ ]moderate []severe [ ]underweight [ ]morbid obesity  [ ] PPSV2 < or = 30%   [ ] significant weight loss   [ ] poor nutritional intake   [ ] anasarca   [ ] catabolic state      Artificial Nutrition [ ]     Decision maker: The patient is able to participate in complex medical decision making conversations.   Legal surrogate:    ADVANCE DIRECTIVES & GOALS OF CARE  - Full Code  - DNR/DNI  - MOLST not on file in [facility]    PSYCHOSOCIAL ASSESSMENT:  - Buddhist/Spiritual practice:  - Role of organized Voodoo [ ] important [ ] some [ ] unable to assess dt pt mentation     - Coping: [ ] well [ ] with difficulty [ ] poor coping [ ] unable to assess dt pt mentation  - What is most important to patient?  - What worries patient the most?  - Is patient at peace? :     REFERRALS:  [ ] palliative social work [ ]Chaplaincy  [ ]Hospice  [ ]Child Life  [ ]Social Work  [ ]Case management [  ]Holistic Therapy

## 2022-02-02 NOTE — PROGRESS NOTE ADULT - PROBLEM SELECTOR PLAN 5
- TSH results: .410, thyromegaly on CTA
likely due to known CHF; Pt. not hypoxic or symptomatic; monitor volume status

## 2022-02-02 NOTE — PROGRESS NOTE ADULT - PROBLEM SELECTOR PROBLEM 2
Advanced dementia
Pneumonia due to COVID-19 virus
Advanced dementia
Pneumonia due to COVID-19 virus
Advanced dementia

## 2022-02-02 NOTE — PROGRESS NOTE ADULT - SUBJECTIVE AND OBJECTIVE BOX
***Note in progress***    OVERNIGHT EVENTS: NAEO    SUBJECTIVE / INTERVAL HPI: Patient seen and examined at bedside.     Remaining ROS negative     PHYSICAL EXAM:  GENERAL: In NAD. Lying in bed comfortably. Off NC. NG tube in place  HEENT: NC/AT. PERRL. EOMI. NG tube in place  CV: Regular rate, irregular rhythm.  Lungs: CTAB  ABD: Soft, NT, ND.  EXT: WWP. No edema in b/l extremities  NEURO: AAOx0. RUE and LE flaccid. Moves left extremity spontaneously, against gravity    VITAL SIGNS:  Vital Signs Last 24 Hrs  T(C): 36.2 (02 Feb 2022 05:34), Max: 36.6 (01 Feb 2022 13:11)  T(F): 97.2 (02 Feb 2022 05:34), Max: 97.8 (01 Feb 2022 13:11)  HR: 62 (02 Feb 2022 05:34) (54 - 62)  BP: 135/81 (02 Feb 2022 05:34) (118/65 - 135/81)  BP(mean): --  RR: 16 (02 Feb 2022 05:34) (16 - 18)  SpO2: 96% (02 Feb 2022 05:34) (93% - 96%)    MEDICATIONS:  MEDICATIONS  (STANDING):  aspirin  chewable 81 milliGRAM(s) Oral every 24 hours  atorvastatin 80 milliGRAM(s) Oral at bedtime  cholecalciferol 2000 Unit(s) Oral daily    MEDICATIONS  (PRN):      ALLERGIES:  Allergies    No Known Allergies    Intolerances        LABS:              CAPILLARY BLOOD GLUCOSE      POCT Blood Glucose.: 80 mg/dL (02 Feb 2022 06:11)      RADIOLOGY & ADDITIONAL TESTS: Reviewed. OVERNIGHT EVENTS: NAEO    SUBJECTIVE / INTERVAL HPI: Patient seen and examined at bedside. No changes, non-verbal, eyes open.    Remaining ROS negative     PHYSICAL EXAM:  GENERAL: In NAD. Lying in bed comfortably. Off NC. NG tube in place  HEENT: NC/AT. PERRL. EOMI. NG tube in place  CV: Regular rate, irregular rhythm.  Lungs: CTAB  ABD: Soft, NT, ND.  EXT: WWP. No edema in b/l extremities  NEURO: AAOx0. RUE and LE flaccid. Moves left extremity spontaneously, against gravity    VITAL SIGNS:  Vital Signs Last 24 Hrs  T(C): 36.2 (02 Feb 2022 05:34), Max: 36.6 (01 Feb 2022 13:11)  T(F): 97.2 (02 Feb 2022 05:34), Max: 97.8 (01 Feb 2022 13:11)  HR: 62 (02 Feb 2022 05:34) (54 - 62)  BP: 135/81 (02 Feb 2022 05:34) (118/65 - 135/81)  RR: 16 (02 Feb 2022 05:34) (16 - 18)  SpO2: 96% (02 Feb 2022 05:34) (93% - 96%)    MEDICATIONS:  MEDICATIONS  (STANDING):  aspirin  chewable 81 milliGRAM(s) Oral every 24 hours  atorvastatin 80 milliGRAM(s) Oral at bedtime  cholecalciferol 2000 Unit(s) Oral daily    ALLERGIES:  No Known Allergies    CAPILLARY BLOOD GLUCOSE  POCT Blood Glucose.: 80 mg/dL (02 Feb 2022 06:11)    RADIOLOGY & ADDITIONAL TESTS: Reviewed.

## 2022-02-02 NOTE — DISCHARGE NOTE NURSING/CASE MANAGEMENT/SOCIAL WORK - PATIENT PORTAL LINK FT
You can access the FollowMyHealth Patient Portal offered by [facility] by registering at the following website: [email or web address]. By joining [facility] FollowMyHealth portal, you will also be able to view your health information using other applications (apps) compatible with our system.

## 2022-02-02 NOTE — PROGRESS NOTE ADULT - REASON FOR ADMISSION
AMS, stroke on CT/MRI

## 2022-02-02 NOTE — PROGRESS NOTE ADULT - PROBLEM SELECTOR PROBLEM 4
Bradycardia
Bradycardia
Chronic systolic congestive heart failure
Bradycardia
Chronic systolic congestive heart failure
Bradycardia
Chronic systolic congestive heart failure

## 2022-02-02 NOTE — PROGRESS NOTE ADULT - PROBLEM/PLAN-1
13-Apr-2017
DISPLAY PLAN FREE TEXT

## 2022-02-02 NOTE — DISCHARGE NOTE NURSING/CASE MANAGEMENT/SOCIAL WORK - NSDCFUADDAPPT_GEN_ALL_CORE_FT
Please bring your Insurance card, Photo ID, Covid-19 vaccination card (if applicable) and Discharge paperwork to the following appointment:    (1) Please follow up with your Neurology Provider, Dr. Suzanna Zaman on behalf of Dr. Dr. Lester at [address] [place] [address], [address] [place] [address] on 04/19/2022 at 2:20pm.    Please note that the office of Dr. Zaman will contact you to schedule an earlier appointment with the Nurse Practitioner for Dr. Lester once available.    Appointment was scheduled by Ms. ALL Cruz, Referral Coordinator.

## 2022-02-02 NOTE — PROGRESS NOTE ADULT - PROBLEM SELECTOR PROBLEM 5
Pleural effusion, bilateral
Thyromegaly
Pleural effusion, bilateral

## 2022-02-02 NOTE — PROGRESS NOTE ADULT - PROBLEM SELECTOR PROBLEM 3
Chronic systolic congestive heart failure
Chronic systolic congestive heart failure
Bradycardia
Bradycardia
Chronic systolic congestive heart failure
Oropharyngeal dysphagia
Oropharyngeal dysphagia
Chronic systolic congestive heart failure
Oropharyngeal dysphagia

## 2022-02-02 NOTE — PROGRESS NOTE ADULT - PROBLEM SELECTOR PLAN 6
have been holding beta-blocker due to bradycardia, as above
- high dose statin as above in CVA  - LDL results: 48
holding beta-blocker, as above
- high dose statin as above in CVA  - LDL results: 48
holding beta-blocker due to bradycardia, as above
holding beta-blocker, as above
have been holding beta-blocker due to bradycardia, as above

## 2022-02-02 NOTE — PROGRESS NOTE ADULT - TIME BILLING
d/w Medicine resident  poor prognosis  DNR/DNI  comfort care; no MEWS or escalation of care  Palliative Care following  hospice planning
d/w Stroke resident  will follow w/ you
d/w Medicine resident  poor prognosis  DNR/DNI  comfort care; no MEWS or escalation of care  Palliative Care following  d/c planning to NH w/ hospice
d/w Medicine resident  will follow w/ you  poor prognosis  GOC discussions ongoing

## 2022-02-02 NOTE — PROGRESS NOTE ADULT - PROBLEM SELECTOR PLAN 1
MRI shows "evolving acute infarct in the left distal MCA vascular territory distribution, no hemorrhage;" cont. work-up and mgmt per Neuro, PT/OT; on ASA + statin; Palliative Care following, GOC discussions ongoing
MRI shows "evolving acute infarct in the left distal MCA vascular territory distribution, no hemorrhage;" Palliative Care and Neurology following; Pt. now comfort care, per [facility] discussion 1/31
MRI shows "evolving acute infarct in the left distal MCA vascular territory distribution, no hemorrhage;" cont. work-up and mgmt per Neuro, PT/OT, NPO pending SLP evaluation; suggest Palliative Care consult
- MRI Brain without contrast early subacute ischemia left parietal lobe with few punctate foci of subacute ischemia high posterior left frontal lobe.  - Stroke Code HCT Results: Area of hypodensity is noted in the left high frontoparietal lobe which is suspicious for acute/subacute   infarction.  - Stroke Code CTA Results: Bilateral pleural effusions, Food debris/secretions in the dependent esophagus compatible with gastroesophageal reflux, Multinodular thyromegaly.  PLAN:  - ASA 81mg via NG tube  - Plavix 75mg via NG tube  - atorvastatin 80mg daily  - Lovenox 30mg sq for DVT prophylaxis  - obtain collateral from family/rehab  - f/u palliative recs
MRI shows "evolving acute infarct in the left distal MCA vascular territory distribution, no hemorrhage;" Palliative Care and Neurology following; Pt. now comfort care, per [facility] discussion 1/31
- MRI Brain without contrast early subacute ischemia left parietal lobe with few punctate foci of subacute ischemia high posterior left frontal lobe.  - Stroke Code HCT Results: Area of hypodensity is noted in the left high frontoparietal lobe which is suspicious for acute/subacute   infarction.  - Stroke Code CTA Results: Bilateral pleural effusions, Food debris/secretions in the dependent esophagus compatible with gastroesophageal reflux, Multinodular thyromegaly.  PLAN:  - ASA 81mg via NG tube  - Plavix 75mg via NG tube  - atorvastatin 80mg daily  - Lovenox 30mg sq for DVT prophylaxis  - obtain collateral from family/rehab  - f/u palliative recs
MRI shows "evolving acute infarct in the left distal MCA vascular territory distribution, no hemorrhage;" cont. work-up and mgmt per Neuro, PT/OT, NPO pending SLP evaluation;   -c/w ASA 81mg, Plavix 75mg via NGT  -c/w atorvastatin 80mg qd via NGT  - f/u Palliative recs  -BP goals with normtension

## 2022-02-02 NOTE — PROGRESS NOTE ADULT - PROBLEM SELECTOR PLAN 9
TSH WNL, no need for further inpatient work-up

## 2022-02-02 NOTE — PROGRESS NOTE ADULT - PROBLEM SELECTOR PLAN 8
Replete K<4 and Mg <2  Diet: NG tube feeds  DVT prophylaxis: Lovenox 40mg sq and SCDs  GI prophylaxis: none    Dispo: return to [place], no additional needs per PT/OT  Code: full. Discussed daily hospital plans and goals with patient and family at bedside. (Called and updated family.)
HbA1c 5.7%; will no longer monitor blood glucose as comfort measure, per Pt.'s GOC
HbA1c 5.7%; monitor blood glucose
HbA1c 5.7%; will no longer monitor blood glucose as comfort measure, per Pt.'s GOC
Replete K<4 and Mg <2  Diet: NG tube feeds  DVT prophylaxis: Lovenox 40mg sq and SCDs  GI prophylaxis: none    Dispo: return to [place], no additional needs per PT/OT  Code: full. Discussed daily hospital plans and goals with patient and family at bedside. (Called and updated family.)
HbA1c 5.7%; monitor blood glucose
Replete K<4 and Mg <2  Diet: NG tube feeds  DVT prophylaxis: Lovenox 40mg sq and SCDs  GI prophylaxis: none    Dispo: return to [place], no additional needs per PT/OT  Code: full. Discussed daily hospital plans and goals with patient and family at bedside. (Called and updated family.)
HbA1c 5.7%; monitor blood glucose
Replete K<4 and Mg <2  Diet: NG tube feeds  DVT prophylaxis: Lovenox 40mg sq and SCDs  GI prophylaxis: none    Dispo: return to [place], no additional needs per PT/OT  Code: full. Discussed daily hospital plans and goals with patient and family at bedside. (Called and updated family.)

## 2022-02-02 NOTE — PROGRESS NOTE ADULT - PROBLEM SELECTOR PROBLEM 6
Hypertrophic obstructive cardiomyopathy (HOCM)
Hypertrophic obstructive cardiomyopathy (HOCM)
Hyperlipemia
Hyperlipemia
Hypertrophic obstructive cardiomyopathy (HOCM)
Hyperlipemia
Hyperlipemia
Hypertrophic obstructive cardiomyopathy (HOCM)
Hypertrophic obstructive cardiomyopathy (HOCM)

## 2022-02-02 NOTE — PROGRESS NOTE ADULT - PROBLEM SELECTOR PLAN 2
- Covid-19 negative on 1/26  - Stroke Code CXR results:  Left lower lobe consolidation. Cardiomegaly. Recent COVID infection, obtain collateral from rehab, may have received course of abx for PNA  PLAN:   - Monitor with respiratory exams, CBC and CxR.  - Consider chest CT for further evaluation.
assist w/ ADLs; Palliative Care following
- Covid-19 negative on 1/26  - Stroke Code CXR results:  Left lower lobe consolidation. Cardiomegaly. Recent COVID infection, obtain collateral from rehab, may have received course of abx for PNA  PLAN:   - Monitor with respiratory exams, CBC and CxR.  - Consider chest CT for further evaluation.
assist w/ ADLs; suggest Palliative Care consult
assist w/ ADLs; Palliative Care following
assist w/ ADLs; Palliative Care following
- Covid-19 negative on 1/26  - Stroke Code CXR results:  Left lower lobe consolidation. Cardiomegaly. Recent COVID infection, obtain collateral from rehab, may have received course of abx for PNA  PLAN:   - Monitor with respiratory exams, CBC and CxR.  - Consider chest CT for further evaluation.
- Covid-19 negative on 1/26  - Stroke Code CXR results:  Left lower lobe consolidation. Cardiomegaly. Recent COVID infection, obtain collateral from rehab, may have received course of abx for PNA  PLAN:   - Monitor with respiratory exams, CBC and CxR.  - Consider chest CT for further evaluation.
assist w/ ADLs;   -f/u Palliative recs

## 2022-02-02 NOTE — PROGRESS NOTE ADULT - PROBLEM SELECTOR PLAN 4
- second degree type 1 heart block on tele with episodes of sinus bradycardia in the 50s.   - Cards consulted, no signed off, no intervention at this time  PLAN:  - hold metoprolol for now due to bradycardia
per TTE 1/27, EF has improved; appreciate Cardiology recs; monitor volume status; holding beta-blocker, as above
per TTE 1/27, EF has improved; appreciate Cardiology recs; monitor volume status; holding beta-blocker, as above
per TTE 1/27, EF has improved; appreciate Cardiology recs; monitor volume status; holding beta-blocker for bradycardia
per TTE 1/27, EF has improved; appreciate Cardiology recs; monitor volume status; have been holding beta-blocker due to bradycardia
- second degree type 1 heart block on tele with episodes of sinus bradycardia in the 50s.   - Cards consulted, no signed off, no intervention at this time  PLAN:  - hold metoprolol for now due to bradycardia
- second degree type 1 heart block on tele with episodes of sinus bradycardia in the 50s.   - Cards consulted, no signed off, no intervention at this time  PLAN:  - hold metoprolol for now due to bradycardia
per TTE 1/27, EF has improved; appreciate Cardiology recs; monitor volume status; have been holding beta-blocker due to bradycardia
- second degree type 1 heart block on tele with episodes of sinus bradycardia in the 50s.   - Cards consulted, no signed off, no intervention at this time  PLAN:  - hold metoprolol for now due to bradycardia

## 2022-02-02 NOTE — PROGRESS NOTE ADULT - PROBLEM SELECTOR PLAN 3
- TTE 1/27 ef 70%, no evidence of R to L shunt  PLAN:  - hold lisinopril & metoprolol for now due to bradycardia
hold beta-blocker, monitor on telemetry, serial EKGs, appreciate Cardiology recs.
- TTE 1/27 ef 70%, no evidence of R to L shunt  PLAN:  - hold lisinopril & metoprolol for now due to bradycardia
likely due to CVA and advanced dementia; cont. Jevity via NGT for now, aspiration precautions; SLP and Palliative Care following, GOC discussions ongoing
- TTE 1/27 ef 70%, no evidence of R to L shunt  PLAN:  - hold lisinopril & metoprolol for now due to bradycardia
likely due to CVA and advanced dementia; Pt. now comfort care, as above; PEG not in line w/ GOC
hold beta-blocker, monitor on telemetry, serial EKGs, appreciate Cardiology recs.
- TTE 1/27 ef 70%, no evidence of R to L shunt  PLAN:  - hold lisinopril & metoprolol for now due to bradycardia
likely due to CVA and advanced dementia; cont. comfort feeds w/ aspiration precautions, per GOC; PEG not in line w/ GOC
[id number]

## 2022-02-02 NOTE — PROGRESS NOTE ADULT - PROBLEM SELECTOR PROBLEM 8
Prediabetes
Nutrition, metabolism, and development symptoms
Prediabetes
Prediabetes

## 2022-02-02 NOTE — PROGRESS NOTE ADULT - SUBJECTIVE AND OBJECTIVE BOX
Patient is a 81y old  Female who presents with a chief complaint of AMS, stroke on CT/MRI (02 Feb 2022 11:25)      INTERVAL HPI/OVERNIGHT EVENTS:    Pt. seen and examined earlier today  Pt. non-verbal; no complaints elicited; ROS limited due to advanced dementia    Review of Systems: 12 point review of systems otherwise negative    MEDICATIONS  (STANDING):  aspirin  chewable 81 milliGRAM(s) Oral every 24 hours  atorvastatin 80 milliGRAM(s) Oral at bedtime  cholecalciferol 2000 Unit(s) Oral daily    MEDICATIONS  (PRN):      Allergies    No Known Allergies    Intolerances          Vital Signs Last 24 Hrs  T(C): 36.7 (02 Feb 2022 13:07), Max: 36.7 (02 Feb 2022 13:07)  T(F): 98.1 (02 Feb 2022 13:07), Max: 98.1 (02 Feb 2022 13:07)  HR: 52 (02 Feb 2022 13:07) (52 - 62)  BP: 119/63 (02 Feb 2022 13:07) (119/63 - 135/81)  BP(mean): --  RR: 16 (02 Feb 2022 13:07) (16 - 16)  SpO2: 97% (02 Feb 2022 13:07) (95% - 97%)  CAPILLARY BLOOD GLUCOSE      POCT Blood Glucose.: 76 mg/dL (02 Feb 2022 12:42)  POCT Blood Glucose.: 80 mg/dL (02 Feb 2022 06:11)  POCT Blood Glucose.: 91 mg/dL (01 Feb 2022 17:07)        Physical Exam:  (earlier today)  Daily     Daily   General:  ill but comfortable-appearing in NAD  HEENT:  MMM  CV:  RRR, no JVD  Lungs:  CTA B/L anteriorly; normal WOB on RA  Abdomen:  soft NT ND  Extremities:  no edema B/L LE  Skin:  WWP  :  +PrimaFit  Neuro:  AAOx0, doesn't follow commands    LABS:

## 2022-02-08 DIAGNOSIS — R47.01 APHASIA: ICD-10-CM

## 2022-02-08 DIAGNOSIS — R62.7 ADULT FAILURE TO THRIVE: ICD-10-CM

## 2022-02-08 DIAGNOSIS — F03.90 UNSPECIFIED DEMENTIA WITHOUT BEHAVIORAL DISTURBANCE: ICD-10-CM

## 2022-02-08 DIAGNOSIS — I25.2 OLD MYOCARDIAL INFARCTION: ICD-10-CM

## 2022-02-08 DIAGNOSIS — R13.12 DYSPHAGIA, OROPHARYNGEAL PHASE: ICD-10-CM

## 2022-02-08 DIAGNOSIS — I11.0 HYPERTENSIVE HEART DISEASE WITH HEART FAILURE: ICD-10-CM

## 2022-02-08 DIAGNOSIS — I42.1 OBSTRUCTIVE HYPERTROPHIC CARDIOMYOPATHY: ICD-10-CM

## 2022-02-08 DIAGNOSIS — Z79.82 LONG TERM (CURRENT) USE OF ASPIRIN: ICD-10-CM

## 2022-02-08 DIAGNOSIS — R53.2 FUNCTIONAL QUADRIPLEGIA: ICD-10-CM

## 2022-02-08 DIAGNOSIS — Z51.5 ENCOUNTER FOR PALLIATIVE CARE: ICD-10-CM

## 2022-02-08 DIAGNOSIS — R73.03 PREDIABETES: ICD-10-CM

## 2022-02-08 DIAGNOSIS — J12.82 PNEUMONIA DUE TO CORONAVIRUS DISEASE 2019: ICD-10-CM

## 2022-02-08 DIAGNOSIS — K21.9 GASTRO-ESOPHAGEAL REFLUX DISEASE WITHOUT ESOPHAGITIS: ICD-10-CM

## 2022-02-08 DIAGNOSIS — R29.724 NIHSS SCORE 24: ICD-10-CM

## 2022-02-08 DIAGNOSIS — I25.10 ATHEROSCLEROTIC HEART DISEASE OF NATIVE CORONARY ARTERY WITHOUT ANGINA PECTORIS: ICD-10-CM

## 2022-02-08 DIAGNOSIS — R00.1 BRADYCARDIA, UNSPECIFIED: ICD-10-CM

## 2022-02-08 DIAGNOSIS — I50.22 CHRONIC SYSTOLIC (CONGESTIVE) HEART FAILURE: ICD-10-CM

## 2022-02-08 DIAGNOSIS — Z66 DO NOT RESUSCITATE: ICD-10-CM

## 2022-02-08 DIAGNOSIS — I34.0 NONRHEUMATIC MITRAL (VALVE) INSUFFICIENCY: ICD-10-CM

## 2022-02-08 DIAGNOSIS — I63.89 OTHER CEREBRAL INFARCTION: ICD-10-CM

## 2022-02-08 DIAGNOSIS — E87.6 HYPOKALEMIA: ICD-10-CM

## 2022-02-08 DIAGNOSIS — Z78.1 PHYSICAL RESTRAINT STATUS: ICD-10-CM

## 2022-02-08 DIAGNOSIS — U09.9 POST COVID-19 CONDITION, UNSPECIFIED: ICD-10-CM

## 2022-02-08 DIAGNOSIS — G81.91 HEMIPLEGIA, UNSPECIFIED AFFECTING RIGHT DOMINANT SIDE: ICD-10-CM

## 2022-02-08 DIAGNOSIS — I63.9 CEREBRAL INFARCTION, UNSPECIFIED: ICD-10-CM

## 2022-02-08 DIAGNOSIS — E04.2 NONTOXIC MULTINODULAR GOITER: ICD-10-CM

## 2022-02-08 DIAGNOSIS — E78.5 HYPERLIPIDEMIA, UNSPECIFIED: ICD-10-CM

## 2022-03-17 NOTE — CONSULT NOTE ADULT - SUBJECTIVE AND OBJECTIVE BOX
Patient is a 81y old  Female who presents with a chief complaint of AMS, stroke on CT/MRI (2022 00:39)       HPI:   **STROKE HPI***  (All info obtained from chart, unable to contact family or doctor for collateral)    HPI: 81y Female with PMHx of CHF (reduced EF), HTN, hx of STEMI in the past, dementia, COVID in 2020 and recently with COVID 13 days ago (unclear vaccination status) who presented to the ED brought in from [facility] due to altered mental status and hypokalemia. CT/MRI done which showed subacute left frontoparietal infarct, therefore, admitted to stroke team for further workup.   Unable to obtain ROS from patient, as patient A&Ox3, not following commands and unintelligible speech. Attempted to reach family, however, no response.   As per info obtained from the ED, the patient is normally A&O x3, however, has been increasingly lethargic and confused, which was initially attributed to COVID.     T(C): 36.1 (22 @ 00:48), Max: 36.9 (22 @ 18:41)  HR: 52 (22 @ 04:00) (52 - 64)  BP: 134/60 (22 @ 04:00) (134/60 - 189/79)  RR: 13 (22 @ 04:00) (13 - 18)  SpO2: 94% (22 @ 04:00) (92% - 99%)    Medications taking at Rehab facility:   aspirin 81mg daily  atorvastatin 20mg daily   cholecalfierol 1000 unit 2 tab daily   dronabinal 2.5 daily for anorexia   Lovenox 40 units subq daily for DVT prophylaxis   lisinipril 2.5 mg daily for HF  metoprolol tartate 12.5 mg two times a day for HF    Vital Signs Last 24 Hrs  T(C): 36.1 (2022 00:48), Max: 36.9 (2022 18:41)  T(F): 97 (2022 00:48), Max: 98.5 (2022 18:41)  HR: 52 (2022 04:00) (52 - 64)  BP: 134/60 (2022 04:00) (134/60 - 189/79)  BP(mean): 87 (2022 04:00) (87 - 106)  RR: 13 (2022 04:00) (13 - 18)  SpO2: 94% (2022 04:00) (92% - 99%)                 (2022 00:39)      PAST MEDICAL & SURGICAL HISTORY:  Dementia        MEDICATIONS  (STANDING):  aspirin Suppository 300 milliGRAM(s) Rectal daily  atorvastatin 80 milliGRAM(s) Oral at bedtime  cholecalciferol 2000 Unit(s) Oral daily  enoxaparin Injectable 40 milliGRAM(s) SubCutaneous every 24 hours  magnesium sulfate  IVPB 2 Gram(s) IV Intermittent once  potassium chloride  20 mEq/100 mL IVPB 20 milliEquivalent(s) IV Intermittent every 2 hours    MEDICATIONS  (PRN):      FAMILY HISTORY:      CBC Full  -  ( 2022 06:54 )  WBC Count : 11.87 K/uL  RBC Count : 4.43 M/uL  Hemoglobin : 12.7 g/dL  Hematocrit : 39.7 %  Platelet Count - Automated : 292 K/uL  Mean Cell Volume : 89.6 fl  Mean Cell Hemoglobin : 28.7 pg  Mean Cell Hemoglobin Concentration : 32.0 gm/dL  Auto Neutrophil # : x  Auto Lymphocyte # : x  Auto Monocyte # : x  Auto Eosinophil # : x  Auto Basophil # : x  Auto Neutrophil % : x  Auto Lymphocyte % : x  Auto Monocyte % : x  Auto Eosinophil % : x  Auto Basophil % : x          142  |  102  |  13  ----------------------------<  111<H>  3.0<L>   |  32<H>  |  0.59    Ca    9.0      2022 06:53  Phos  2.2       Mg     1.8         TPro  6.2  /  Alb  2.9<L>  /  TBili  0.4  /  DBili  x   /  AST  47<H>  /  ALT  13  /  AlkPhos  65        Urinalysis Basic - ( 2022 18:21 )    Color: Yellow / Appearance: SL Cloudy / S.025 / pH: x  Gluc: x / Ketone: NEGATIVE  / Bili: Negative / Urobili: 0.2 E.U./dL   Blood: x / Protein: NEGATIVE mg/dL / Nitrite: NEGATIVE   Leuk Esterase: NEGATIVE / RBC: x / WBC x   Sq Epi: x / Non Sq Epi: x / Bacteria: x          Radiology:    < from: CT Head No Cont (22 @ 20:13) >  ACC: [id number] EXAM:  CT BRAIN                          PROCEDURE DATE:  2022          INTERPRETATION:  EXAM: CT HEAD WITHOUT INTRAVENOUS CONTRAST    CLINICAL INFORMATION:ams    TECHNIQUE: Noncontrast axial CT images were acquired through the head.   Two-dimensional sagittal and coronal reformats were generated. Study is   degraded by streak artifacts.    COMPARISON STUDY: Head CT dated 3/18/2020.    FINDINGS:  There is no CT evidence of acute intracranial hemorrhage, extra-axial   collection, vasogenic edema, mass effect, midline shift, central   herniation, or hydrocephalus.    There is age-appropriate cerebral volume loss. There is patchy white   matter hypoattenuation which is nonspecific in etiology but likely   related to chronicmicrovascular ischemic disease. Area of hypodensity is   noted in the left high frontoparietal lobe with regional sulcal   effacement.    There is partial opacification of the visualized right maxillary sinus.    The mastoid air cells and middle ear cavities are grossly clear.    The calvarium, skull base, visualized facial bones and regional soft   tissues are grossly unremarkable.    IMPRESSION:  Study degraded by streak artifacts. Area of hypodensity is noted in the   left high frontoparietal lobe which is suspicious for acute/subacute   infarction.    No acute intracranial hemorrhage is seen.      < from: CT Angio Neck w/ IV Cont (22 @ 23:59) >  ACC: [id number] EXAM:  CT ANGIO NECK (W)AW IC                        ACC: [id number] EXAM:  CT ANGIO BRAIN (W)AW IC                          PROCEDURE DATE:  2022    ******PRELIMINARY REPORT******      ******PRELIMINARY REPORT******           INTERPRETATION:  PROCEDURE INFORMATION:  Exam: CT Angiography Head With Contrast, Arteriography  Exam date and time: 2022 11:35 PM  Age: 81 years old  Clinical indication: Condition or disease; Infarction; Patient HX: Stroke  TECHNIQUE:  Imaging protocol: Computed tomography angiography of the head with   contrast. Exam focused on  the arteries.  3D rendering (Not supervised by radiologist): MIP and/or 3D reconstructed   images were created  by the technologist.  COMPARISON:  MR BRAIN 2022 10:54 PM    FINDINGS:  ANTERIOR CIRCULATION:  Right internal carotid artery: Unremarkable. Intracranial segment is   patent with no significant  stenosis. No aneurysm.  Right middle cerebral artery: Unremarkable. No occlusion or significant   stenosis. No aneurysm.  Right anterior cerebral artery: Unremarkable. No occlusion or significant   stenosis. No aneurysm.  Left internal carotid artery: Unremarkable. Intracranial segment is   patent with no significant  stenosis. No aneurysm.  Left middle cerebral artery: Unremarkable. No occlusion or significant   stenosis. No aneurysm.  Left anterior cerebral artery: Unremarkable. No occlusion or significant   stenosis. No aneurysm.  POSTERIOR CIRCULATION:  Right vertebral artery: Unremarkable. No occlusion or significant   stenosis. No aneurysm.  Left vertebral artery: Unremarkable. No occlusion or significant   stenosis. No aneurysm.  Basilar artery: Unremarkable. No occlusion or significant stenosis. No   aneurysm.  Right posterior cerebral artery: Unremarkable. No occlusion or   significant stenosis. No aneurysm.  Left posterior cerebral artery: Unremarkable. No occlusion or significant   stenosis. No aneurysm.  Brain: No definite mass, mass effect, or midline shift.  Cerebral ventricles: No ventriculomegaly.  Bones/joints: Unremarkable. No acute fracture.  Soft tissues: Unremarkable.  Paranasal sinuses: Partial opacification of the right maxillary sinus   compatible with sinusitis.    IMPRESSION:  1. Partial opacification of the right maxillary sinus compatible with   sinusitis.  2. No acute vascular findings.  =========================    PROCEDURE INFORMATION:  Exam: CT Angiography Neck With Contrast  Exam date and time: 2022 11:35 PM  Age: 81 years old  Clinical indication: Condition or disease; Infarction; Patient HX: Stroke  TECHNIQUE:  Imaging protocol: Computed tomography angiography of the neck with   contrast.  3D rendering (Not supervised by radiologist): MIP and/or 3D reconstructed   images were created  by the technologist.  COMPARISON:  MR BRAIN 2022 10:54 PM    FINDINGS:  Right common carotid artery: No stenosis. No dissection or occlusion.  Right internal carotid artery: No stenosis of the extracranial segment.   No dissection or occlusion.  Right external carotid artery: No occlusion or stenosis of the origin.  Left common carotid artery: No stenosis. No dissection or occlusion.  Left internal carotid artery: No stenosis of the extracranial segment. No   dissection or occlusion.  Left external carotid artery: No occlusion or stenosis of the origin.  Right vertebral artery: No stenosis. No dissection or occlusion.  Left vertebral artery: No stenosis. No dissection or occlusion.  Thyroid: Multinodular thyromegaly.  Soft tissues: Normal. No significant soft tissue swelling.  Bones/joints: No acute fracture.  Pleural spaces: Bilateral pleural effusions.  Esophagus: Food debris/secretions in the dependent esophagus compatible   with gastroesophageal  reflux.    IMPRESSION:  1. Bilateral pleural effusions.  2. Food debris/secretions in the dependent esophagus compatible with   gastroesophageal reflux.  3. Multinodular thyromegaly.  4. No acute vascular findings.                Vital Signs Last 24 Hrs  T(C): 36.2 (2022 09:07), Max: 36.9 (2022 18:41)  T(F): 97.1 (2022 09:07), Max: 98.5 (2022 18:41)  HR: 53 (2022 08:51) (52 - 64)  BP: 168/76 (2022 08:51) (134/60 - 189/79)  BP(mean): 109 (2022 08:51) (87 - 109)  RR: 17 (2022 08:51) (13 - 18)  SpO2: 95% (2022 08:51) (92% - 99%)        REVIEW OF SYSTEMS:    CONSTITUTIONAL: No fever, weight loss, or fatigue  EYES: No eye pain, visual disturbances, or discharge  ENMT:  No difficulty hearing, tinnitus, vertigo; No sinus or throat pain  NECK: No pain or stiffness  BREASTS: No pain, masses, or nipple discharge  RESPIRATORY: No cough, wheezing, chills or hemoptysis; No shortness of breath  CARDIOVASCULAR: No chest pain, palpitations, dizziness, or leg swelling  GASTROINTESTINAL: No abdominal or epigastric pain. No nausea, vomiting, or hematemesis; No diarrhea or constipation. No melena or hematochezia.  GENITOURINARY: No dysuria, frequency, hematuria, or incontinence  NEUROLOGICAL:  per HPI  SKIN: No itching, burning, rashes, or lesions   LYMPH NODES: No enlarged glands  ENDOCRINE: No heat or cold intolerance; No hair loss  MUSCULOSKELETAL: No joint pain or swelling; No muscle, back, or extremity pain  PSYCHIATRIC: No depression, anxiety, mood swings, or difficulty sleeping  HEME/LYMPH: No easy bruising, or bleeding gums  ALLERGY AND IMMUNOLOGIC: No hives or eczema  VASCULAR: no swelling, erythema,           Physical Exam:  frail 82 yo  woman lying n semi Campos's position, somnolent, opens eyes with tactile stimulation, non verbal    Head: normocephalic, atraumatic    Eyes: PERRLA, EOMI, no nystagmus, sclera anicteric    ENT: nasal discharge, uvula midline, no oropharyngeal erythema/exudate    Neck: supple, negative JVD, negative carotid bruits, no thyromegaly    Chest: CTA bilaterally, neg wheeze/ rhonchi/ rales/ crackles/ egophany    Cardiovascular: regular rate and rhythm, neg murmurs/rubs/gallops    Abdomen: soft, non distended, non tender to palpation in all 4 quadrants, negative rebound/guarding, normal bowel sounds    Extremities: WWP, neg cyanosis/clubbing/edema, negative calf tenderness to palpation, negative Heather's sign    Neurologic Exam:    somnolent, non verbal, does not follow simple commands    Cranial Nerves:     II:                         pupils equal, round and reactive to light,  ? Right field cut- unable to assess further secondary to mental status  III/ IV/VI:               Left gaze preference  V:                        winces to pinch  VII:                       ? R NLF flatening  VIII:                      unable to assess secondary to mental status  IX and X:              unable to assess secondary to mental status  XI:                        unable to assess secondary to mental status  XII:                       unable to assess secondary to mental status    Motor Exam:    Right UE:             2/5    Left UE:               > 3+/5      Right LE:             2/5    Left LE:              > 3+/5               Sensation:           withdraws to noxious stimuli x 4 extremities                                                DTR:                  biceps/brachioradialis: equal                                                      patella/ankle: equal                                                       neg clonus                           mute Babinski                        Coordination:     did not follow command                               Gait:  not tested              PM&R Impression:    1) s/p Left fronto-parietal infarct  2) Right hemiplegia, aphasia    Recommendations/ Plan :    1) Physical therapy focusing on therapeutic exercises, bed mobility/transfer out of bed evaluation, progressive ambulation with assistive devices prn.    2) Anticipated Disposition Plan/Recs:   came from [facility],    pending functional progress                   normal (ped)...

## 2022-04-19 ENCOUNTER — APPOINTMENT (OUTPATIENT)
Dept: NEUROLOGY | Facility: CLINIC | Age: 82
End: 2022-04-19

## 2022-05-15 NOTE — PATIENT PROFILE ADULT - FUNCTIONAL SCREEN CURRENT LEVEL: BATHING, MLM
2677: Pt transferred from [facility] to 11     0500: PIV and labs sent    0730: Bedside and Verbal shift change report given to TYRA Mackey (oncoming nurse) by Romana Noble, RN (offgoing nurse). Report included the following information SBAR, Kardex, Procedure Summary, Intake/Output, MAR, Accordion and Recent Results. 2 = assistive person/UTD; pt barely speaking/responsive

## 2022-09-01 NOTE — H&P ADULT - PROBLEM/PLAN-3
DISPLAY PLAN FREE TEXT High Dose Vitamin A Pregnancy And Lactation Text: High dose vitamin A therapy is contraindicated during pregnancy and breast feeding.

## 2023-09-19 NOTE — PROGRESS NOTE ADULT - PROBLEM SELECTOR PROBLEM 4
Hypernatremia

## 2024-08-31 NOTE — ED PROVIDER NOTE - MDM ORDERS SUBMITTED SELECTION
Patient Instructions - Doxycycline      - Avoid taking multivitamin or calcium products for 2 hours before and 2 hours after taking this medication to ensure the antibiotic is as effective as possible.                - Recommend avoiding direct sunlight while taking this medication your skin may be more sensitive to the sun     - If you do go outside, please try to wear sun protective clothing, sunscreen, and/or a hat.     - Take all of the antibiotic as instructed, even if you start to feel better      - Try to avoid taking on an empty stomach as this medication may cause an upset stomach or diarrhea     - Each dose should be taken with full glass of water and remain upright for at least 30 minutes after taking           You can apply the triamcinolone steroid cream topically to the affected area to help with itching/swelling/redness as well. Use 2-3 times per day.    Monitor for signs of worsening infection such as increasing swelling, increasing redness, red streaks extending from wound, development of fever, pus draining from wound.  If these develop then be reevaluated right away.    Should you develop signs/symptoms of Lyme's disease including generalized fatigue, body aches, headache or target rashes, please follow-up with your primary care provider or return to the urgent care for further evaluation and management.      
Labs/EKG/Imaging Studies/Medications

## 2025-03-05 NOTE — OCCUPATIONAL THERAPY INITIAL EVALUATION ADULT - LEVEL OF INDEPENDENCE: SIT/SUPINE, REHAB EVAL
dependent (less than 25% patients effort)
Hoenig, David Mayer  Urology  [address] [place] [address]- [place] [address]  Phone: [phone]  Fax: [phone]  Follow Up Time: 2 weeks  
none